# Patient Record
Sex: FEMALE | Race: WHITE | NOT HISPANIC OR LATINO | Employment: OTHER | URBAN - METROPOLITAN AREA
[De-identification: names, ages, dates, MRNs, and addresses within clinical notes are randomized per-mention and may not be internally consistent; named-entity substitution may affect disease eponyms.]

---

## 2017-01-30 ENCOUNTER — COMMUNICATION - HEALTHEAST (OUTPATIENT)
Dept: ADMINISTRATIVE | Facility: CLINIC | Age: 52
End: 2017-01-30

## 2017-01-30 ENCOUNTER — COMMUNICATION - HEALTHEAST (OUTPATIENT)
Dept: RHEUMATOLOGY | Facility: CLINIC | Age: 52
End: 2017-01-30

## 2017-01-30 DIAGNOSIS — M05.79 SEROPOSITIVE RHEUMATOID ARTHRITIS OF MULTIPLE SITES (H): ICD-10-CM

## 2017-02-06 ENCOUNTER — COMMUNICATION - HEALTHEAST (OUTPATIENT)
Dept: LAB | Facility: CLINIC | Age: 52
End: 2017-02-06

## 2017-02-06 DIAGNOSIS — M05.79 SEROPOSITIVE RHEUMATOID ARTHRITIS OF MULTIPLE SITES (H): ICD-10-CM

## 2017-02-07 ENCOUNTER — COMMUNICATION - HEALTHEAST (OUTPATIENT)
Dept: INTERNAL MEDICINE | Facility: CLINIC | Age: 52
End: 2017-02-07

## 2017-02-07 DIAGNOSIS — E03.9 HYPOTHYROIDISM: ICD-10-CM

## 2017-02-21 ENCOUNTER — AMBULATORY - HEALTHEAST (OUTPATIENT)
Dept: LAB | Facility: CLINIC | Age: 52
End: 2017-02-21

## 2017-02-21 DIAGNOSIS — M05.79 SEROPOSITIVE RHEUMATOID ARTHRITIS OF MULTIPLE SITES (H): ICD-10-CM

## 2017-02-21 LAB
ALT SERPL W P-5'-P-CCNC: 16 U/L (ref 0–45)
CREAT SERPL-MCNC: 0.92 MG/DL (ref 0.6–1.1)
GFR SERPL CREATININE-BSD FRML MDRD: >60 ML/MIN/1.73M2

## 2017-03-01 ENCOUNTER — COMMUNICATION - HEALTHEAST (OUTPATIENT)
Dept: INTERNAL MEDICINE | Facility: CLINIC | Age: 52
End: 2017-03-01

## 2017-03-01 DIAGNOSIS — I10 UNSPECIFIED ESSENTIAL HYPERTENSION: ICD-10-CM

## 2017-03-11 ENCOUNTER — COMMUNICATION - HEALTHEAST (OUTPATIENT)
Dept: INTERNAL MEDICINE | Facility: CLINIC | Age: 52
End: 2017-03-11

## 2017-03-11 DIAGNOSIS — I10 UNSPECIFIED ESSENTIAL HYPERTENSION: ICD-10-CM

## 2017-03-21 ENCOUNTER — OFFICE VISIT - HEALTHEAST (OUTPATIENT)
Dept: INTERNAL MEDICINE | Facility: CLINIC | Age: 52
End: 2017-03-21

## 2017-03-21 DIAGNOSIS — E55.9 VITAMIN D DEFICIENCY: ICD-10-CM

## 2017-03-21 DIAGNOSIS — I10 ESSENTIAL HYPERTENSION: ICD-10-CM

## 2017-03-21 DIAGNOSIS — M05.79 SEROPOSITIVE RHEUMATOID ARTHRITIS OF MULTIPLE SITES (H): ICD-10-CM

## 2017-03-21 DIAGNOSIS — Z00.00 HEALTH CARE MAINTENANCE: ICD-10-CM

## 2017-03-21 DIAGNOSIS — E78.5 DYSLIPIDEMIA: ICD-10-CM

## 2017-03-21 DIAGNOSIS — E03.9 HYPOTHYROIDISM: ICD-10-CM

## 2017-03-21 DIAGNOSIS — Z82.62 FAMILY HISTORY OF OSTEOPOROSIS: ICD-10-CM

## 2017-03-21 LAB — LDLC SERPL CALC-MCNC: 161 MG/DL

## 2017-03-21 ASSESSMENT — MIFFLIN-ST. JEOR: SCORE: 1615.09

## 2017-03-22 ENCOUNTER — COMMUNICATION - HEALTHEAST (OUTPATIENT)
Dept: INTERNAL MEDICINE | Facility: CLINIC | Age: 52
End: 2017-03-22

## 2017-03-22 DIAGNOSIS — E78.5 DYSLIPIDEMIA: ICD-10-CM

## 2017-03-22 DIAGNOSIS — E03.9 HYPOTHYROID: ICD-10-CM

## 2017-03-28 ENCOUNTER — RECORDS - HEALTHEAST (OUTPATIENT)
Dept: ADMINISTRATIVE | Facility: OTHER | Age: 52
End: 2017-03-28

## 2017-03-28 ENCOUNTER — RECORDS - HEALTHEAST (OUTPATIENT)
Dept: BONE DENSITY | Facility: CLINIC | Age: 52
End: 2017-03-28

## 2017-03-28 DIAGNOSIS — Z82.62 FAMILY HISTORY OF OSTEOPOROSIS: ICD-10-CM

## 2017-03-28 DIAGNOSIS — M05.79 RHEUMATOID ARTHRITIS WITH RHEUMATOID FACTOR OF MULTIPLE SITES WITHOUT ORGAN OR SYSTEMS INVOLVEMENT (H): ICD-10-CM

## 2017-04-03 ENCOUNTER — COMMUNICATION - HEALTHEAST (OUTPATIENT)
Dept: RHEUMATOLOGY | Facility: CLINIC | Age: 52
End: 2017-04-03

## 2017-04-03 DIAGNOSIS — M05.79 SEROPOSITIVE RHEUMATOID ARTHRITIS OF MULTIPLE SITES (H): ICD-10-CM

## 2017-04-07 ENCOUNTER — RECORDS - HEALTHEAST (OUTPATIENT)
Dept: ADMINISTRATIVE | Facility: OTHER | Age: 52
End: 2017-04-07

## 2017-04-07 ENCOUNTER — COMMUNICATION - HEALTHEAST (OUTPATIENT)
Dept: RHEUMATOLOGY | Facility: CLINIC | Age: 52
End: 2017-04-07

## 2017-04-07 DIAGNOSIS — M05.79 SEROPOSITIVE RHEUMATOID ARTHRITIS OF MULTIPLE SITES (H): ICD-10-CM

## 2017-04-11 ENCOUNTER — COMMUNICATION - HEALTHEAST (OUTPATIENT)
Dept: RHEUMATOLOGY | Facility: CLINIC | Age: 52
End: 2017-04-11

## 2017-04-11 DIAGNOSIS — M05.79 SEROPOSITIVE RHEUMATOID ARTHRITIS OF MULTIPLE SITES (H): ICD-10-CM

## 2017-04-18 ENCOUNTER — AMBULATORY - HEALTHEAST (OUTPATIENT)
Dept: LAB | Facility: CLINIC | Age: 52
End: 2017-04-18

## 2017-04-18 DIAGNOSIS — E78.5 DYSLIPIDEMIA: ICD-10-CM

## 2017-04-18 DIAGNOSIS — M05.79 SEROPOSITIVE RHEUMATOID ARTHRITIS OF MULTIPLE SITES (H): ICD-10-CM

## 2017-04-18 DIAGNOSIS — E03.9 HYPOTHYROID: ICD-10-CM

## 2017-04-18 LAB
ALT SERPL W P-5'-P-CCNC: 20 U/L (ref 0–45)
CHOLEST SERPL-MCNC: 232 MG/DL
CREAT SERPL-MCNC: 0.99 MG/DL (ref 0.6–1.1)
FASTING STATUS PATIENT QL REPORTED: YES
GFR SERPL CREATININE-BSD FRML MDRD: 59 ML/MIN/1.73M2
HDLC SERPL-MCNC: 58 MG/DL
LDLC SERPL CALC-MCNC: 153 MG/DL
TRIGL SERPL-MCNC: 104 MG/DL

## 2017-04-19 ENCOUNTER — COMMUNICATION - HEALTHEAST (OUTPATIENT)
Dept: INTERNAL MEDICINE | Facility: CLINIC | Age: 52
End: 2017-04-19

## 2017-04-20 ENCOUNTER — OFFICE VISIT - HEALTHEAST (OUTPATIENT)
Dept: RHEUMATOLOGY | Facility: CLINIC | Age: 52
End: 2017-04-20

## 2017-04-20 DIAGNOSIS — M05.79 SEROPOSITIVE RHEUMATOID ARTHRITIS OF MULTIPLE SITES (H): ICD-10-CM

## 2017-04-20 DIAGNOSIS — M17.0 PRIMARY OSTEOARTHRITIS OF BOTH KNEES: ICD-10-CM

## 2017-04-20 DIAGNOSIS — R76.8 POSITIVE ANTI-CCP TEST: ICD-10-CM

## 2017-04-20 DIAGNOSIS — Z79.899 HIGH RISK MEDICATION USE: ICD-10-CM

## 2017-04-20 ASSESSMENT — MIFFLIN-ST. JEOR: SCORE: 1613.27

## 2017-05-15 ENCOUNTER — COMMUNICATION - HEALTHEAST (OUTPATIENT)
Dept: INTERNAL MEDICINE | Facility: CLINIC | Age: 52
End: 2017-05-15

## 2017-05-15 DIAGNOSIS — E03.9 HYPOTHYROIDISM: ICD-10-CM

## 2017-05-16 ENCOUNTER — RECORDS - HEALTHEAST (OUTPATIENT)
Dept: ADMINISTRATIVE | Facility: OTHER | Age: 52
End: 2017-05-16

## 2017-05-30 ENCOUNTER — COMMUNICATION - HEALTHEAST (OUTPATIENT)
Dept: RHEUMATOLOGY | Facility: CLINIC | Age: 52
End: 2017-05-30

## 2017-05-30 DIAGNOSIS — Z79.899 HIGH RISK MEDICATION USE: ICD-10-CM

## 2017-05-30 DIAGNOSIS — M05.79 SEROPOSITIVE RHEUMATOID ARTHRITIS OF MULTIPLE SITES (H): ICD-10-CM

## 2017-06-18 ENCOUNTER — COMMUNICATION - HEALTHEAST (OUTPATIENT)
Dept: INTERNAL MEDICINE | Facility: CLINIC | Age: 52
End: 2017-06-18

## 2017-06-18 DIAGNOSIS — I10 UNSPECIFIED ESSENTIAL HYPERTENSION: ICD-10-CM

## 2017-07-17 ENCOUNTER — COMMUNICATION - HEALTHEAST (OUTPATIENT)
Dept: RHEUMATOLOGY | Facility: CLINIC | Age: 52
End: 2017-07-17

## 2017-07-17 DIAGNOSIS — M05.79 SEROPOSITIVE RHEUMATOID ARTHRITIS OF MULTIPLE SITES (H): ICD-10-CM

## 2017-07-21 ENCOUNTER — AMBULATORY - HEALTHEAST (OUTPATIENT)
Dept: LAB | Facility: CLINIC | Age: 52
End: 2017-07-21

## 2017-07-21 DIAGNOSIS — M05.79 SEROPOSITIVE RHEUMATOID ARTHRITIS OF MULTIPLE SITES (H): ICD-10-CM

## 2017-07-21 LAB
ALT SERPL W P-5'-P-CCNC: 20 U/L (ref 0–45)
CREAT SERPL-MCNC: 1.04 MG/DL (ref 0.6–1.1)
GFR SERPL CREATININE-BSD FRML MDRD: 55 ML/MIN/1.73M2

## 2017-09-05 ENCOUNTER — COMMUNICATION - HEALTHEAST (OUTPATIENT)
Dept: RHEUMATOLOGY | Facility: CLINIC | Age: 52
End: 2017-09-05

## 2017-09-05 DIAGNOSIS — M05.79 SEROPOSITIVE RHEUMATOID ARTHRITIS OF MULTIPLE SITES (H): ICD-10-CM

## 2017-09-06 ENCOUNTER — COMMUNICATION - HEALTHEAST (OUTPATIENT)
Dept: RHEUMATOLOGY | Facility: CLINIC | Age: 52
End: 2017-09-06

## 2017-09-06 DIAGNOSIS — M05.79 SEROPOSITIVE RHEUMATOID ARTHRITIS OF MULTIPLE SITES (H): ICD-10-CM

## 2017-09-06 DIAGNOSIS — Z79.899 HIGH RISK MEDICATION USE: ICD-10-CM

## 2017-09-10 ENCOUNTER — COMMUNICATION - HEALTHEAST (OUTPATIENT)
Dept: INTERNAL MEDICINE | Facility: CLINIC | Age: 52
End: 2017-09-10

## 2017-09-10 DIAGNOSIS — I10 UNSPECIFIED ESSENTIAL HYPERTENSION: ICD-10-CM

## 2017-09-10 DIAGNOSIS — E03.9 HYPOTHYROIDISM: ICD-10-CM

## 2017-09-12 ENCOUNTER — OFFICE VISIT - HEALTHEAST (OUTPATIENT)
Dept: INTERNAL MEDICINE | Facility: CLINIC | Age: 52
End: 2017-09-12

## 2017-09-12 DIAGNOSIS — I95.1 ORTHOSTATIC HYPOTENSION: ICD-10-CM

## 2017-09-24 ENCOUNTER — COMMUNICATION - HEALTHEAST (OUTPATIENT)
Dept: RHEUMATOLOGY | Facility: CLINIC | Age: 52
End: 2017-09-24

## 2017-09-26 ENCOUNTER — OFFICE VISIT - HEALTHEAST (OUTPATIENT)
Dept: INTERNAL MEDICINE | Facility: CLINIC | Age: 52
End: 2017-09-26

## 2017-09-26 DIAGNOSIS — R42 DIZZINESS: ICD-10-CM

## 2017-09-26 DIAGNOSIS — E03.9 HYPOTHYROIDISM: ICD-10-CM

## 2017-10-17 ENCOUNTER — AMBULATORY - HEALTHEAST (OUTPATIENT)
Dept: LAB | Facility: CLINIC | Age: 52
End: 2017-10-17

## 2017-10-17 DIAGNOSIS — M05.79 SEROPOSITIVE RHEUMATOID ARTHRITIS OF MULTIPLE SITES (H): ICD-10-CM

## 2017-10-17 LAB
ALT SERPL W P-5'-P-CCNC: 19 U/L (ref 0–45)
CREAT SERPL-MCNC: 1.09 MG/DL (ref 0.6–1.1)
GFR SERPL CREATININE-BSD FRML MDRD: 52 ML/MIN/1.73M2

## 2017-10-19 ENCOUNTER — OFFICE VISIT - HEALTHEAST (OUTPATIENT)
Dept: RHEUMATOLOGY | Facility: CLINIC | Age: 52
End: 2017-10-19

## 2017-10-19 DIAGNOSIS — M17.0 PRIMARY OSTEOARTHRITIS OF BOTH KNEES: ICD-10-CM

## 2017-10-19 DIAGNOSIS — R76.8 POSITIVE ANTI-CCP TEST: ICD-10-CM

## 2017-10-19 DIAGNOSIS — M05.79 SEROPOSITIVE RHEUMATOID ARTHRITIS OF MULTIPLE SITES (H): ICD-10-CM

## 2017-10-19 DIAGNOSIS — Z79.899 HIGH RISK MEDICATION USE: ICD-10-CM

## 2017-10-19 ASSESSMENT — MIFFLIN-ST. JEOR: SCORE: 1640.49

## 2018-01-02 ENCOUNTER — COMMUNICATION - HEALTHEAST (OUTPATIENT)
Dept: RHEUMATOLOGY | Facility: CLINIC | Age: 53
End: 2018-01-02

## 2018-01-02 DIAGNOSIS — M05.79 SEROPOSITIVE RHEUMATOID ARTHRITIS OF MULTIPLE SITES (H): ICD-10-CM

## 2018-01-09 ENCOUNTER — COMMUNICATION - HEALTHEAST (OUTPATIENT)
Dept: ADMINISTRATIVE | Facility: CLINIC | Age: 53
End: 2018-01-09

## 2018-01-09 DIAGNOSIS — M05.79 SEROPOSITIVE RHEUMATOID ARTHRITIS OF MULTIPLE SITES (H): ICD-10-CM

## 2018-01-25 ENCOUNTER — AMBULATORY - HEALTHEAST (OUTPATIENT)
Dept: RHEUMATOLOGY | Facility: CLINIC | Age: 53
End: 2018-01-25

## 2018-01-25 DIAGNOSIS — M05.79 SEROPOSITIVE RHEUMATOID ARTHRITIS OF MULTIPLE SITES (H): ICD-10-CM

## 2018-01-30 ENCOUNTER — AMBULATORY - HEALTHEAST (OUTPATIENT)
Dept: LAB | Facility: CLINIC | Age: 53
End: 2018-01-30

## 2018-01-30 DIAGNOSIS — M05.79 SEROPOSITIVE RHEUMATOID ARTHRITIS OF MULTIPLE SITES (H): ICD-10-CM

## 2018-01-30 LAB
ALBUMIN SERPL-MCNC: 3.8 G/DL (ref 3.5–5)
ALT SERPL W P-5'-P-CCNC: 29 U/L (ref 0–45)
CREAT SERPL-MCNC: 1 MG/DL (ref 0.6–1.1)
ERYTHROCYTE [DISTWIDTH] IN BLOOD BY AUTOMATED COUNT: 12.3 % (ref 11–14.5)
GFR SERPL CREATININE-BSD FRML MDRD: 58 ML/MIN/1.73M2
HCT VFR BLD AUTO: 38.1 % (ref 35–47)
HGB BLD-MCNC: 12.5 G/DL (ref 12–16)
MCH RBC QN AUTO: 30.4 PG (ref 27–34)
MCHC RBC AUTO-ENTMCNC: 32.9 G/DL (ref 32–36)
MCV RBC AUTO: 93 FL (ref 80–100)
PLATELET # BLD AUTO: 290 THOU/UL (ref 140–440)
PMV BLD AUTO: 6.8 FL (ref 7–10)
RBC # BLD AUTO: 4.12 MILL/UL (ref 3.8–5.4)
WBC: 6.3 THOU/UL (ref 4–11)

## 2018-02-05 ENCOUNTER — HOSPITAL ENCOUNTER (OUTPATIENT)
Dept: MAMMOGRAPHY | Facility: CLINIC | Age: 53
Discharge: HOME | End: 2018-02-05
Attending: INTERNAL MEDICINE

## 2018-02-05 DIAGNOSIS — Z12.31 VISIT FOR SCREENING MAMMOGRAM: ICD-10-CM

## 2018-02-06 ENCOUNTER — COMMUNICATION - HEALTHEAST (OUTPATIENT)
Dept: MAMMOGRAPHY | Facility: CLINIC | Age: 53
End: 2018-02-06

## 2018-02-09 ENCOUNTER — HOSPITAL ENCOUNTER (OUTPATIENT)
Dept: ULTRASOUND IMAGING | Facility: CLINIC | Age: 53
Discharge: HOME | End: 2018-02-09
Attending: INTERNAL MEDICINE

## 2018-02-09 ENCOUNTER — HOSPITAL ENCOUNTER (OUTPATIENT)
Dept: MAMMOGRAPHY | Facility: CLINIC | Age: 53
Discharge: HOME | End: 2018-02-09
Attending: INTERNAL MEDICINE

## 2018-02-09 DIAGNOSIS — N64.89 BREAST ASYMMETRY: ICD-10-CM

## 2018-02-10 ENCOUNTER — COMMUNICATION - HEALTHEAST (OUTPATIENT)
Dept: RHEUMATOLOGY | Facility: CLINIC | Age: 53
End: 2018-02-10

## 2018-02-10 DIAGNOSIS — M05.79 SEROPOSITIVE RHEUMATOID ARTHRITIS OF MULTIPLE SITES (H): ICD-10-CM

## 2018-03-26 ENCOUNTER — COMMUNICATION - HEALTHEAST (OUTPATIENT)
Dept: RHEUMATOLOGY | Facility: CLINIC | Age: 53
End: 2018-03-26

## 2018-03-26 DIAGNOSIS — M05.79 SEROPOSITIVE RHEUMATOID ARTHRITIS OF MULTIPLE SITES (H): ICD-10-CM

## 2018-04-12 ENCOUNTER — RECORDS - HEALTHEAST (OUTPATIENT)
Dept: ADMINISTRATIVE | Facility: OTHER | Age: 53
End: 2018-04-12

## 2018-04-17 ENCOUNTER — AMBULATORY - HEALTHEAST (OUTPATIENT)
Dept: LAB | Facility: CLINIC | Age: 53
End: 2018-04-17

## 2018-04-17 DIAGNOSIS — M05.79 SEROPOSITIVE RHEUMATOID ARTHRITIS OF MULTIPLE SITES (H): ICD-10-CM

## 2018-04-17 LAB
ALBUMIN SERPL-MCNC: 4 G/DL (ref 3.5–5)
ALT SERPL W P-5'-P-CCNC: 18 U/L (ref 0–45)
CREAT SERPL-MCNC: 0.99 MG/DL (ref 0.6–1.1)
ERYTHROCYTE [DISTWIDTH] IN BLOOD BY AUTOMATED COUNT: 12.4 % (ref 11–14.5)
GFR SERPL CREATININE-BSD FRML MDRD: 58 ML/MIN/1.73M2
HCT VFR BLD AUTO: 39.9 % (ref 35–47)
HGB BLD-MCNC: 13.4 G/DL (ref 12–16)
MCH RBC QN AUTO: 31.3 PG (ref 27–34)
MCHC RBC AUTO-ENTMCNC: 33.6 G/DL (ref 32–36)
MCV RBC AUTO: 93 FL (ref 80–100)
PLATELET # BLD AUTO: 316 THOU/UL (ref 140–440)
PMV BLD AUTO: 7 FL (ref 7–10)
RBC # BLD AUTO: 4.28 MILL/UL (ref 3.8–5.4)
WBC: 5.6 THOU/UL (ref 4–11)

## 2018-04-19 ENCOUNTER — COMMUNICATION - HEALTHEAST (OUTPATIENT)
Dept: INTERNAL MEDICINE | Facility: CLINIC | Age: 53
End: 2018-04-19

## 2018-04-19 DIAGNOSIS — I10 ESSENTIAL HYPERTENSION: ICD-10-CM

## 2018-05-01 ENCOUNTER — OFFICE VISIT - HEALTHEAST (OUTPATIENT)
Dept: RHEUMATOLOGY | Facility: CLINIC | Age: 53
End: 2018-05-01

## 2018-05-01 DIAGNOSIS — Z79.899 HIGH RISK MEDICATION USE: ICD-10-CM

## 2018-05-01 DIAGNOSIS — M17.0 PRIMARY OSTEOARTHRITIS OF BOTH KNEES: ICD-10-CM

## 2018-05-01 DIAGNOSIS — M05.79 SEROPOSITIVE RHEUMATOID ARTHRITIS OF MULTIPLE SITES (H): ICD-10-CM

## 2018-05-01 DIAGNOSIS — R76.8 POSITIVE ANTI-CCP TEST: ICD-10-CM

## 2018-05-07 ENCOUNTER — COMMUNICATION - HEALTHEAST (OUTPATIENT)
Dept: INTERNAL MEDICINE | Facility: CLINIC | Age: 53
End: 2018-05-07

## 2018-05-07 ENCOUNTER — COMMUNICATION - HEALTHEAST (OUTPATIENT)
Dept: RHEUMATOLOGY | Facility: CLINIC | Age: 53
End: 2018-05-07

## 2018-05-07 DIAGNOSIS — E03.9 HYPOTHYROIDISM: ICD-10-CM

## 2018-05-07 DIAGNOSIS — M05.79 SEROPOSITIVE RHEUMATOID ARTHRITIS OF MULTIPLE SITES (H): ICD-10-CM

## 2018-05-09 ENCOUNTER — OFFICE VISIT - HEALTHEAST (OUTPATIENT)
Dept: INTERNAL MEDICINE | Facility: CLINIC | Age: 53
End: 2018-05-09

## 2018-05-09 DIAGNOSIS — I10 ESSENTIAL HYPERTENSION: ICD-10-CM

## 2018-05-09 DIAGNOSIS — M05.79 SEROPOSITIVE RHEUMATOID ARTHRITIS OF MULTIPLE SITES (H): ICD-10-CM

## 2018-05-09 DIAGNOSIS — E78.5 DYSLIPIDEMIA: ICD-10-CM

## 2018-05-09 DIAGNOSIS — E55.9 VITAMIN D DEFICIENCY: ICD-10-CM

## 2018-05-09 DIAGNOSIS — Z00.00 HEALTH CARE MAINTENANCE: ICD-10-CM

## 2018-05-09 DIAGNOSIS — G47.00 INSOMNIA: ICD-10-CM

## 2018-05-09 DIAGNOSIS — E03.9 HYPOTHYROIDISM: ICD-10-CM

## 2018-05-14 ENCOUNTER — AMBULATORY - HEALTHEAST (OUTPATIENT)
Dept: LAB | Facility: CLINIC | Age: 53
End: 2018-05-14

## 2018-05-14 DIAGNOSIS — Z00.00 HEALTH CARE MAINTENANCE: ICD-10-CM

## 2018-05-14 DIAGNOSIS — E78.5 DYSLIPIDEMIA: ICD-10-CM

## 2018-05-14 DIAGNOSIS — E55.9 VITAMIN D DEFICIENCY: ICD-10-CM

## 2018-05-14 DIAGNOSIS — E03.9 HYPOTHYROIDISM: ICD-10-CM

## 2018-05-14 DIAGNOSIS — I10 ESSENTIAL HYPERTENSION: ICD-10-CM

## 2018-05-14 LAB
ALBUMIN SERPL-MCNC: 3.8 G/DL (ref 3.5–5)
ALBUMIN UR-MCNC: NEGATIVE MG/DL
ALP SERPL-CCNC: 66 U/L (ref 45–120)
ALT SERPL W P-5'-P-CCNC: 20 U/L (ref 0–45)
ANION GAP SERPL CALCULATED.3IONS-SCNC: 11 MMOL/L (ref 5–18)
APPEARANCE UR: CLEAR
AST SERPL W P-5'-P-CCNC: 26 U/L (ref 0–40)
BACTERIA #/AREA URNS HPF: ABNORMAL HPF
BILIRUB SERPL-MCNC: 0.4 MG/DL (ref 0–1)
BILIRUB UR QL STRIP: NEGATIVE
BUN SERPL-MCNC: 13 MG/DL (ref 8–22)
CALCIUM SERPL-MCNC: 9.4 MG/DL (ref 8.5–10.5)
CHLORIDE BLD-SCNC: 106 MMOL/L (ref 98–107)
CHOLEST SERPL-MCNC: 219 MG/DL
CO2 SERPL-SCNC: 25 MMOL/L (ref 22–31)
COLOR UR AUTO: YELLOW
CREAT SERPL-MCNC: 0.96 MG/DL (ref 0.6–1.1)
ERYTHROCYTE [DISTWIDTH] IN BLOOD BY AUTOMATED COUNT: 11.9 % (ref 11–14.5)
FASTING STATUS PATIENT QL REPORTED: YES
GFR SERPL CREATININE-BSD FRML MDRD: >60 ML/MIN/1.73M2
GLUCOSE BLD-MCNC: 94 MG/DL (ref 70–125)
GLUCOSE UR STRIP-MCNC: NEGATIVE MG/DL
HCT VFR BLD AUTO: 41.6 % (ref 35–47)
HDLC SERPL-MCNC: 56 MG/DL
HGB BLD-MCNC: 13.4 G/DL (ref 12–16)
HGB UR QL STRIP: NEGATIVE
KETONES UR STRIP-MCNC: NEGATIVE MG/DL
LDLC SERPL CALC-MCNC: 141 MG/DL
LEUKOCYTE ESTERASE UR QL STRIP: ABNORMAL
MCH RBC QN AUTO: 30.1 PG (ref 27–34)
MCHC RBC AUTO-ENTMCNC: 32.2 G/DL (ref 32–36)
MCV RBC AUTO: 93 FL (ref 80–100)
NITRATE UR QL: NEGATIVE
PH UR STRIP: 5 [PH] (ref 5–8)
PLATELET # BLD AUTO: 307 THOU/UL (ref 140–440)
PMV BLD AUTO: 6.8 FL (ref 7–10)
POTASSIUM BLD-SCNC: 4 MMOL/L (ref 3.5–5)
PROT SERPL-MCNC: 7 G/DL (ref 6–8)
RBC # BLD AUTO: 4.45 MILL/UL (ref 3.8–5.4)
RBC #/AREA URNS AUTO: ABNORMAL HPF
SODIUM SERPL-SCNC: 142 MMOL/L (ref 136–145)
SP GR UR STRIP: 1.02 (ref 1–1.03)
SQUAMOUS #/AREA URNS AUTO: ABNORMAL LPF
TRIGL SERPL-MCNC: 112 MG/DL
TSH SERPL DL<=0.005 MIU/L-ACNC: 3.61 UIU/ML (ref 0.3–5)
UROBILINOGEN UR STRIP-ACNC: ABNORMAL
WBC #/AREA URNS AUTO: ABNORMAL HPF
WBC: 4.3 THOU/UL (ref 4–11)

## 2018-05-15 LAB
25(OH)D3 SERPL-MCNC: 31.7 NG/ML (ref 30–80)
25(OH)D3 SERPL-MCNC: 31.7 NG/ML (ref 30–80)

## 2018-05-22 ENCOUNTER — RECORDS - HEALTHEAST (OUTPATIENT)
Dept: ADMINISTRATIVE | Facility: OTHER | Age: 53
End: 2018-05-22

## 2018-06-18 ENCOUNTER — COMMUNICATION - HEALTHEAST (OUTPATIENT)
Dept: RHEUMATOLOGY | Facility: CLINIC | Age: 53
End: 2018-06-18

## 2018-06-18 DIAGNOSIS — M05.79 SEROPOSITIVE RHEUMATOID ARTHRITIS OF MULTIPLE SITES (H): ICD-10-CM

## 2018-07-02 ENCOUNTER — COMMUNICATION - HEALTHEAST (OUTPATIENT)
Dept: RHEUMATOLOGY | Facility: CLINIC | Age: 53
End: 2018-07-02

## 2018-07-02 DIAGNOSIS — M05.79 SEROPOSITIVE RHEUMATOID ARTHRITIS OF MULTIPLE SITES (H): ICD-10-CM

## 2018-07-20 ENCOUNTER — COMMUNICATION - HEALTHEAST (OUTPATIENT)
Dept: INTERNAL MEDICINE | Facility: CLINIC | Age: 53
End: 2018-07-20

## 2018-07-20 DIAGNOSIS — I10 ESSENTIAL HYPERTENSION: ICD-10-CM

## 2018-08-07 ENCOUNTER — AMBULATORY - HEALTHEAST (OUTPATIENT)
Dept: LAB | Facility: CLINIC | Age: 53
End: 2018-08-07

## 2018-08-07 DIAGNOSIS — M05.79 SEROPOSITIVE RHEUMATOID ARTHRITIS OF MULTIPLE SITES (H): ICD-10-CM

## 2018-08-07 LAB
ALBUMIN SERPL-MCNC: 4 G/DL (ref 3.5–5)
ALT SERPL W P-5'-P-CCNC: 17 U/L (ref 0–45)
CREAT SERPL-MCNC: 0.96 MG/DL (ref 0.6–1.1)
ERYTHROCYTE [DISTWIDTH] IN BLOOD BY AUTOMATED COUNT: 12.6 % (ref 11–14.5)
GFR SERPL CREATININE-BSD FRML MDRD: 60 ML/MIN/1.73M2
HCT VFR BLD AUTO: 42 % (ref 35–47)
HGB BLD-MCNC: 13.7 G/DL (ref 12–16)
MCH RBC QN AUTO: 30.4 PG (ref 27–34)
MCHC RBC AUTO-ENTMCNC: 32.6 G/DL (ref 32–36)
MCV RBC AUTO: 93 FL (ref 80–100)
PLATELET # BLD AUTO: 314 THOU/UL (ref 140–440)
PMV BLD AUTO: 6.9 FL (ref 7–10)
RBC # BLD AUTO: 4.51 MILL/UL (ref 3.8–5.4)
WBC: 4.9 THOU/UL (ref 4–11)

## 2018-08-26 ENCOUNTER — COMMUNICATION - HEALTHEAST (OUTPATIENT)
Dept: RHEUMATOLOGY | Facility: CLINIC | Age: 53
End: 2018-08-26

## 2018-08-26 DIAGNOSIS — M05.79 SEROPOSITIVE RHEUMATOID ARTHRITIS OF MULTIPLE SITES (H): ICD-10-CM

## 2018-09-03 ENCOUNTER — COMMUNICATION - HEALTHEAST (OUTPATIENT)
Dept: RHEUMATOLOGY | Facility: CLINIC | Age: 53
End: 2018-09-03

## 2018-09-03 ENCOUNTER — COMMUNICATION - HEALTHEAST (OUTPATIENT)
Dept: INTERNAL MEDICINE | Facility: CLINIC | Age: 53
End: 2018-09-03

## 2018-09-03 DIAGNOSIS — M05.79 SEROPOSITIVE RHEUMATOID ARTHRITIS OF MULTIPLE SITES (H): ICD-10-CM

## 2018-09-03 DIAGNOSIS — E03.9 HYPOTHYROIDISM: ICD-10-CM

## 2018-09-19 ENCOUNTER — COMMUNICATION - HEALTHEAST (OUTPATIENT)
Dept: RHEUMATOLOGY | Facility: CLINIC | Age: 53
End: 2018-09-19

## 2018-09-19 DIAGNOSIS — Z79.899 HIGH RISK MEDICATION USE: ICD-10-CM

## 2018-09-19 DIAGNOSIS — M05.79 SEROPOSITIVE RHEUMATOID ARTHRITIS OF MULTIPLE SITES (H): ICD-10-CM

## 2018-10-07 ENCOUNTER — COMMUNICATION - HEALTHEAST (OUTPATIENT)
Dept: RHEUMATOLOGY | Facility: CLINIC | Age: 53
End: 2018-10-07

## 2018-10-07 DIAGNOSIS — M05.79 SEROPOSITIVE RHEUMATOID ARTHRITIS OF MULTIPLE SITES (H): ICD-10-CM

## 2018-10-30 ENCOUNTER — AMBULATORY - HEALTHEAST (OUTPATIENT)
Dept: LAB | Facility: CLINIC | Age: 53
End: 2018-10-30

## 2018-10-30 DIAGNOSIS — M05.79 SEROPOSITIVE RHEUMATOID ARTHRITIS OF MULTIPLE SITES (H): ICD-10-CM

## 2018-10-30 LAB
ALBUMIN SERPL-MCNC: 4.2 G/DL (ref 3.5–5)
ALT SERPL W P-5'-P-CCNC: 17 U/L (ref 0–45)
CREAT SERPL-MCNC: 1 MG/DL (ref 0.6–1.1)
ERYTHROCYTE [DISTWIDTH] IN BLOOD BY AUTOMATED COUNT: 12.1 % (ref 11–14.5)
GFR SERPL CREATININE-BSD FRML MDRD: 58 ML/MIN/1.73M2
HCT VFR BLD AUTO: 40.3 % (ref 35–47)
HGB BLD-MCNC: 13.3 G/DL (ref 12–16)
MCH RBC QN AUTO: 30.9 PG (ref 27–34)
MCHC RBC AUTO-ENTMCNC: 33 G/DL (ref 32–36)
MCV RBC AUTO: 94 FL (ref 80–100)
PLATELET # BLD AUTO: 311 THOU/UL (ref 140–440)
PMV BLD AUTO: 6.7 FL (ref 7–10)
RBC # BLD AUTO: 4.31 MILL/UL (ref 3.8–5.4)
WBC: 5 THOU/UL (ref 4–11)

## 2018-11-01 ENCOUNTER — OFFICE VISIT - HEALTHEAST (OUTPATIENT)
Dept: RHEUMATOLOGY | Facility: CLINIC | Age: 53
End: 2018-11-01

## 2018-11-01 DIAGNOSIS — M15.0 PRIMARY OSTEOARTHRITIS INVOLVING MULTIPLE JOINTS: ICD-10-CM

## 2018-11-01 DIAGNOSIS — M05.79 SEROPOSITIVE RHEUMATOID ARTHRITIS OF MULTIPLE SITES (H): ICD-10-CM

## 2018-11-01 DIAGNOSIS — Z79.899 HIGH RISK MEDICATION USE: ICD-10-CM

## 2018-11-01 DIAGNOSIS — R76.8 POSITIVE ANTI-CCP TEST: ICD-10-CM

## 2018-11-13 ENCOUNTER — COMMUNICATION - HEALTHEAST (OUTPATIENT)
Dept: RHEUMATOLOGY | Facility: CLINIC | Age: 53
End: 2018-11-13

## 2018-11-13 DIAGNOSIS — M05.79 SEROPOSITIVE RHEUMATOID ARTHRITIS OF MULTIPLE SITES (H): ICD-10-CM

## 2018-11-25 ENCOUNTER — COMMUNICATION - HEALTHEAST (OUTPATIENT)
Dept: RHEUMATOLOGY | Facility: CLINIC | Age: 53
End: 2018-11-25

## 2018-11-25 DIAGNOSIS — M05.79 SEROPOSITIVE RHEUMATOID ARTHRITIS OF MULTIPLE SITES (H): ICD-10-CM

## 2018-12-03 ENCOUNTER — OFFICE VISIT - HEALTHEAST (OUTPATIENT)
Dept: PODIATRY | Facility: CLINIC | Age: 53
End: 2018-12-03

## 2018-12-03 ENCOUNTER — COMMUNICATION - HEALTHEAST (OUTPATIENT)
Dept: RHEUMATOLOGY | Facility: CLINIC | Age: 53
End: 2018-12-03

## 2018-12-03 DIAGNOSIS — M77.8 CAPSULITIS OF FOOT, UNSPECIFIED LATERALITY: ICD-10-CM

## 2018-12-03 DIAGNOSIS — M79.672 LEFT FOOT PAIN: ICD-10-CM

## 2018-12-03 DIAGNOSIS — M21.612 BUNION, LEFT: ICD-10-CM

## 2018-12-03 DIAGNOSIS — M79.671 RIGHT FOOT PAIN: ICD-10-CM

## 2019-01-07 ENCOUNTER — COMMUNICATION - HEALTHEAST (OUTPATIENT)
Dept: RHEUMATOLOGY | Facility: CLINIC | Age: 54
End: 2019-01-07

## 2019-01-11 ENCOUNTER — COMMUNICATION - HEALTHEAST (OUTPATIENT)
Dept: RHEUMATOLOGY | Facility: CLINIC | Age: 54
End: 2019-01-11

## 2019-01-11 DIAGNOSIS — M05.79 SEROPOSITIVE RHEUMATOID ARTHRITIS OF MULTIPLE SITES (H): ICD-10-CM

## 2019-01-18 ENCOUNTER — OFFICE VISIT - HEALTHEAST (OUTPATIENT)
Dept: INTERNAL MEDICINE | Facility: CLINIC | Age: 54
End: 2019-01-18

## 2019-01-18 DIAGNOSIS — R05.9 COUGH: ICD-10-CM

## 2019-01-25 ENCOUNTER — COMMUNICATION - HEALTHEAST (OUTPATIENT)
Dept: INTERNAL MEDICINE | Facility: CLINIC | Age: 54
End: 2019-01-25

## 2019-01-29 ENCOUNTER — AMBULATORY - HEALTHEAST (OUTPATIENT)
Dept: LAB | Facility: CLINIC | Age: 54
End: 2019-01-29

## 2019-01-29 ENCOUNTER — COMMUNICATION - HEALTHEAST (OUTPATIENT)
Dept: LAB | Facility: CLINIC | Age: 54
End: 2019-01-29

## 2019-01-29 DIAGNOSIS — M05.79 SEROPOSITIVE RHEUMATOID ARTHRITIS OF MULTIPLE SITES (H): ICD-10-CM

## 2019-01-29 LAB
ALBUMIN SERPL-MCNC: 3.8 G/DL (ref 3.5–5)
ALT SERPL W P-5'-P-CCNC: 21 U/L (ref 0–45)
CREAT SERPL-MCNC: 0.94 MG/DL (ref 0.6–1.1)
ERYTHROCYTE [DISTWIDTH] IN BLOOD BY AUTOMATED COUNT: 11.9 % (ref 11–14.5)
GFR SERPL CREATININE-BSD FRML MDRD: >60 ML/MIN/1.73M2
HCT VFR BLD AUTO: 38.6 % (ref 35–47)
HGB BLD-MCNC: 12.5 G/DL (ref 12–16)
MCH RBC QN AUTO: 30.4 PG (ref 27–34)
MCHC RBC AUTO-ENTMCNC: 32.3 G/DL (ref 32–36)
MCV RBC AUTO: 94 FL (ref 80–100)
PLATELET # BLD AUTO: 374 THOU/UL (ref 140–440)
PMV BLD AUTO: 6.8 FL (ref 7–10)
RBC # BLD AUTO: 4.1 MILL/UL (ref 3.8–5.4)
WBC: 5.9 THOU/UL (ref 4–11)

## 2019-02-11 ENCOUNTER — HOSPITAL ENCOUNTER (OUTPATIENT)
Dept: MAMMOGRAPHY | Facility: CLINIC | Age: 54
Discharge: HOME | End: 2019-02-11
Attending: INTERNAL MEDICINE

## 2019-02-11 DIAGNOSIS — Z12.31 VISIT FOR SCREENING MAMMOGRAM: ICD-10-CM

## 2019-02-28 ENCOUNTER — COMMUNICATION - HEALTHEAST (OUTPATIENT)
Dept: RHEUMATOLOGY | Facility: CLINIC | Age: 54
End: 2019-02-28

## 2019-02-28 DIAGNOSIS — M05.79 SEROPOSITIVE RHEUMATOID ARTHRITIS OF MULTIPLE SITES (H): ICD-10-CM

## 2019-04-08 ENCOUNTER — COMMUNICATION - HEALTHEAST (OUTPATIENT)
Dept: INTERNAL MEDICINE | Facility: CLINIC | Age: 54
End: 2019-04-08

## 2019-04-09 ENCOUNTER — COMMUNICATION - HEALTHEAST (OUTPATIENT)
Dept: INTERNAL MEDICINE | Facility: CLINIC | Age: 54
End: 2019-04-09

## 2019-04-09 DIAGNOSIS — E03.9 HYPOTHYROIDISM: ICD-10-CM

## 2019-04-10 ENCOUNTER — RECORDS - HEALTHEAST (OUTPATIENT)
Dept: ADMINISTRATIVE | Facility: OTHER | Age: 54
End: 2019-04-10

## 2019-04-22 ENCOUNTER — COMMUNICATION - HEALTHEAST (OUTPATIENT)
Dept: RHEUMATOLOGY | Facility: CLINIC | Age: 54
End: 2019-04-22

## 2019-04-22 DIAGNOSIS — M05.79 SEROPOSITIVE RHEUMATOID ARTHRITIS OF MULTIPLE SITES (H): ICD-10-CM

## 2019-04-30 ENCOUNTER — AMBULATORY - HEALTHEAST (OUTPATIENT)
Dept: LAB | Facility: CLINIC | Age: 54
End: 2019-04-30

## 2019-04-30 DIAGNOSIS — M05.79 SEROPOSITIVE RHEUMATOID ARTHRITIS OF MULTIPLE SITES (H): ICD-10-CM

## 2019-04-30 LAB
ALBUMIN SERPL-MCNC: 4.1 G/DL (ref 3.5–5)
ALT SERPL W P-5'-P-CCNC: 19 U/L (ref 0–45)
CREAT SERPL-MCNC: 0.93 MG/DL (ref 0.6–1.1)
ERYTHROCYTE [DISTWIDTH] IN BLOOD BY AUTOMATED COUNT: 12.7 % (ref 11–14.5)
GFR SERPL CREATININE-BSD FRML MDRD: >60 ML/MIN/1.73M2
HCT VFR BLD AUTO: 40.7 % (ref 35–47)
HGB BLD-MCNC: 13.2 G/DL (ref 12–16)
MCH RBC QN AUTO: 29.8 PG (ref 27–34)
MCHC RBC AUTO-ENTMCNC: 32.4 G/DL (ref 32–36)
MCV RBC AUTO: 92 FL (ref 80–100)
PLATELET # BLD AUTO: 305 THOU/UL (ref 140–440)
PMV BLD AUTO: 6.9 FL (ref 7–10)
RBC # BLD AUTO: 4.42 MILL/UL (ref 3.8–5.4)
WBC: 5.3 THOU/UL (ref 4–11)

## 2019-05-02 ENCOUNTER — COMMUNICATION - HEALTHEAST (OUTPATIENT)
Dept: ADMINISTRATIVE | Facility: CLINIC | Age: 54
End: 2019-05-02

## 2019-05-02 ENCOUNTER — OFFICE VISIT - HEALTHEAST (OUTPATIENT)
Dept: RHEUMATOLOGY | Facility: CLINIC | Age: 54
End: 2019-05-02

## 2019-05-02 DIAGNOSIS — M05.79 SEROPOSITIVE RHEUMATOID ARTHRITIS OF MULTIPLE SITES (H): ICD-10-CM

## 2019-05-02 DIAGNOSIS — R76.8 POSITIVE ANTI-CCP TEST: ICD-10-CM

## 2019-05-02 DIAGNOSIS — Z79.899 HIGH RISK MEDICATION USE: ICD-10-CM

## 2019-05-02 DIAGNOSIS — M15.0 PRIMARY OSTEOARTHRITIS INVOLVING MULTIPLE JOINTS: ICD-10-CM

## 2019-05-23 ENCOUNTER — RECORDS - HEALTHEAST (OUTPATIENT)
Dept: ADMINISTRATIVE | Facility: OTHER | Age: 54
End: 2019-05-23

## 2019-07-28 ENCOUNTER — COMMUNICATION - HEALTHEAST (OUTPATIENT)
Dept: INTERNAL MEDICINE | Facility: CLINIC | Age: 54
End: 2019-07-28

## 2019-07-28 DIAGNOSIS — I10 ESSENTIAL HYPERTENSION: ICD-10-CM

## 2019-08-02 ENCOUNTER — OFFICE VISIT - HEALTHEAST (OUTPATIENT)
Dept: INTERNAL MEDICINE | Facility: CLINIC | Age: 54
End: 2019-08-02

## 2019-08-02 ENCOUNTER — AMBULATORY - HEALTHEAST (OUTPATIENT)
Dept: LAB | Facility: CLINIC | Age: 54
End: 2019-08-02

## 2019-08-02 DIAGNOSIS — E55.9 VITAMIN D DEFICIENCY: ICD-10-CM

## 2019-08-02 DIAGNOSIS — M05.79 SEROPOSITIVE RHEUMATOID ARTHRITIS OF MULTIPLE SITES (H): ICD-10-CM

## 2019-08-02 DIAGNOSIS — Z00.00 HEALTHCARE MAINTENANCE: ICD-10-CM

## 2019-08-02 DIAGNOSIS — E03.9 HYPOTHYROIDISM, UNSPECIFIED TYPE: ICD-10-CM

## 2019-08-02 DIAGNOSIS — I10 ESSENTIAL HYPERTENSION: ICD-10-CM

## 2019-08-02 DIAGNOSIS — E78.5 DYSLIPIDEMIA: ICD-10-CM

## 2019-08-02 DIAGNOSIS — G47.00 INSOMNIA, UNSPECIFIED TYPE: ICD-10-CM

## 2019-08-02 LAB
ALBUMIN SERPL-MCNC: 4.1 G/DL (ref 3.5–5)
ALBUMIN UR-MCNC: NEGATIVE MG/DL
ALT SERPL W P-5'-P-CCNC: 27 U/L (ref 0–45)
APPEARANCE UR: CLEAR
BACTERIA #/AREA URNS HPF: NORMAL HPF
BILIRUB UR QL STRIP: NEGATIVE
CHOLEST SERPL-MCNC: 213 MG/DL
COLOR UR AUTO: YELLOW
CREAT SERPL-MCNC: 0.99 MG/DL (ref 0.6–1.1)
ERYTHROCYTE [DISTWIDTH] IN BLOOD BY AUTOMATED COUNT: 12.5 % (ref 11–14.5)
FASTING STATUS PATIENT QL REPORTED: YES
GFR SERPL CREATININE-BSD FRML MDRD: 58 ML/MIN/1.73M2
GLUCOSE UR STRIP-MCNC: NEGATIVE MG/DL
HCT VFR BLD AUTO: 39.1 % (ref 35–47)
HDLC SERPL-MCNC: 63 MG/DL
HGB BLD-MCNC: 12.9 G/DL (ref 12–16)
HGB UR QL STRIP: NEGATIVE
KETONES UR STRIP-MCNC: NEGATIVE MG/DL
LDLC SERPL CALC-MCNC: 133 MG/DL
LEUKOCYTE ESTERASE UR QL STRIP: NEGATIVE
MCH RBC QN AUTO: 30.6 PG (ref 27–34)
MCHC RBC AUTO-ENTMCNC: 33 G/DL (ref 32–36)
MCV RBC AUTO: 93 FL (ref 80–100)
NITRATE UR QL: NEGATIVE
PH UR STRIP: 5 [PH] (ref 5–8)
PLATELET # BLD AUTO: 288 THOU/UL (ref 140–440)
PMV BLD AUTO: 6.7 FL (ref 7–10)
RBC # BLD AUTO: 4.22 MILL/UL (ref 3.8–5.4)
RBC #/AREA URNS AUTO: NORMAL HPF
SP GR UR STRIP: >=1.03 (ref 1–1.03)
SQUAMOUS #/AREA URNS AUTO: NORMAL LPF
TRIGL SERPL-MCNC: 86 MG/DL
TSH SERPL DL<=0.005 MIU/L-ACNC: 3.49 UIU/ML (ref 0.3–5)
UROBILINOGEN UR STRIP-ACNC: NORMAL
WBC #/AREA URNS AUTO: NORMAL HPF
WBC: 5.4 THOU/UL (ref 4–11)

## 2019-08-02 ASSESSMENT — MIFFLIN-ST. JEOR: SCORE: 1633.23

## 2019-08-05 LAB
25(OH)D3 SERPL-MCNC: 34.4 NG/ML (ref 30–80)
25(OH)D3 SERPL-MCNC: 34.4 NG/ML (ref 30–80)

## 2019-08-23 ENCOUNTER — COMMUNICATION - HEALTHEAST (OUTPATIENT)
Dept: RHEUMATOLOGY | Facility: CLINIC | Age: 54
End: 2019-08-23

## 2019-08-23 DIAGNOSIS — M05.79 SEROPOSITIVE RHEUMATOID ARTHRITIS OF MULTIPLE SITES (H): ICD-10-CM

## 2019-09-18 ENCOUNTER — COMMUNICATION - HEALTHEAST (OUTPATIENT)
Dept: INTERNAL MEDICINE | Facility: CLINIC | Age: 54
End: 2019-09-18

## 2019-09-22 ENCOUNTER — COMMUNICATION - HEALTHEAST (OUTPATIENT)
Dept: RHEUMATOLOGY | Facility: CLINIC | Age: 54
End: 2019-09-22

## 2019-09-22 DIAGNOSIS — M05.79 SEROPOSITIVE RHEUMATOID ARTHRITIS OF MULTIPLE SITES (H): ICD-10-CM

## 2019-11-01 ENCOUNTER — COMMUNICATION - HEALTHEAST (OUTPATIENT)
Dept: INTERNAL MEDICINE | Facility: CLINIC | Age: 54
End: 2019-11-01

## 2019-11-01 DIAGNOSIS — E03.9 HYPOTHYROIDISM: ICD-10-CM

## 2019-11-05 ENCOUNTER — AMBULATORY - HEALTHEAST (OUTPATIENT)
Dept: LAB | Facility: CLINIC | Age: 54
End: 2019-11-05

## 2019-11-05 DIAGNOSIS — M05.79 SEROPOSITIVE RHEUMATOID ARTHRITIS OF MULTIPLE SITES (H): ICD-10-CM

## 2019-11-05 LAB
ALBUMIN SERPL-MCNC: 4.4 G/DL (ref 3.5–5)
ALT SERPL W P-5'-P-CCNC: 17 U/L (ref 0–45)
CREAT SERPL-MCNC: 1.01 MG/DL (ref 0.6–1.1)
ERYTHROCYTE [DISTWIDTH] IN BLOOD BY AUTOMATED COUNT: 12.7 % (ref 11–14.5)
GFR SERPL CREATININE-BSD FRML MDRD: 57 ML/MIN/1.73M2
HCT VFR BLD AUTO: 40.5 % (ref 35–47)
HGB BLD-MCNC: 13.6 G/DL (ref 12–16)
MCH RBC QN AUTO: 31.9 PG (ref 27–34)
MCHC RBC AUTO-ENTMCNC: 33.6 G/DL (ref 32–36)
MCV RBC AUTO: 95 FL (ref 80–100)
PLATELET # BLD AUTO: 313 THOU/UL (ref 140–440)
PMV BLD AUTO: 6.9 FL (ref 7–10)
RBC # BLD AUTO: 4.26 MILL/UL (ref 3.8–5.4)
WBC: 5.1 THOU/UL (ref 4–11)

## 2019-11-07 ENCOUNTER — OFFICE VISIT - HEALTHEAST (OUTPATIENT)
Dept: RHEUMATOLOGY | Facility: CLINIC | Age: 54
End: 2019-11-07

## 2019-11-07 DIAGNOSIS — R76.8 POSITIVE ANTI-CCP TEST: ICD-10-CM

## 2019-11-07 DIAGNOSIS — M05.79 SEROPOSITIVE RHEUMATOID ARTHRITIS OF MULTIPLE SITES (H): ICD-10-CM

## 2019-11-07 DIAGNOSIS — M15.0 PRIMARY OSTEOARTHRITIS INVOLVING MULTIPLE JOINTS: ICD-10-CM

## 2019-11-07 DIAGNOSIS — Z79.899 HIGH RISK MEDICATION USE: ICD-10-CM

## 2019-11-07 ASSESSMENT — MIFFLIN-ST. JEOR: SCORE: 1622.34

## 2019-11-13 ENCOUNTER — COMMUNICATION - HEALTHEAST (OUTPATIENT)
Dept: FAMILY MEDICINE | Facility: CLINIC | Age: 54
End: 2019-11-13

## 2019-11-13 DIAGNOSIS — Z23 NEED FOR INFLUENZA VACCINATION: ICD-10-CM

## 2019-11-13 DIAGNOSIS — Z23 NEED FOR SHINGLES VACCINE: ICD-10-CM

## 2019-11-14 ENCOUNTER — COMMUNICATION - HEALTHEAST (OUTPATIENT)
Dept: INTERNAL MEDICINE | Facility: CLINIC | Age: 54
End: 2019-11-14

## 2019-11-15 ENCOUNTER — AMBULATORY - HEALTHEAST (OUTPATIENT)
Dept: NURSING | Facility: CLINIC | Age: 54
End: 2019-11-15

## 2019-11-15 DIAGNOSIS — Z23 NEED FOR INFLUENZA VACCINATION: ICD-10-CM

## 2019-11-17 ENCOUNTER — COMMUNICATION - HEALTHEAST (OUTPATIENT)
Dept: RHEUMATOLOGY | Facility: CLINIC | Age: 54
End: 2019-11-17

## 2019-11-22 ENCOUNTER — RECORDS - HEALTHEAST (OUTPATIENT)
Dept: ADMINISTRATIVE | Facility: OTHER | Age: 54
End: 2019-11-22

## 2019-12-09 ENCOUNTER — AMBULATORY - HEALTHEAST (OUTPATIENT)
Dept: NURSING | Facility: CLINIC | Age: 54
End: 2019-12-09

## 2019-12-09 DIAGNOSIS — Z23 NEED FOR SHINGLES VACCINE: ICD-10-CM

## 2020-01-31 ENCOUNTER — COMMUNICATION - HEALTHEAST (OUTPATIENT)
Dept: LAB | Facility: CLINIC | Age: 55
End: 2020-01-31

## 2020-01-31 DIAGNOSIS — M05.79 SEROPOSITIVE RHEUMATOID ARTHRITIS OF MULTIPLE SITES (H): ICD-10-CM

## 2020-02-05 ENCOUNTER — AMBULATORY - HEALTHEAST (OUTPATIENT)
Dept: LAB | Facility: CLINIC | Age: 55
End: 2020-02-05

## 2020-02-05 DIAGNOSIS — M05.79 SEROPOSITIVE RHEUMATOID ARTHRITIS OF MULTIPLE SITES (H): ICD-10-CM

## 2020-02-05 LAB
ALBUMIN SERPL-MCNC: 4.2 G/DL (ref 3.5–5)
ALT SERPL W P-5'-P-CCNC: 27 U/L (ref 0–45)
CREAT SERPL-MCNC: 1.02 MG/DL (ref 0.6–1.1)
ERYTHROCYTE [DISTWIDTH] IN BLOOD BY AUTOMATED COUNT: 12.4 % (ref 11–14.5)
GFR SERPL CREATININE-BSD FRML MDRD: 56 ML/MIN/1.73M2
HCT VFR BLD AUTO: 41.9 % (ref 35–47)
HGB BLD-MCNC: 13.4 G/DL (ref 12–16)
MCH RBC QN AUTO: 30.5 PG (ref 27–34)
MCHC RBC AUTO-ENTMCNC: 31.9 G/DL (ref 32–36)
MCV RBC AUTO: 96 FL (ref 80–100)
PLATELET # BLD AUTO: 319 THOU/UL (ref 140–440)
PMV BLD AUTO: 6.9 FL (ref 7–10)
RBC # BLD AUTO: 4.38 MILL/UL (ref 3.8–5.4)
WBC: 4.9 THOU/UL (ref 4–11)

## 2020-02-17 ENCOUNTER — HOSPITAL ENCOUNTER (OUTPATIENT)
Dept: MAMMOGRAPHY | Facility: CLINIC | Age: 55
Discharge: HOME | End: 2020-02-17
Attending: INTERNAL MEDICINE

## 2020-02-17 DIAGNOSIS — Z12.31 VISIT FOR SCREENING MAMMOGRAM: ICD-10-CM

## 2020-02-20 ENCOUNTER — COMMUNICATION - HEALTHEAST (OUTPATIENT)
Dept: ADMINISTRATIVE | Facility: CLINIC | Age: 55
End: 2020-02-20

## 2020-02-20 DIAGNOSIS — M05.79 SEROPOSITIVE RHEUMATOID ARTHRITIS OF MULTIPLE SITES (H): ICD-10-CM

## 2020-03-13 ENCOUNTER — COMMUNICATION - HEALTHEAST (OUTPATIENT)
Dept: RHEUMATOLOGY | Facility: CLINIC | Age: 55
End: 2020-03-13

## 2020-03-13 ENCOUNTER — COMMUNICATION - HEALTHEAST (OUTPATIENT)
Dept: INTERNAL MEDICINE | Facility: CLINIC | Age: 55
End: 2020-03-13

## 2020-03-16 ENCOUNTER — COMMUNICATION - HEALTHEAST (OUTPATIENT)
Dept: RHEUMATOLOGY | Facility: CLINIC | Age: 55
End: 2020-03-16

## 2020-03-16 DIAGNOSIS — M05.79 SEROPOSITIVE RHEUMATOID ARTHRITIS OF MULTIPLE SITES (H): ICD-10-CM

## 2020-03-18 ENCOUNTER — COMMUNICATION - HEALTHEAST (OUTPATIENT)
Dept: RHEUMATOLOGY | Facility: CLINIC | Age: 55
End: 2020-03-18

## 2020-03-20 ENCOUNTER — AMBULATORY - HEALTHEAST (OUTPATIENT)
Dept: NURSING | Facility: CLINIC | Age: 55
End: 2020-03-20

## 2020-03-20 DIAGNOSIS — Z23 NEED FOR SHINGLES VACCINE: ICD-10-CM

## 2020-03-24 ENCOUNTER — COMMUNICATION - HEALTHEAST (OUTPATIENT)
Dept: INTERNAL MEDICINE | Facility: CLINIC | Age: 55
End: 2020-03-24

## 2020-03-24 DIAGNOSIS — E03.9 HYPOTHYROIDISM: ICD-10-CM

## 2020-03-25 ENCOUNTER — COMMUNICATION - HEALTHEAST (OUTPATIENT)
Dept: INTERNAL MEDICINE | Facility: CLINIC | Age: 55
End: 2020-03-25

## 2020-03-25 DIAGNOSIS — E03.9 HYPOTHYROIDISM: ICD-10-CM

## 2020-04-08 ENCOUNTER — COMMUNICATION - HEALTHEAST (OUTPATIENT)
Dept: INTERNAL MEDICINE | Facility: CLINIC | Age: 55
End: 2020-04-08

## 2020-04-17 ENCOUNTER — RECORDS - HEALTHEAST (OUTPATIENT)
Dept: ADMINISTRATIVE | Facility: OTHER | Age: 55
End: 2020-04-17

## 2020-04-27 ENCOUNTER — COMMUNICATION - HEALTHEAST (OUTPATIENT)
Dept: RHEUMATOLOGY | Facility: CLINIC | Age: 55
End: 2020-04-27

## 2020-04-27 DIAGNOSIS — M05.79 SEROPOSITIVE RHEUMATOID ARTHRITIS OF MULTIPLE SITES (H): ICD-10-CM

## 2020-05-05 ENCOUNTER — AMBULATORY - HEALTHEAST (OUTPATIENT)
Dept: LAB | Facility: CLINIC | Age: 55
End: 2020-05-05

## 2020-05-05 DIAGNOSIS — M05.79 SEROPOSITIVE RHEUMATOID ARTHRITIS OF MULTIPLE SITES (H): ICD-10-CM

## 2020-05-05 LAB
ALBUMIN SERPL-MCNC: 4.2 G/DL (ref 3.5–5)
ALT SERPL W P-5'-P-CCNC: 21 U/L (ref 0–45)
CREAT SERPL-MCNC: 1.02 MG/DL (ref 0.6–1.1)
ERYTHROCYTE [DISTWIDTH] IN BLOOD BY AUTOMATED COUNT: 12 % (ref 11–14.5)
GFR SERPL CREATININE-BSD FRML MDRD: 56 ML/MIN/1.73M2
HCT VFR BLD AUTO: 41.2 % (ref 35–47)
HGB BLD-MCNC: 13.4 G/DL (ref 12–16)
MCH RBC QN AUTO: 30.6 PG (ref 27–34)
MCHC RBC AUTO-ENTMCNC: 32.5 G/DL (ref 32–36)
MCV RBC AUTO: 94 FL (ref 80–100)
PLATELET # BLD AUTO: 291 THOU/UL (ref 140–440)
PMV BLD AUTO: 7 FL (ref 7–10)
RBC # BLD AUTO: 4.39 MILL/UL (ref 3.8–5.4)
WBC: 4.8 THOU/UL (ref 4–11)

## 2020-05-07 ENCOUNTER — OFFICE VISIT - HEALTHEAST (OUTPATIENT)
Dept: RHEUMATOLOGY | Facility: CLINIC | Age: 55
End: 2020-05-07

## 2020-05-07 DIAGNOSIS — M05.79 SEROPOSITIVE RHEUMATOID ARTHRITIS OF MULTIPLE SITES (H): ICD-10-CM

## 2020-05-07 DIAGNOSIS — R76.8 POSITIVE ANTI-CCP TEST: ICD-10-CM

## 2020-05-07 DIAGNOSIS — M15.0 PRIMARY OSTEOARTHRITIS INVOLVING MULTIPLE JOINTS: ICD-10-CM

## 2020-05-07 DIAGNOSIS — Z79.899 HIGH RISK MEDICATION USE: ICD-10-CM

## 2020-05-08 ENCOUNTER — COMMUNICATION - HEALTHEAST (OUTPATIENT)
Dept: RHEUMATOLOGY | Facility: CLINIC | Age: 55
End: 2020-05-08

## 2020-05-28 ENCOUNTER — RECORDS - HEALTHEAST (OUTPATIENT)
Dept: ADMINISTRATIVE | Facility: OTHER | Age: 55
End: 2020-05-28

## 2020-06-14 ENCOUNTER — COMMUNICATION - HEALTHEAST (OUTPATIENT)
Dept: RHEUMATOLOGY | Facility: CLINIC | Age: 55
End: 2020-06-14

## 2020-06-14 DIAGNOSIS — M05.79 SEROPOSITIVE RHEUMATOID ARTHRITIS OF MULTIPLE SITES (H): ICD-10-CM

## 2020-06-18 ENCOUNTER — COMMUNICATION - HEALTHEAST (OUTPATIENT)
Dept: INTERNAL MEDICINE | Facility: CLINIC | Age: 55
End: 2020-06-18

## 2020-06-18 DIAGNOSIS — I10 ESSENTIAL HYPERTENSION: ICD-10-CM

## 2020-06-19 ENCOUNTER — OFFICE VISIT - HEALTHEAST (OUTPATIENT)
Dept: INTERNAL MEDICINE | Facility: CLINIC | Age: 55
End: 2020-06-19

## 2020-06-19 DIAGNOSIS — S39.012A STRAIN OF LUMBAR PARASPINAL MUSCLE, INITIAL ENCOUNTER: ICD-10-CM

## 2020-06-19 DIAGNOSIS — E78.5 DYSLIPIDEMIA: ICD-10-CM

## 2020-06-19 DIAGNOSIS — M05.79 SEROPOSITIVE RHEUMATOID ARTHRITIS OF MULTIPLE SITES (H): ICD-10-CM

## 2020-06-19 DIAGNOSIS — I10 ESSENTIAL HYPERTENSION: ICD-10-CM

## 2020-06-19 DIAGNOSIS — E03.9 HYPOTHYROIDISM, UNSPECIFIED TYPE: ICD-10-CM

## 2020-06-19 DIAGNOSIS — M15.0 PRIMARY OSTEOARTHRITIS INVOLVING MULTIPLE JOINTS: ICD-10-CM

## 2020-06-25 ENCOUNTER — RECORDS - HEALTHEAST (OUTPATIENT)
Dept: ADMINISTRATIVE | Facility: OTHER | Age: 55
End: 2020-06-25

## 2020-06-28 ENCOUNTER — COMMUNICATION - HEALTHEAST (OUTPATIENT)
Dept: INTERNAL MEDICINE | Facility: CLINIC | Age: 55
End: 2020-06-28

## 2020-06-28 ENCOUNTER — COMMUNICATION - HEALTHEAST (OUTPATIENT)
Dept: RHEUMATOLOGY | Facility: CLINIC | Age: 55
End: 2020-06-28

## 2020-07-24 ENCOUNTER — COMMUNICATION - HEALTHEAST (OUTPATIENT)
Dept: RHEUMATOLOGY | Facility: CLINIC | Age: 55
End: 2020-07-24

## 2020-07-24 DIAGNOSIS — M05.79 SEROPOSITIVE RHEUMATOID ARTHRITIS OF MULTIPLE SITES (H): ICD-10-CM

## 2020-08-03 ENCOUNTER — COMMUNICATION - HEALTHEAST (OUTPATIENT)
Dept: INTERNAL MEDICINE | Facility: CLINIC | Age: 55
End: 2020-08-03

## 2020-08-04 ENCOUNTER — AMBULATORY - HEALTHEAST (OUTPATIENT)
Dept: LAB | Facility: CLINIC | Age: 55
End: 2020-08-04

## 2020-08-04 DIAGNOSIS — M05.79 SEROPOSITIVE RHEUMATOID ARTHRITIS OF MULTIPLE SITES (H): ICD-10-CM

## 2020-08-04 LAB
ALBUMIN SERPL-MCNC: 4.4 G/DL (ref 3.5–5)
ALT SERPL W P-5'-P-CCNC: 25 U/L (ref 0–45)
CREAT SERPL-MCNC: 1.11 MG/DL (ref 0.6–1.1)
ERYTHROCYTE [DISTWIDTH] IN BLOOD BY AUTOMATED COUNT: 12.2 % (ref 11–14.5)
GFR SERPL CREATININE-BSD FRML MDRD: 51 ML/MIN/1.73M2
HCT VFR BLD AUTO: 41 % (ref 35–47)
HGB BLD-MCNC: 13.6 G/DL (ref 12–16)
MCH RBC QN AUTO: 31 PG (ref 27–34)
MCHC RBC AUTO-ENTMCNC: 33.1 G/DL (ref 32–36)
MCV RBC AUTO: 94 FL (ref 80–100)
PLATELET # BLD AUTO: 343 THOU/UL (ref 140–440)
PMV BLD AUTO: 7.4 FL (ref 7–10)
RBC # BLD AUTO: 4.38 MILL/UL (ref 3.8–5.4)
WBC: 4.3 THOU/UL (ref 4–11)

## 2020-08-06 ENCOUNTER — OFFICE VISIT - HEALTHEAST (OUTPATIENT)
Dept: RHEUMATOLOGY | Facility: CLINIC | Age: 55
End: 2020-08-06

## 2020-08-06 DIAGNOSIS — Z79.899 HIGH RISK MEDICATION USE: ICD-10-CM

## 2020-08-06 DIAGNOSIS — M05.79 SEROPOSITIVE RHEUMATOID ARTHRITIS OF MULTIPLE SITES (H): ICD-10-CM

## 2020-08-06 DIAGNOSIS — M15.0 PRIMARY OSTEOARTHRITIS INVOLVING MULTIPLE JOINTS: ICD-10-CM

## 2020-08-06 DIAGNOSIS — R76.8 POSITIVE ANTI-CCP TEST: ICD-10-CM

## 2020-08-11 ENCOUNTER — COMMUNICATION - HEALTHEAST (OUTPATIENT)
Dept: ADMINISTRATIVE | Facility: CLINIC | Age: 55
End: 2020-08-11

## 2020-08-11 DIAGNOSIS — M05.79 SEROPOSITIVE RHEUMATOID ARTHRITIS OF MULTIPLE SITES (H): ICD-10-CM

## 2020-08-24 ENCOUNTER — COMMUNICATION - HEALTHEAST (OUTPATIENT)
Dept: INTERNAL MEDICINE | Facility: CLINIC | Age: 55
End: 2020-08-24

## 2020-08-24 DIAGNOSIS — I10 ESSENTIAL HYPERTENSION: ICD-10-CM

## 2020-09-09 ENCOUNTER — OFFICE VISIT - HEALTHEAST (OUTPATIENT)
Dept: INTERNAL MEDICINE | Facility: CLINIC | Age: 55
End: 2020-09-09

## 2020-09-09 DIAGNOSIS — H53.9 VISUAL DISTURBANCE: ICD-10-CM

## 2020-09-09 DIAGNOSIS — Z01.818 PREOPERATIVE EXAMINATION: ICD-10-CM

## 2020-09-09 DIAGNOSIS — M05.79 SEROPOSITIVE RHEUMATOID ARTHRITIS OF MULTIPLE SITES (H): ICD-10-CM

## 2020-09-09 ASSESSMENT — MIFFLIN-ST. JEOR: SCORE: 1557.03

## 2020-09-20 ENCOUNTER — COMMUNICATION - HEALTHEAST (OUTPATIENT)
Dept: RHEUMATOLOGY | Facility: CLINIC | Age: 55
End: 2020-09-20

## 2020-09-20 DIAGNOSIS — M05.79 SEROPOSITIVE RHEUMATOID ARTHRITIS OF MULTIPLE SITES (H): ICD-10-CM

## 2020-10-01 ENCOUNTER — RECORDS - HEALTHEAST (OUTPATIENT)
Dept: ADMINISTRATIVE | Facility: OTHER | Age: 55
End: 2020-10-01

## 2020-10-01 ENCOUNTER — OFFICE VISIT - HEALTHEAST (OUTPATIENT)
Dept: INTERNAL MEDICINE | Facility: CLINIC | Age: 55
End: 2020-10-01

## 2020-10-01 DIAGNOSIS — E03.9 HYPOTHYROIDISM, UNSPECIFIED TYPE: ICD-10-CM

## 2020-10-01 DIAGNOSIS — E78.5 DYSLIPIDEMIA: ICD-10-CM

## 2020-10-01 DIAGNOSIS — M05.79 SEROPOSITIVE RHEUMATOID ARTHRITIS OF MULTIPLE SITES (H): ICD-10-CM

## 2020-10-01 DIAGNOSIS — I10 ESSENTIAL HYPERTENSION: ICD-10-CM

## 2020-10-01 DIAGNOSIS — Z00.00 HEALTHCARE MAINTENANCE: ICD-10-CM

## 2020-10-01 DIAGNOSIS — Z78.0 MENOPAUSE: ICD-10-CM

## 2020-10-01 DIAGNOSIS — E55.9 VITAMIN D DEFICIENCY: ICD-10-CM

## 2020-10-01 ASSESSMENT — MIFFLIN-ST. JEOR: SCORE: 1534.46

## 2020-10-05 LAB
BKR LAB AP ABNORMAL BLEEDING: NO
BKR LAB AP BIRTH CONTROL/HORMONES: NORMAL
BKR LAB AP CERVICAL APPEARANCE: NORMAL
BKR LAB AP GYN ADEQUACY: NORMAL
BKR LAB AP GYN INTERPRETATION: NORMAL
BKR LAB AP HPV REFLEX: NORMAL
BKR LAB AP LMP: NORMAL
BKR LAB AP PATIENT STATUS: NORMAL
BKR LAB AP PREVIOUS ABNORMAL: NO
BKR LAB AP PREVIOUS NORMAL: NORMAL
HIGH RISK?: NO
PATH REPORT.COMMENTS IMP SPEC: NORMAL
RESULT FLAG (HE HISTORICAL CONVERSION): NORMAL

## 2020-10-14 ENCOUNTER — COMMUNICATION - HEALTHEAST (OUTPATIENT)
Dept: INTERNAL MEDICINE | Facility: CLINIC | Age: 55
End: 2020-10-14

## 2020-10-14 ENCOUNTER — COMMUNICATION - HEALTHEAST (OUTPATIENT)
Dept: RHEUMATOLOGY | Facility: CLINIC | Age: 55
End: 2020-10-14

## 2020-10-14 DIAGNOSIS — I10 ESSENTIAL HYPERTENSION: ICD-10-CM

## 2020-10-14 DIAGNOSIS — Z79.899 HIGH RISK MEDICATION USE: ICD-10-CM

## 2020-10-14 DIAGNOSIS — M05.79 SEROPOSITIVE RHEUMATOID ARTHRITIS OF MULTIPLE SITES (H): ICD-10-CM

## 2020-10-14 RX ORDER — FOLIC ACID 1 MG/1
TABLET ORAL
Qty: 90 TABLET | Refills: 3 | Status: SHIPPED | OUTPATIENT
Start: 2020-10-14 | End: 2021-10-25

## 2020-10-18 RX ORDER — LISINOPRIL 20 MG/1
TABLET ORAL
Qty: 90 TABLET | Refills: 3 | Status: SHIPPED | OUTPATIENT
Start: 2020-10-18 | End: 2021-10-26

## 2020-10-18 RX ORDER — HYDROCHLOROTHIAZIDE 25 MG/1
TABLET ORAL
Qty: 90 TABLET | Refills: 3 | Status: SHIPPED | OUTPATIENT
Start: 2020-10-18 | End: 2021-10-26

## 2020-10-20 ENCOUNTER — COMMUNICATION - HEALTHEAST (OUTPATIENT)
Dept: RHEUMATOLOGY | Facility: CLINIC | Age: 55
End: 2020-10-20

## 2020-10-20 DIAGNOSIS — M05.79 SEROPOSITIVE RHEUMATOID ARTHRITIS OF MULTIPLE SITES (H): ICD-10-CM

## 2020-10-29 ENCOUNTER — COMMUNICATION - HEALTHEAST (OUTPATIENT)
Dept: RHEUMATOLOGY | Facility: CLINIC | Age: 55
End: 2020-10-29

## 2020-10-29 DIAGNOSIS — M05.79 SEROPOSITIVE RHEUMATOID ARTHRITIS OF MULTIPLE SITES (H): ICD-10-CM

## 2020-11-03 ENCOUNTER — AMBULATORY - HEALTHEAST (OUTPATIENT)
Dept: LAB | Facility: CLINIC | Age: 55
End: 2020-11-03

## 2020-11-03 DIAGNOSIS — E55.9 VITAMIN D DEFICIENCY: ICD-10-CM

## 2020-11-03 DIAGNOSIS — E03.9 HYPOTHYROIDISM, UNSPECIFIED TYPE: ICD-10-CM

## 2020-11-03 DIAGNOSIS — I10 ESSENTIAL HYPERTENSION: ICD-10-CM

## 2020-11-03 DIAGNOSIS — E78.5 DYSLIPIDEMIA: ICD-10-CM

## 2020-11-03 DIAGNOSIS — M05.79 SEROPOSITIVE RHEUMATOID ARTHRITIS OF MULTIPLE SITES (H): ICD-10-CM

## 2020-11-03 LAB
ALBUMIN SERPL-MCNC: 4.6 G/DL (ref 3.5–5)
ALBUMIN UR-MCNC: NEGATIVE MG/DL
ALP SERPL-CCNC: 61 U/L (ref 45–120)
ALT SERPL W P-5'-P-CCNC: 25 U/L (ref 0–45)
ANION GAP SERPL CALCULATED.3IONS-SCNC: 12 MMOL/L (ref 5–18)
APPEARANCE UR: CLEAR
AST SERPL W P-5'-P-CCNC: 34 U/L (ref 0–40)
BILIRUB SERPL-MCNC: 0.7 MG/DL (ref 0–1)
BILIRUB UR QL STRIP: NEGATIVE
BUN SERPL-MCNC: 16 MG/DL (ref 8–22)
CALCIUM SERPL-MCNC: 9.6 MG/DL (ref 8.5–10.5)
CHLORIDE BLD-SCNC: 102 MMOL/L (ref 98–107)
CHOLEST SERPL-MCNC: 219 MG/DL
CO2 SERPL-SCNC: 25 MMOL/L (ref 22–31)
COLOR UR AUTO: YELLOW
CREAT SERPL-MCNC: 0.96 MG/DL (ref 0.6–1.1)
ERYTHROCYTE [DISTWIDTH] IN BLOOD BY AUTOMATED COUNT: 11.9 % (ref 11–14.5)
FASTING STATUS PATIENT QL REPORTED: YES
GFR SERPL CREATININE-BSD FRML MDRD: 60 ML/MIN/1.73M2
GLUCOSE BLD-MCNC: 100 MG/DL (ref 70–125)
GLUCOSE UR STRIP-MCNC: NEGATIVE MG/DL
HCT VFR BLD AUTO: 42.9 % (ref 35–47)
HDLC SERPL-MCNC: 63 MG/DL
HGB BLD-MCNC: 13.9 G/DL (ref 12–16)
HGB UR QL STRIP: NEGATIVE
KETONES UR STRIP-MCNC: NEGATIVE MG/DL
LDLC SERPL CALC-MCNC: 143 MG/DL
LEUKOCYTE ESTERASE UR QL STRIP: ABNORMAL
MCH RBC QN AUTO: 31.3 PG (ref 27–34)
MCHC RBC AUTO-ENTMCNC: 32.4 G/DL (ref 32–36)
MCV RBC AUTO: 96 FL (ref 80–100)
NITRATE UR QL: NEGATIVE
PH UR STRIP: 6 [PH] (ref 5–8)
PLATELET # BLD AUTO: 346 THOU/UL (ref 140–440)
PMV BLD AUTO: 7.4 FL (ref 7–10)
POTASSIUM BLD-SCNC: 4.2 MMOL/L (ref 3.5–5)
PROT SERPL-MCNC: 7.3 G/DL (ref 6–8)
RBC # BLD AUTO: 4.45 MILL/UL (ref 3.8–5.4)
SODIUM SERPL-SCNC: 139 MMOL/L (ref 136–145)
SP GR UR STRIP: 1.02 (ref 1–1.03)
TRIGL SERPL-MCNC: 63 MG/DL
TSH SERPL DL<=0.005 MIU/L-ACNC: 1.73 UIU/ML (ref 0.3–5)
UROBILINOGEN UR STRIP-ACNC: ABNORMAL
WBC: 5.4 THOU/UL (ref 4–11)

## 2020-11-04 ENCOUNTER — COMMUNICATION - HEALTHEAST (OUTPATIENT)
Dept: INTERNAL MEDICINE | Facility: CLINIC | Age: 55
End: 2020-11-04

## 2020-11-04 LAB
25(OH)D3 SERPL-MCNC: 51.8 NG/ML (ref 30–80)
25(OH)D3 SERPL-MCNC: 51.8 NG/ML (ref 30–80)
BACTERIA SPEC CULT: NO GROWTH

## 2020-11-05 ENCOUNTER — OFFICE VISIT - HEALTHEAST (OUTPATIENT)
Dept: RHEUMATOLOGY | Facility: CLINIC | Age: 55
End: 2020-11-05

## 2020-11-05 DIAGNOSIS — R76.8 POSITIVE ANTI-CCP TEST: ICD-10-CM

## 2020-11-05 DIAGNOSIS — M15.0 PRIMARY OSTEOARTHRITIS INVOLVING MULTIPLE JOINTS: ICD-10-CM

## 2020-11-05 DIAGNOSIS — M05.79 SEROPOSITIVE RHEUMATOID ARTHRITIS OF MULTIPLE SITES (H): ICD-10-CM

## 2020-11-05 DIAGNOSIS — Z79.899 HIGH RISK MEDICATION USE: ICD-10-CM

## 2020-11-18 ENCOUNTER — COMMUNICATION - HEALTHEAST (OUTPATIENT)
Dept: RHEUMATOLOGY | Facility: CLINIC | Age: 55
End: 2020-11-18

## 2020-11-18 DIAGNOSIS — M05.79 SEROPOSITIVE RHEUMATOID ARTHRITIS OF MULTIPLE SITES (H): ICD-10-CM

## 2020-11-26 ENCOUNTER — COMMUNICATION - HEALTHEAST (OUTPATIENT)
Dept: RHEUMATOLOGY | Facility: CLINIC | Age: 55
End: 2020-11-26

## 2020-11-26 DIAGNOSIS — M05.79 SEROPOSITIVE RHEUMATOID ARTHRITIS OF MULTIPLE SITES (H): ICD-10-CM

## 2020-12-03 ENCOUNTER — RECORDS - HEALTHEAST (OUTPATIENT)
Dept: BONE DENSITY | Facility: CLINIC | Age: 55
End: 2020-12-03

## 2020-12-03 ENCOUNTER — RECORDS - HEALTHEAST (OUTPATIENT)
Dept: ADMINISTRATIVE | Facility: OTHER | Age: 55
End: 2020-12-03

## 2020-12-03 DIAGNOSIS — M05.79 RHEUMATOID ARTHRITIS WITH RHEUMATOID FACTOR OF MULTIPLE SITES WITHOUT ORGAN OR SYSTEMS INVOLVEMENT (H): ICD-10-CM

## 2020-12-03 DIAGNOSIS — Z78.0 ASYMPTOMATIC MENOPAUSAL STATE: ICD-10-CM

## 2020-12-30 ENCOUNTER — COMMUNICATION - HEALTHEAST (OUTPATIENT)
Dept: INTERNAL MEDICINE | Facility: CLINIC | Age: 55
End: 2020-12-30

## 2020-12-30 DIAGNOSIS — Z80.3 FAMILY HISTORY OF MALIGNANT NEOPLASM OF BREAST: ICD-10-CM

## 2021-01-01 ENCOUNTER — COMMUNICATION - HEALTHEAST (OUTPATIENT)
Dept: INTERNAL MEDICINE | Facility: CLINIC | Age: 56
End: 2021-01-01

## 2021-01-26 ENCOUNTER — COMMUNICATION - HEALTHEAST (OUTPATIENT)
Dept: RHEUMATOLOGY | Facility: CLINIC | Age: 56
End: 2021-01-26

## 2021-01-26 DIAGNOSIS — M05.79 SEROPOSITIVE RHEUMATOID ARTHRITIS OF MULTIPLE SITES (H): ICD-10-CM

## 2021-02-02 ENCOUNTER — AMBULATORY - HEALTHEAST (OUTPATIENT)
Dept: RHEUMATOLOGY | Facility: CLINIC | Age: 56
End: 2021-02-02

## 2021-02-02 ENCOUNTER — AMBULATORY - HEALTHEAST (OUTPATIENT)
Dept: LAB | Facility: CLINIC | Age: 56
End: 2021-02-02

## 2021-02-02 DIAGNOSIS — M05.79 SEROPOSITIVE RHEUMATOID ARTHRITIS OF MULTIPLE SITES (H): ICD-10-CM

## 2021-02-02 LAB
ALBUMIN SERPL-MCNC: 4.2 G/DL (ref 3.5–5)
ALT SERPL W P-5'-P-CCNC: 23 U/L (ref 0–45)
CREAT SERPL-MCNC: 0.99 MG/DL (ref 0.6–1.1)
ERYTHROCYTE [DISTWIDTH] IN BLOOD BY AUTOMATED COUNT: 13.4 % (ref 11–14.5)
GFR SERPL CREATININE-BSD FRML MDRD: 58 ML/MIN/1.73M2
HCT VFR BLD AUTO: 39.2 % (ref 35–47)
HGB BLD-MCNC: 12.7 G/DL (ref 12–16)
MCH RBC QN AUTO: 30.8 PG (ref 27–34)
MCHC RBC AUTO-ENTMCNC: 32.4 G/DL (ref 32–36)
MCV RBC AUTO: 95 FL (ref 80–100)
PLATELET # BLD AUTO: 274 THOU/UL (ref 140–440)
PMV BLD AUTO: 8.6 FL (ref 7–10)
RBC # BLD AUTO: 4.13 MILL/UL (ref 3.8–5.4)
WBC: 5.2 THOU/UL (ref 4–11)

## 2021-02-03 ENCOUNTER — COMMUNICATION - HEALTHEAST (OUTPATIENT)
Dept: RHEUMATOLOGY | Facility: CLINIC | Age: 56
End: 2021-02-03

## 2021-02-03 DIAGNOSIS — M05.79 SEROPOSITIVE RHEUMATOID ARTHRITIS OF MULTIPLE SITES (H): ICD-10-CM

## 2021-02-08 ENCOUNTER — OFFICE VISIT - HEALTHEAST (OUTPATIENT)
Dept: RHEUMATOLOGY | Facility: CLINIC | Age: 56
End: 2021-02-08

## 2021-02-08 DIAGNOSIS — N18.31 STAGE 3A CHRONIC KIDNEY DISEASE (H): ICD-10-CM

## 2021-02-08 DIAGNOSIS — M15.0 PRIMARY OSTEOARTHRITIS INVOLVING MULTIPLE JOINTS: ICD-10-CM

## 2021-02-08 DIAGNOSIS — Z79.899 HIGH RISK MEDICATION USE: ICD-10-CM

## 2021-02-08 DIAGNOSIS — R76.8 POSITIVE ANTI-CCP TEST: ICD-10-CM

## 2021-02-08 DIAGNOSIS — M05.79 SEROPOSITIVE RHEUMATOID ARTHRITIS OF MULTIPLE SITES (H): ICD-10-CM

## 2021-02-18 ENCOUNTER — HOSPITAL ENCOUNTER (OUTPATIENT)
Dept: MAMMOGRAPHY | Facility: CLINIC | Age: 56
Discharge: HOME | End: 2021-02-18
Attending: INTERNAL MEDICINE

## 2021-02-18 DIAGNOSIS — Z80.3 FAMILY HISTORY OF MALIGNANT NEOPLASM OF BREAST: ICD-10-CM

## 2021-03-24 ENCOUNTER — AMBULATORY - HEALTHEAST (OUTPATIENT)
Dept: NURSING | Facility: CLINIC | Age: 56
End: 2021-03-24

## 2021-04-01 ENCOUNTER — RECORDS - HEALTHEAST (OUTPATIENT)
Dept: ADMINISTRATIVE | Facility: OTHER | Age: 56
End: 2021-04-01

## 2021-04-14 ENCOUNTER — AMBULATORY - HEALTHEAST (OUTPATIENT)
Dept: NURSING | Facility: CLINIC | Age: 56
End: 2021-04-14

## 2021-04-19 ENCOUNTER — RECORDS - HEALTHEAST (OUTPATIENT)
Dept: ADMINISTRATIVE | Facility: OTHER | Age: 56
End: 2021-04-19

## 2021-05-04 ENCOUNTER — AMBULATORY - HEALTHEAST (OUTPATIENT)
Dept: LAB | Facility: CLINIC | Age: 56
End: 2021-05-04

## 2021-05-04 DIAGNOSIS — M05.79 SEROPOSITIVE RHEUMATOID ARTHRITIS OF MULTIPLE SITES (H): ICD-10-CM

## 2021-05-04 LAB
ALBUMIN SERPL-MCNC: 4 G/DL (ref 3.5–5)
ALT SERPL W P-5'-P-CCNC: 28 U/L (ref 0–45)
CREAT SERPL-MCNC: 0.98 MG/DL (ref 0.6–1.1)
ERYTHROCYTE [DISTWIDTH] IN BLOOD BY AUTOMATED COUNT: 13.4 % (ref 11–14.5)
GFR SERPL CREATININE-BSD FRML MDRD: 58 ML/MIN/1.73M2
HCT VFR BLD AUTO: 40.2 % (ref 35–47)
HGB BLD-MCNC: 12.9 G/DL (ref 12–16)
MCH RBC QN AUTO: 30.5 PG (ref 27–34)
MCHC RBC AUTO-ENTMCNC: 32.1 G/DL (ref 32–36)
MCV RBC AUTO: 95 FL (ref 80–100)
PLATELET # BLD AUTO: 292 THOU/UL (ref 140–440)
PMV BLD AUTO: 9 FL (ref 7–10)
RBC # BLD AUTO: 4.23 MILL/UL (ref 3.8–5.4)
WBC: 5.6 THOU/UL (ref 4–11)

## 2021-05-13 ENCOUNTER — RECORDS - HEALTHEAST (OUTPATIENT)
Dept: RHEUMATOLOGY | Facility: CLINIC | Age: 56
End: 2021-05-13

## 2021-05-16 ENCOUNTER — COMMUNICATION - HEALTHEAST (OUTPATIENT)
Dept: INTERNAL MEDICINE | Facility: CLINIC | Age: 56
End: 2021-05-16

## 2021-05-16 DIAGNOSIS — E03.9 HYPOTHYROIDISM: ICD-10-CM

## 2021-05-17 ENCOUNTER — RECORDS - HEALTHEAST (OUTPATIENT)
Dept: RHEUMATOLOGY | Facility: CLINIC | Age: 56
End: 2021-05-17

## 2021-05-18 RX ORDER — LEVOTHYROXINE SODIUM 125 UG/1
TABLET ORAL
Qty: 90 TABLET | Refills: 1 | Status: SHIPPED | OUTPATIENT
Start: 2021-05-18 | End: 2022-01-26

## 2021-05-19 ENCOUNTER — RECORDS - HEALTHEAST (OUTPATIENT)
Dept: RHEUMATOLOGY | Facility: CLINIC | Age: 56
End: 2021-05-19

## 2021-05-25 ENCOUNTER — RECORDS - HEALTHEAST (OUTPATIENT)
Dept: ADMINISTRATIVE | Facility: CLINIC | Age: 56
End: 2021-05-25

## 2021-05-27 ENCOUNTER — RECORDS - HEALTHEAST (OUTPATIENT)
Dept: ADMINISTRATIVE | Facility: CLINIC | Age: 56
End: 2021-05-27

## 2021-05-27 NOTE — TELEPHONE ENCOUNTER
Due for labs    Rx renewed per Medication Renewal Policy. Medication was last renewed on 9/4/18.    Vane Garcia, [facility] Triage/Med Refill 4/10/2019     Requested Prescriptions   Pending Prescriptions Disp Refills     levothyroxine (SYNTHROID, LEVOTHROID) 125 MCG tablet [Pharmacy Med Name: L-THYROXINE (SYNTHROID) TABS 125MCG] 90 tablet 1     Sig: TAKE 1 TABLET DAILY       Thyroid Hormones Protocol Passed - 4/9/2019  8:23 PM        Passed - Provider visit in past 12 months or next 3 months     Last office visit with prescriber/PCP: 3/20/2015 Emily Bustillo MD OR same dept: 1/18/2019 Jenaro Rich CNP OR same specialty: 1/18/2019 Jenaro Rich CNP  Last physical: 5/9/2018 Last MTM visit: Visit date not found   Next visit within 3 mo: Visit date not found  Next physical within 3 mo: Visit date not found  Prescriber OR PCP: Emily Bustillo MD  Last diagnosis associated with med order: 1. Hypothyroidism  - levothyroxine (SYNTHROID, LEVOTHROID) 125 MCG tablet [Pharmacy Med Name: L-THYROXINE (SYNTHROID) TABS 125MCG]; TAKE 1 TABLET DAILY  Dispense: 90 tablet; Refill: 1    If protocol passes may refill for 12 months if within 3 months of last provider visit (or a total of 15 months).             Passed - TSH on file in past 12 months for patient age 12 & older     TSH   Date Value Ref Range Status   05/14/2018 3.61 0.30 - 5.00 uIU/mL Final

## 2021-05-28 ENCOUNTER — RECORDS - HEALTHEAST (OUTPATIENT)
Dept: ADMINISTRATIVE | Facility: CLINIC | Age: 56
End: 2021-05-28

## 2021-05-28 ASSESSMENT — ASTHMA QUESTIONNAIRES: ACT_TOTALSCORE: 25

## 2021-05-28 NOTE — PROGRESS NOTES
"ASSESSMENT AND PLAN:  Isabela Olivo 53 y.o. female is here for follow-up.  She has seropositive anti-CCP, rheumatoid arthritis, osteoarthritis, she is doing great with the combination of methotrexate, sulfasalazine, hydroxychloroquine.  She takes folic acid.  Her recent labs are within normal range.  She had is going to have her eyes examined 2 weeks.  There is been no flareup during the past 6 months.  Management of OA reviewed.  Follow-up in 6 months.  Labs every 3.        Diagnoses and all orders for this visit:    Seropositive rheumatoid arthritis of multiple sites (H)  -     hydroxychloroquine (PLAQUENIL) 200 mg tablet; Take 1 tablet (200 mg total) by mouth 2 (two) times a day with meals.  Dispense: 180 tablet; Refill: 0  -     methotrexate 2.5 MG tablet; Take 8 tablets (20 mg total) by mouth once a week.  Dispense: 96 tablet; Refill: 0    Primary osteoarthritis involving multiple joints    High risk medication use    Positive anti-CCP test        HISTORY OF PRESENTING ILLNESS:  Isabela Olivo 53 y.o.  is here for followup of Rheumatoid Arthritis.  This is seropositive, anti-CCP antibody positive.  She has done very well with the current regimen as far as her RA is concerned.  Apart from mild discomfort in her right hand which is epicentered at the fifth digit PIP she noted 0 pain and other joint areas, 2.0/10 there, with activity, without swelling, no history of trauma, hardly takes acetaminophen.  There is no sustained stiffness in the morning.  Recent labs are within normal range.  She no longer has the discomfort in her feet thought to be secondary to \"capsulitis\" per podiatry.  Change in shoes has helped.   She has mild discomfort in her left knee where she had corticosteroid injection in the past and continues to do well.  There is no sustained stiffness in the morning there is no fever weight loss blurry vision eye redness nausea cough or rash.  She notified today how she is managing her multiple medications " across the day and is doing well there.    Her arthropathy presented several years ago. Onset was gradual. Her rheumatoid arthritis  symptoms are stable.Symptoms included joint pain, joint swelling and morning stiffness and were of moderate severity.Symptoms are made worse by: nothing. Symptoms are helped by current regimen.  Patient denies associated alopecia, fatigue, nausea, new headache, nodules, oral ulcers, rashes/photosensitive, Raynaud's and seizures.  Overall disease activity:  improved. Limitation on activities as noted in the MDHAQ scanned in the EMR.  Further historical information, including ROS as noted in the multidimensional health assessment questionnaire scanned in the EMR and in the assessment and plan section.  She had knee injected here last visit 10  months ago and continues to derive great benefit from that.  Rheumatoid Arthritis Disease Activity Measure used: RAPID3, reviewed  today and scanned in the chart.    ALLERGIES:Dilantin infatabs [phenytoin] and Phenytoin sodium extended    PAST MEDICAL/ACTIVE PROBLEMS/MEDICATION/SOCIAL DATA  No past medical history on file.  Social History     Tobacco Use   Smoking Status Never Smoker   Smokeless Tobacco Never Used     Patient Active Problem List   Diagnosis     Mild Persistent Asthma     Hypothyroidism     Hypertension     Vitamin D Deficiency     Dyslipidemia     Seropositive rheumatoid arthritis of multiple sites (H)     High risk medication use     Positive anti-CCP test     Primary osteoarthritis of both knees     Primary osteoarthritis involving multiple joints     Current Outpatient Medications   Medication Sig Dispense Refill     albuterol (PROAIR HFA;PROVENTIL HFA;VENTOLIN HFA) 90 mcg/actuation inhaler Inhale 2 puffs every 6 (six) hours as needed for wheezing. 1 each 0     cholecalciferol, vitamin D3, (CHOLECALCIFEROL) 1,000 unit tablet Take 1,000 Units by mouth daily.       folic acid (FOLVITE) 1 MG tablet TAKE 1 TABLET DAILY 90 tablet 3      hydroCHLOROthiazide (HYDRODIURIL) 25 MG tablet TAKE 1 TABLET DAILY 90 tablet 3     hydroxychloroquine (PLAQUENIL) 200 mg tablet TAKE 1 TABLET TWICE A DAY WITH MEALS 180 tablet 0     levothyroxine (SYNTHROID, LEVOTHROID) 125 MCG tablet TAKE 1 TABLET DAILY 90 tablet 1     lisinopril (PRINIVIL,ZESTRIL) 20 MG tablet TAKE 1 TABLET DAILY 90 tablet 3     methotrexate 2.5 MG tablet TAKE 8 TABLETS ONCE A WEEK 96 tablet 0     sulfaSALAzine (AZULFIDINE) 500 mg tablet TAKE 2 TABLETS TWICE A  tablet 0     No current facility-administered medications for this visit.        DETAILED EXAMINATION  05/02/19  :  There were no vitals filed for this visit.  Alert oriented. Head including the face is examined for malar rash, heliotropes, scarring, lupus pernio. Eyes examined for redness such as in episcleritis/scleritis, periorbital lesions.   Neck/ Face examined for parotid gland swelling, range of motion of neck.  Left upper and lower and right upper and lower extremities examined for tenderness, swelling, warmth of the appendicular joints, range of motion, edema, rash.  Some of the important findings included:She does not have evidence of synovitis in any of the palpable joints of the upper extremities.  No significant deformities of the digits.  No JLT effusion or warmth of the knees.  Mild tenderness of the right fifth digit PIP.    LAB / IMAGING DATA:  ALT   Date Value Ref Range Status   04/30/2019 19 0 - 45 U/L Final   01/29/2019 21 0 - 45 U/L Final   10/30/2018 17 0 - 45 U/L Final     Albumin   Date Value Ref Range Status   04/30/2019 4.1 3.5 - 5.0 g/dL Final   01/29/2019 3.8 3.5 - 5.0 g/dL Final   10/30/2018 4.2 3.5 - 5.0 g/dL Final     Creatinine   Date Value Ref Range Status   04/30/2019 0.93 0.60 - 1.10 mg/dL Final   01/29/2019 0.94 0.60 - 1.10 mg/dL Final   10/30/2018 1.00 0.60 - 1.10 mg/dL Final       WBC   Date Value Ref Range Status   04/30/2019 5.3 4.0 - 11.0 thou/uL Final   01/29/2019 5.9 4.0 - 11.0 thou/uL  Final   08/04/2015 6.1 4.0 - 11.0 thou/uL Final   06/02/2015 6.4 4.0 - 11.0 thou/uL Final     Hemoglobin   Date Value Ref Range Status   04/30/2019 13.2 12.0 - 16.0 g/dL Final   01/29/2019 12.5 12.0 - 16.0 g/dL Final   10/30/2018 13.3 12.0 - 16.0 g/dL Final     Platelets   Date Value Ref Range Status   04/30/2019 305 140 - 440 thou/uL Final   01/29/2019 374 140 - 440 thou/uL Final   10/30/2018 311 140 - 440 thou/uL Final       Lab Results   Component Value Date    RF 24 02/20/2013    SEDRATE 10 02/20/2013

## 2021-05-28 NOTE — TELEPHONE ENCOUNTER
Pt states she cannot see Sulfasalazine online on [facility] website and she is almost out. Pt requested MTX and HCQ be sent [facility] as well. I called [facility] pharmacy they got the sulfasalazine rx but it was voided out for some reason and they could not tell why. I gave [facility] a verbal rx for the sulfasalazine to make sure they get rx and can process.

## 2021-05-29 ENCOUNTER — RECORDS - HEALTHEAST (OUTPATIENT)
Dept: ADMINISTRATIVE | Facility: CLINIC | Age: 56
End: 2021-05-29

## 2021-05-30 ENCOUNTER — RECORDS - HEALTHEAST (OUTPATIENT)
Dept: ADMINISTRATIVE | Facility: CLINIC | Age: 56
End: 2021-05-30

## 2021-05-30 VITALS — HEIGHT: 66 IN | WEIGHT: 223.4 LBS | BODY MASS INDEX: 35.9 KG/M2

## 2021-05-30 VITALS — BODY MASS INDEX: 35.84 KG/M2 | HEIGHT: 66 IN | WEIGHT: 223 LBS

## 2021-05-30 NOTE — TELEPHONE ENCOUNTER
RN cannot approve Refill Request    RN can NOT refill this medication PCP messaged that patient is overdue for Labs. Last office visit: Visit date not found Last Physical: 5/9/2018 Last MTM visit: Visit date not found Last visit same specialty: 1/18/2019 Jenaro Rich CNP.  Next visit within 3 mo: Visit date not found  Next physical within 3 mo: Visit date not found      Edelmira Gooden, [facility] Triage/Med Refill 7/28/2019    Requested Prescriptions   Pending Prescriptions Disp Refills     lisinopril (PRINIVIL,ZESTRIL) 20 MG tablet [Pharmacy Med Name: LISINOPRIL TABS 20MG] 90 tablet 3     Sig: TAKE 1 TABLET DAILY       Ace Inhibitors Refill Protocol Failed - 7/28/2019 10:36 AM        Failed - Serum Potassium in past 12 months     No results found for: LN-POTASSIUM          Passed - PCP or prescribing provider visit in past 12 months       Last office visit with prescriber/PCP: Visit date not found OR same dept: 1/18/2019 Jenaro Rich CNP OR same specialty: 1/18/2019 Jenaro Rich CNP  Last physical: 5/9/2018 Last MTM visit: Visit date not found   Next visit within 3 mo: Visit date not found  Next physical within 3 mo: Visit date not found  Prescriber OR PCP: Emily Bustillo MD  Last diagnosis associated with med order: 1. Essential hypertension  - lisinopril (PRINIVIL,ZESTRIL) 20 MG tablet [Pharmacy Med Name: LISINOPRIL TABS 20MG]; TAKE 1 TABLET DAILY  Dispense: 90 tablet; Refill: 3  - hydroCHLOROthiazide (HYDRODIURIL) 25 MG tablet [Pharmacy Med Name: HYDROCHLOROTHIAZIDE TAB 25MG]; TAKE 1 TABLET DAILY  Dispense: 90 tablet; Refill: 3    If protocol passes may refill for 12 months if within 3 months of last provider visit (or a total of 15 months).             Passed - Blood pressure filed in past 12 months     BP Readings from Last 1 Encounters:   05/02/19 122/72             Passed - Serum Creatinine in past 12 months     Creatinine   Date Value Ref Range Status   04/30/2019 0.93 0.60 - 1.10 mg/dL  Final             hydroCHLOROthiazide (HYDRODIURIL) 25 MG tablet [Pharmacy Med Name: HYDROCHLOROTHIAZIDE TAB 25MG] 90 tablet 3     Sig: TAKE 1 TABLET DAILY       Diuretics/Combination Diuretics Refill Protocol  Failed - 7/28/2019 10:36 AM        Failed - Serum Potassium in past 12 months      No results found for: LN-POTASSIUM          Failed - Serum Sodium in past 12 months      No results found for: LN-SODIUM          Passed - Visit with PCP or prescribing provider visit in past 12 months     Last office visit with prescriber/PCP: Visit date not found OR same dept: 1/18/2019 Jenaro Rich CNP OR same specialty: 1/18/2019 Jenaro Rich CNP  Last physical: 5/9/2018 Last MTM visit: Visit date not found   Next visit within 3 mo: Visit date not found  Next physical within 3 mo: Visit date not found  Prescriber OR PCP: Emily Bustillo MD  Last diagnosis associated with med order: 1. Essential hypertension  - lisinopril (PRINIVIL,ZESTRIL) 20 MG tablet [Pharmacy Med Name: LISINOPRIL TABS 20MG]; TAKE 1 TABLET DAILY  Dispense: 90 tablet; Refill: 3  - hydroCHLOROthiazide (HYDRODIURIL) 25 MG tablet [Pharmacy Med Name: HYDROCHLOROTHIAZIDE TAB 25MG]; TAKE 1 TABLET DAILY  Dispense: 90 tablet; Refill: 3    If protocol passes may refill for 12 months if within 3 months of last provider visit (or a total of 15 months).             Passed - Blood pressure on file in past 12 months     BP Readings from Last 1 Encounters:   05/02/19 122/72             Passed - Serum Creatinine in past 12 months      Creatinine   Date Value Ref Range Status   04/30/2019 0.93 0.60 - 1.10 mg/dL Final

## 2021-05-31 ENCOUNTER — RECORDS - HEALTHEAST (OUTPATIENT)
Dept: ADMINISTRATIVE | Facility: CLINIC | Age: 56
End: 2021-05-31

## 2021-05-31 VITALS — WEIGHT: 229.4 LBS | BODY MASS INDEX: 37.03 KG/M2

## 2021-05-31 VITALS — WEIGHT: 229 LBS | BODY MASS INDEX: 36.8 KG/M2 | HEIGHT: 66 IN

## 2021-05-31 VITALS — BODY MASS INDEX: 36.9 KG/M2 | WEIGHT: 228.6 LBS

## 2021-05-31 NOTE — PROGRESS NOTES
Assessment:     Healthy female exam.   All preventive exams are up-to-date.  Fasting labs - she had blood draw this morning and I added FLP and TSH.  She will try trazodone for insomnia.    The following high BMI interventions were performed this visit: encouragement to exercise and weight monitoring  This note has been dictated using voice recognition software. Any grammatical or context distortions are unintentional and inherent to the software    1. Healthcare maintenance  Vitamin D, Total (25-Hydroxy)    Vitamin D, Total (25-Hydroxy)   2. Seropositive rheumatoid arthritis of multiple sites (H)     3. Hypothyroidism, unspecified type  Thyroid Stimulating Hormone (TSH)   4. Dyslipidemia  Lipid Profile   5. Hypertension  Urinalysis-UC if Indicated   6. Vitamin D deficiency  Vitamin D, Total (25-Hydroxy)    Vitamin D, Total (25-Hydroxy)   7. Insomnia, unspecified type  traZODone (DESYREL) 50 MG tablet         Patient Instructions   You can take 1-3 trazodone pill at bedtime.      Plan:          No follow-ups on file.    Subjective:       Chief Complaint   Patient presents with     Annual Exam     Mammo 2-11-19, DXA 3-28-17, Colon 1-10-14 q10, Pap 3-21-17       Isabela Olivo is a 54 y.o. female who presents for an annual exam.   No acute issues except trouble staying a sleep. Chronic medical problems reviewed.    Healthy Habits:   Regular Exercise: Yes  Sunscreen Use: Yes  Healthy Diet: Yes  Dental Visits Regularly: Yes  Seat Belt: Yes  Immunization status: up to date and documented.    Health Maintenance   Topic Date Due     HIV SCREENING  07/22/1980     PREVENTIVE CARE VISIT  05/09/2019     INFLUENZA VACCINE RULE BASED (1) 08/01/2019     PAP SMEAR  03/21/2020     MAMMOGRAM  02/11/2021     ADVANCE CARE PLANNING  02/26/2021     TD 18+ HE  06/06/2021     COLONOSCOPY  01/10/2024     HEPATITIS C SCREENING  Completed     TDAP ADULT ONE TIME DOSE  Completed     ASTHMA CONTROL TEST  Discontinued       Social History      Socioeconomic History     Marital status:      Spouse name: Not on file     Number of children: Not on file     Years of education: Not on file     Highest education level: Not on file   Occupational History     Not on file   Social Needs     Financial resource strain: Not on file     Food insecurity:     Worry: Not on file     Inability: Not on file     Transportation needs:     Medical: Not on file     Non-medical: Not on file   Tobacco Use     Smoking status: Never Smoker     Smokeless tobacco: Never Used   Substance and Sexual Activity     Alcohol use: No     Drug use: Not on file     Sexual activity: Yes   Lifestyle     Physical activity:     Days per week: Not on file     Minutes per session: Not on file     Stress: Not on file   Relationships     Social connections:     Talks on phone: Not on file     Gets together: Not on file     Attends Yazidism service: Not on file     Active member of club or organization: Not on file     Attends meetings of clubs or organizations: Not on file     Relationship status: Not on file     Intimate partner violence:     Fear of current or ex partner: Not on file     Emotionally abused: Not on file     Physically abused: Not on file     Forced sexual activity: Not on file   Other Topics Concern     Not on file   Social History Narrative     Not on file       Family History   Problem Relation Age of Onset     Breast cancer Mother 62     Hyperlipidemia Mother      Hypertension Mother      Diabetes Father      Hyperlipidemia Father      Hypertension Father      Heart disease Maternal Grandfather        Patient Active Problem List   Diagnosis     Asthma     Hypothyroidism     Hypertension     Vitamin D Deficiency     Dyslipidemia     Seropositive rheumatoid arthritis of multiple sites (H)     High risk medication use     Positive anti-CCP test     Primary osteoarthritis involving multiple joints       Current Outpatient Medications on File Prior to Visit   Medication  "Sig Dispense Refill     cholecalciferol, vitamin D3, (CHOLECALCIFEROL) 1,000 unit tablet Take 1,000 Units by mouth daily.       folic acid (FOLVITE) 1 MG tablet TAKE 1 TABLET DAILY 90 tablet 3     hydroCHLOROthiazide (HYDRODIURIL) 25 MG tablet TAKE 1 TABLET DAILY 90 tablet 3     hydroxychloroquine (PLAQUENIL) 200 mg tablet Take 1 tablet (200 mg total) by mouth 2 (two) times a day with meals. 180 tablet 0     levothyroxine (SYNTHROID, LEVOTHROID) 125 MCG tablet TAKE 1 TABLET DAILY 90 tablet 1     lisinopril (PRINIVIL,ZESTRIL) 20 MG tablet TAKE 1 TABLET DAILY 90 tablet 3     sulfaSALAzine (AZULFIDINE) 500 mg tablet TAKE 2 TABLETS TWICE A  tablet 0     albuterol (PROAIR HFA;PROVENTIL HFA;VENTOLIN HFA) 90 mcg/actuation inhaler Inhale 2 puffs every 6 (six) hours as needed for wheezing. 1 each 0     methotrexate 2.5 MG tablet Take 8 tablets (20 mg total) by mouth once a week. 96 tablet 0     No current facility-administered medications on file prior to visit.        Review of Systems  A 12 point comprehensive review of systems was negative except as noted in HPI.         Objective:      /84   Pulse 82   Ht 5' 6\" (1.676 m)   Wt (!) 227 lb 6.4 oz (103.1 kg)   SpO2 97%   BMI 36.70 kg/m      Body mass index is 36.7 kg/m .        Physical Exam:  General Appearance: Alert, cooperative, no distress, appears stated age.  Head: Normocephalic, without obvious abnormality, atraumatic  Eyes: PERRL, conjunctiva/corneas clear, EOM's intact  Ears: Normal TM's and external ear canals, both ears  Nose: Nares normal, septum midline,mucosa normal, no drainage  Throat: Lips, mucosa, and tongue normal; teeth and gums normal  Neck: Supple, symmetrical, trachea midline, no adenopathy;  thyroid: not enlarged, symmetric, no tenderness/mass/nodules; no carotid bruit or JVD  Back: Symmetric, no curvature, ROM normal, no CVA tenderness.  Lungs: Clear to auscultation bilaterally, respirations unlabored.  Breasts: No breast masses, " tenderness, asymmetry, or nipple discharge.  Heart: Regular rate and rhythm, S1 and S2 normal, no murmur, rub, or gallop.  Abdomen: Soft, non-tender, bowel sounds active all four quadrants,  no masses, no organomegaly.  Pelvic:declined  Extremities: Extremities normal, atraumatic, no cyanosis or edema.  Skin: Skin color, texture, turgor normal, no rashes or lesions.  Lymph nodes: Cervical, supraclavicular, and axillary nodes normal.  Neurologic: No focal neurological findings.

## 2021-06-01 VITALS — WEIGHT: 234.9 LBS | BODY MASS INDEX: 37.91 KG/M2

## 2021-06-01 VITALS — BODY MASS INDEX: 37.45 KG/M2 | WEIGHT: 232 LBS

## 2021-06-02 VITALS — BODY MASS INDEX: 36.48 KG/M2 | WEIGHT: 226 LBS

## 2021-06-02 VITALS — BODY MASS INDEX: 36.69 KG/M2 | WEIGHT: 227.3 LBS

## 2021-06-03 ENCOUNTER — OFFICE VISIT - HEALTHEAST (OUTPATIENT)
Dept: FAMILY MEDICINE | Facility: CLINIC | Age: 56
End: 2021-06-03

## 2021-06-03 VITALS
WEIGHT: 225 LBS | BODY MASS INDEX: 36.16 KG/M2 | HEIGHT: 66 IN | DIASTOLIC BLOOD PRESSURE: 64 MMHG | SYSTOLIC BLOOD PRESSURE: 126 MMHG

## 2021-06-03 VITALS — BODY MASS INDEX: 36.32 KG/M2 | WEIGHT: 225 LBS

## 2021-06-03 VITALS — HEIGHT: 66 IN | BODY MASS INDEX: 36.55 KG/M2 | WEIGHT: 227.4 LBS

## 2021-06-03 DIAGNOSIS — S76.111A STRAIN OF RIGHT QUADRICEPS, INITIAL ENCOUNTER: ICD-10-CM

## 2021-06-03 NOTE — PROGRESS NOTES
ASSESSMENT AND PLAN:  Isabela Olivo 54 y.o. female is here for follow-up of seropositive rheumatoid arthritis, osteoarthritis on triple regimen of methotrexate sulfasalazine hydroxychloroquine eyes examined in spring 2019, recent labs within acceptable range.  He is on folic acid.  She has noted being virtually pain-free with the exception of right fifth digit PIP and discomfort.  This is noted in the past.  OA management reviewed.  Continue the current regimen.  Follow-up in 6 months labs every 3 months.         Diagnoses and all orders for this visit:    Seropositive rheumatoid arthritis of multiple sites (H)  -     hydroxychloroquine (PLAQUENIL) 200 mg tablet; Take 1 tablet (200 mg total) by mouth 2 (two) times a day with meals.  Dispense: 180 tablet; Refill: 0  -     methotrexate 2.5 MG tablet; Take 8 tablets (20 mg total) by mouth once a week.  Dispense: 96 tablet; Refill: 0  -     sulfaSALAzine (AZULFIDINE) 500 mg tablet; Take 2 tablets (1,000 mg total) by mouth 2 (two) times a day.  Dispense: 360 tablet; Refill: 0  -     folic acid (FOLVITE) 1 MG tablet; TAKE 1 TABLET DAILY  Dispense: 90 tablet; Refill: 3    High risk medication use  -     folic acid (FOLVITE) 1 MG tablet; TAKE 1 TABLET DAILY  Dispense: 90 tablet; Refill: 3    Primary osteoarthritis involving multiple joints    Positive anti-CCP test        HISTORY OF PRESENTING ILLNESS:  Isabela Olivo 54 y.o.  is here for followup of Rheumatoid Arthritis.  This is seropositive, anti-CCP antibody positive.  She has done very well with the current regimen as far as her RA is concerned.  Apart from mild discomfort in her right hand which is epicentered at the fifth digit PIP she noted 0 pain and other joint areas, 2.0/10 there, with activity, without swelling, no history of trauma, hardly takes acetaminophen.  Despite the discomfort in her right fifth digit PIP show no noted that she is able to do all her day-to-day is going to be served by different health  "carrier.there is no sustained stiffness in the morning.  Recent labs are within normal range.  She no longer has the discomfort in her feet thought to be secondary to \"capsulitis\" per podiatry.  Change in shoes has helped.   She has mild discomfort in her left knee where she had corticosteroid injection in the past and continues to do well.  There is no sustained stiffness in the morning there is no fever weight loss blurry vision eye redness nausea cough or rash.  She notified today how she is managing her multiple medications across the day and is doing well there.    Her arthropathy presented several years ago. Onset was gradual. Her rheumatoid arthritis  symptoms are stable.Symptoms included joint pain, joint swelling and morning stiffness and were of moderate severity.Symptoms are made worse by: nothing. Symptoms are helped by current regimen.  Patient denies associated alopecia, fatigue, nausea, new headache, nodules, oral ulcers, rashes/photosensitive, Raynaud's and seizures.  Overall disease activity:  improved. Limitation on activities as noted in the MDHAQ scanned in the EMR.  Further historical information, including ROS as noted in the multidimensional health assessment questionnaire scanned in the EMR and in the assessment and plan section.  She had knee injected here last visit 10  months ago and continues to derive great benefit from that.  Rheumatoid Arthritis Disease Activity Measure used: RAPID3, reviewed  today and scanned in the chart.    ALLERGIES:Dilantin infatabs [phenytoin] and Phenytoin sodium extended    PAST MEDICAL/ACTIVE PROBLEMS/MEDICATION/SOCIAL DATA  No past medical history on file.  Social History     Tobacco Use   Smoking Status Never Smoker   Smokeless Tobacco Never Used     Patient Active Problem List   Diagnosis     Asthma     Hypothyroidism     Hypertension     Vitamin D Deficiency     Dyslipidemia     Seropositive rheumatoid arthritis of multiple sites (H)     High risk " "medication use     Positive anti-CCP test     Primary osteoarthritis involving multiple joints     Current Outpatient Medications   Medication Sig Dispense Refill     albuterol (PROAIR HFA;PROVENTIL HFA;VENTOLIN HFA) 90 mcg/actuation inhaler Inhale 2 puffs every 6 (six) hours as needed for wheezing. 1 each 0     cholecalciferol, vitamin D3, (CHOLECALCIFEROL) 1,000 unit tablet Take 1,000 Units by mouth daily.       folic acid (FOLVITE) 1 MG tablet TAKE 1 TABLET DAILY 90 tablet 3     hydroCHLOROthiazide (HYDRODIURIL) 25 MG tablet TAKE 1 TABLET DAILY 90 tablet 3     hydroxychloroquine (PLAQUENIL) 200 mg tablet TAKE 1 TABLET TWICE A DAY WITH MEALS 180 tablet 0     levothyroxine (SYNTHROID, LEVOTHROID) 125 MCG tablet Take 1 tablet (125 mcg total) by mouth daily. 90 tablet 3     lisinopril (PRINIVIL,ZESTRIL) 20 MG tablet TAKE 1 TABLET DAILY 90 tablet 3     sulfaSALAzine (AZULFIDINE) 500 mg tablet TAKE 2 TABLETS TWICE A  tablet 0     traZODone (DESYREL) 50 MG tablet Take 1 tablet (50 mg total) by mouth at bedtime. 90 tablet 3     methotrexate 2.5 MG tablet Take 8 tablets (20 mg total) by mouth once a week. 96 tablet 0     No current facility-administered medications for this visit.        DETAILED EXAMINATION  11/07/19  :  Vitals:    11/07/19 0721   BP: 126/64   Patient Site: Right Arm   Patient Position: Sitting   Cuff Size: Adult Large   Weight: (!) 225 lb (102.1 kg)   Height: 5' 6\" (1.676 m)     Alert oriented. Head including the face is examined for malar rash, heliotropes, scarring, lupus pernio. Eyes examined for redness such as in episcleritis/scleritis, periorbital lesions.   Neck/ Face examined for parotid gland swelling, range of motion of neck.  Left upper and lower and right upper and lower extremities examined for tenderness, swelling, warmth of the appendicular joints, range of motion, edema, rash.  Some of the important findings included: She does not have synovitis in any of the palpable joints of " upper extremities no JLT effusion warmth fifth digit PIP where she noted pain as noted earlier.  There is no triggering.  There is no tenderness along the flexor tendon.  There is no dactylitis.  LAB / IMAGING DATA:  ALT   Date Value Ref Range Status   11/05/2019 17 0 - 45 U/L Final   08/02/2019 27 0 - 45 U/L Final   04/30/2019 19 0 - 45 U/L Final     Albumin   Date Value Ref Range Status   11/05/2019 4.4 3.5 - 5.0 g/dL Final   08/02/2019 4.1 3.5 - 5.0 g/dL Final   04/30/2019 4.1 3.5 - 5.0 g/dL Final     Creatinine   Date Value Ref Range Status   11/05/2019 1.01 0.60 - 1.10 mg/dL Final   08/02/2019 0.99 0.60 - 1.10 mg/dL Final   04/30/2019 0.93 0.60 - 1.10 mg/dL Final       WBC   Date Value Ref Range Status   11/05/2019 5.1 4.0 - 11.0 thou/uL Final   08/02/2019 5.4 4.0 - 11.0 thou/uL Final   08/04/2015 6.1 4.0 - 11.0 thou/uL Final   06/02/2015 6.4 4.0 - 11.0 thou/uL Final     Hemoglobin   Date Value Ref Range Status   11/05/2019 13.6 12.0 - 16.0 g/dL Final   08/02/2019 12.9 12.0 - 16.0 g/dL Final   04/30/2019 13.2 12.0 - 16.0 g/dL Final     Platelets   Date Value Ref Range Status   11/05/2019 313 140 - 440 thou/uL Final   08/02/2019 288 140 - 440 thou/uL Final   04/30/2019 305 140 - 440 thou/uL Final       Lab Results   Component Value Date    RF 24 02/20/2013    SEDRATE 10 02/20/2013

## 2021-06-03 NOTE — TELEPHONE ENCOUNTER
Can the patient get influenza shot and Shingrix tomorrow during CSS visit? Please advise whether to have pt schedule these shots on separate encounters.       Ese Hyman, [facility]  2:47 PM  11/14/2019

## 2021-06-03 NOTE — TELEPHONE ENCOUNTER
If her Rheumatologist approved these vaccines, she can have it done with CSS. Ask if she discussed with Rheumatologist?

## 2021-06-04 VITALS
BODY MASS INDEX: 36.7 KG/M2 | DIASTOLIC BLOOD PRESSURE: 72 MMHG | WEIGHT: 227.4 LBS | HEART RATE: 84 BPM | TEMPERATURE: 98.6 F | OXYGEN SATURATION: 96 % | SYSTOLIC BLOOD PRESSURE: 130 MMHG

## 2021-06-04 VITALS
BODY MASS INDEX: 33.85 KG/M2 | HEART RATE: 76 BPM | WEIGHT: 210.6 LBS | SYSTOLIC BLOOD PRESSURE: 135 MMHG | DIASTOLIC BLOOD PRESSURE: 84 MMHG | HEIGHT: 66 IN | OXYGEN SATURATION: 98 %

## 2021-06-05 VITALS
WEIGHT: 206.5 LBS | HEIGHT: 66 IN | HEART RATE: 87 BPM | SYSTOLIC BLOOD PRESSURE: 128 MMHG | DIASTOLIC BLOOD PRESSURE: 76 MMHG | BODY MASS INDEX: 33.19 KG/M2

## 2021-06-05 NOTE — TELEPHONE ENCOUNTER
Patient has a future lab appointment on 02/05/20 . There are no lab orders could you review chart and place orders? Thank you.

## 2021-06-06 NOTE — TELEPHONE ENCOUNTER
Riki    Refill:  - methotrexate 2.5 MG tablet   - sulfaSALAzine (AZULFIDINE) 500 mg tablet    Send as: 90 days    Send to: [facility] - [place], [place] - [address]

## 2021-06-07 NOTE — TELEPHONE ENCOUNTER
Refill Request  Did you contact pharmacy: No  Medication name:   Requested Prescriptions     Pending Prescriptions Disp Refills     levothyroxine (SYNTHROID, LEVOTHROID) 125 MCG tablet 90 tablet 3     Sig: Take 1 tablet (125 mcg total) by mouth daily.     Who prescribed the medication: Emily Bustillo MD  Requested Pharmacy: [facility]  Is patient out of medication: No  Patient notified refills processed in 3 business days:  yes  Okay to leave a detailed message: no

## 2021-06-08 ENCOUNTER — RECORDS - HEALTHEAST (OUTPATIENT)
Dept: INTERNAL MEDICINE | Facility: CLINIC | Age: 56
End: 2021-06-08

## 2021-06-08 ENCOUNTER — RECORDS - HEALTHEAST (OUTPATIENT)
Dept: ADMINISTRATIVE | Facility: OTHER | Age: 56
End: 2021-06-08

## 2021-06-08 DIAGNOSIS — M05.79 SEROPOSITIVE RHEUMATOID ARTHRITIS OF MULTIPLE SITES (H): ICD-10-CM

## 2021-06-08 RX ORDER — HYDROXYCHLOROQUINE SULFATE 200 MG/1
200 TABLET, FILM COATED ORAL 2 TIMES DAILY WITH MEALS
Qty: 180 TABLET | Refills: 0 | Status: SHIPPED | OUTPATIENT
Start: 2021-06-08 | End: 2021-07-18

## 2021-06-08 NOTE — TELEPHONE ENCOUNTER
Central PA team  [phone]  Pool: [facility] ([id number])          PA has been initiated.       PA form completed and faxed insurance via Cover My Meds     Key:  [id number] - PA Case ID: [id number]     Medication:  Hydroxychloroquine Sulfate 200MG tablets    Insurance:  [employer]        Response will be received via fax and may take up to 5-10 business days depending on plan

## 2021-06-08 NOTE — PROGRESS NOTES
"Isabela Olivo is a 54 y.o. female who is being evaluated via a billable video visit.      The patient has been notified of following:     \"This video visit will be conducted via a call between you and your physician/provider. We have found that certain health care needs can be provided without the need for an in-person physical exam.  This service lets us provide the care you need with a video conversation.  If a prescription is necessary we can send it directly to your pharmacy.  If lab work is needed we can place an order for that and you can then stop by our lab to have the test done at a later time.    Video visits are billed at different rates depending on your insurance coverage. Please reach out to your insurance provider with any questions.    If during the course of the call the physician/provider feels a video visit is not appropriate, you will not be charged for this service.\"    Patient has given verbal consent to a Video visit? Yes    Patient would like to receive their AVS by AVS Preference: Stacy.    Patient would like the video invitation sent by: Text to cell phone: [phone]    Will anyone else be joining your video visit? No          Video-Visit Details    Type of service:  Video Visit    Originating Location (pt. Location): Home    Distant Location (provider location):  [facility]     Platform [facility] for Video Visit[facility]      ASSESSMENT AND PLAN:    Diagnoses and all orders for this visit:    Seropositive rheumatoid arthritis of multiple sites (H)  -     hydroxychloroquine (PLAQUENIL) 200 mg tablet; Take 1 tablet (200 mg total) by mouth 2 (two) times a day with meals.  Dispense: 180 tablet; Refill: 0  -     methotrexate 2.5 MG tablet; Take 8 tablets (20 mg total) by mouth once a week.  Dispense: 96 tablet; Refill: 0    Positive anti-CCP test    High risk medication use    Primary osteoarthritis involving multiple joints          HISTORY OF PRESENTING ILLNESS:  Isabela Olivo 54 " y.o. is evaluated here via video link.  Follow-up of seropositive rheumatoid arthritis, osteoarthritis, on triple regimen sulfasalazine hydroxychloroquine methotrexate, doing great.  She has not had a flareup.  She works from home.  Obviously she has concerns around immunosuppression, and the viral pandemic.  We reviewed the data so far that theoretical worse is observations were reviewed.  Recent labs are reviewed minimal drop in GFR.  She does not take ibuprofen or Aleve regularly.  She noted no swelling in her joints she is able to do her day-to-day activities without difficulty.  She is going to stay the course will meet here in 3 months with labs prior.. ROS enquiry held for fever, ocular symptoms, rash, headache,  GI issues.  Today we also discussed the issues related to the current pandemic, the pros and cons of the current treatment plan, the CDC guidelines such as social distancing washing the hands covering the cough.  ALLERGIES:Dilantin infatabs [phenytoin] and Phenytoin sodium extended    PAST MEDICAL/ACTIVE PROBLEMS/MEDICATION/SOCIAL DATA  No past medical history on file.  Social History     Tobacco Use   Smoking Status Never Smoker   Smokeless Tobacco Never Used     Patient Active Problem List   Diagnosis     Asthma     Hypothyroidism     Hypertension     Vitamin D Deficiency     Dyslipidemia     Seropositive rheumatoid arthritis of multiple sites (H)     High risk medication use     Positive anti-CCP test     Primary osteoarthritis involving multiple joints     Current Outpatient Medications   Medication Sig Dispense Refill     albuterol (PROAIR HFA;PROVENTIL HFA;VENTOLIN HFA) 90 mcg/actuation inhaler Inhale 2 puffs every 6 (six) hours as needed for wheezing. 1 each 0     cholecalciferol, vitamin D3, (CHOLECALCIFEROL) 1,000 unit tablet Take 1,000 Units by mouth daily.       folic acid (FOLVITE) 1 MG tablet TAKE 1 TABLET DAILY 90 tablet 3     hydroCHLOROthiazide (HYDRODIURIL) 25 MG tablet TAKE 1 TABLET  DAILY 90 tablet 3     hydroxychloroquine (PLAQUENIL) 200 mg tablet Take 1 tablet (200 mg total) by mouth 2 (two) times a day with meals. 180 tablet 0     levothyroxine (SYNTHROID, LEVOTHROID) 125 MCG tablet Take 1 tablet (125 mcg total) by mouth daily. 90 tablet 3     lisinopril (PRINIVIL,ZESTRIL) 20 MG tablet TAKE 1 TABLET DAILY 90 tablet 3     methotrexate 2.5 MG tablet Take 8 tablets (20 mg total) by mouth once a week. 96 tablet 0     sulfaSALAzine (AZULFIDINE) 500 mg tablet TAKE 2 TABLETS BY MOUTH TWO TIMES DAILY 360 tablet 0     traZODone (DESYREL) 50 MG tablet Take 1 tablet (50 mg total) by mouth at bedtime. 90 tablet 3     No current facility-administered medications for this visit.          EXAMINATION:    Using the audio and video link as best as possible the constitutional, neck, neurologic, psych, skin, both upper extremities areas/organ system were evaluated during this assessment.  Some of the important findings: She noted ability to make a fist both sides and abduct her arms fully at the shoulders.      LAB / IMAGING DATA:  ALT   Date Value Ref Range Status   05/05/2020 21 0 - 45 U/L Final   02/05/2020 27 0 - 45 U/L Final   11/05/2019 17 0 - 45 U/L Final     Albumin   Date Value Ref Range Status   05/05/2020 4.2 3.5 - 5.0 g/dL Final   02/05/2020 4.2 3.5 - 5.0 g/dL Final   11/05/2019 4.4 3.5 - 5.0 g/dL Final     Creatinine   Date Value Ref Range Status   05/05/2020 1.02 0.60 - 1.10 mg/dL Final   02/05/2020 1.02 0.60 - 1.10 mg/dL Final   11/05/2019 1.01 0.60 - 1.10 mg/dL Final       WBC   Date Value Ref Range Status   05/05/2020 4.8 4.0 - 11.0 thou/uL Final   02/05/2020 4.9 4.0 - 11.0 thou/uL Final   08/04/2015 6.1 4.0 - 11.0 thou/uL Final   06/02/2015 6.4 4.0 - 11.0 thou/uL Final     Hemoglobin   Date Value Ref Range Status   05/05/2020 13.4 12.0 - 16.0 g/dL Final   02/05/2020 13.4 12.0 - 16.0 g/dL Final   11/05/2019 13.6 12.0 - 16.0 g/dL Final     Platelets   Date Value Ref Range Status   05/05/2020  291 140 - 440 thou/uL Final   02/05/2020 319 140 - 440 thou/uL Final   11/05/2019 313 140 - 440 thou/uL Final       Lab Results   Component Value Date    RF 24 02/20/2013    SEDRATE 10 02/20/2013     Duration of the call:8  Minutes  Call start: 400  pm  Call end:   408pm

## 2021-06-09 NOTE — TELEPHONE ENCOUNTER
Refill request received from patient's new mail order pharmacy via fax. Med orders pended as requested.

## 2021-06-09 NOTE — PROGRESS NOTES
Assessment:     Healthy female exam.   All preventive exams are up-to-date.  Non fasting labs will be done today.    The following high BMI interventions were performed this visit: encouragement to exercise and weight monitoring  This note has been dictated using voice recognition software. Any grammatical or context distortions are unintentional and inherent to the software    1. Health care maintenance  Gynecologic Cytology (PAP Smear)    Urinalysis-UC if Indicated    LDL Cholesterol, Direct    CANCELED: HM2(CBC w/o Differential)   2. Dyslipidemia     3. Hypothyroidism  Thyroid Stimulating Hormone (TSH)   4. Essential hypertension  Comprehensive Metabolic Panel   5. Vitamin D deficiency  Vitamin D, Total (25-Hydroxy)   6. Seropositive rheumatoid arthritis of multiple sites  DXA Bone Density Scan   7. Family history of osteoporosis  DXA Bone Density Scan         There are no Patient Instructions on file for this visit.    Plan:          Return in about 1 year (around 3/21/2018) for Annual physical.    Subjective:       Chief Complaint   Patient presents with     Annual Exam     [id number], colon-1- q10, pap-7-        Isabela Olivo is a 51 y.o. female who presents for an annual exam. Chronic medical problems reviewed. No acute issues today.    Healthy Habits:   Regular Exercise: Yes  Sunscreen Use: Yes  Healthy Diet: Yes  Dental Visits Regularly: Yes  Seat Belt: Yes  Immunization status: up to date and documented.    Health Maintenance   Topic Date Due     ASTHMA CONTROL TEST  1965     INFLUENZA VACCINE RULE BASED (1) 08/01/2016     MAMMOGRAM  12/14/2017     PAP SMEAR  07/31/2019     ADVANCE DIRECTIVES DISCUSSED WITH PATIENT  02/26/2021     TD 18+ HE  06/06/2021     COLONOSCOPY  01/10/2024     TDAP ADULT ONE TIME DOSE  Completed       Social History     Social History     Marital status:      Spouse name: N/A     Number of children: N/A     Years of education: N/A     Occupational  History     Not on file.     Social History Main Topics     Smoking status: Never Smoker     Smokeless tobacco: Not on file     Alcohol use No     Drug use: Not on file     Sexual activity: Yes     Other Topics Concern     Not on file     Social History Narrative       Family History   Problem Relation Age of Onset     Breast cancer Mother 62     Hyperlipidemia Mother      Hypertension Mother      Diabetes Father      Hyperlipidemia Father      Hypertension Father      Heart disease Maternal Grandfather        Patient Active Problem List   Diagnosis     Mild Persistent Asthma     Hypothyroidism     Hypertension     Vitamin D Deficiency     Dyslipidemia     Seropositive rheumatoid arthritis of multiple sites     Knee pain, bilateral     High risk medication use     Positive anti-CCP test     Primary osteoarthritis of both knees       Current Outpatient Prescriptions on File Prior to Visit   Medication Sig Dispense Refill     cholecalciferol, vitamin D3, (CHOLECALCIFEROL) 1,000 unit tablet Take 1,000 Units by mouth daily.       folic acid (FOLVITE) 1 MG tablet Take 1 tablet (1 mg total) by mouth daily. 30 tablet 11     hydroCHLOROthiazide (HYDRODIURIL) 25 MG tablet TAKE 1 TABLET BY MOUTH ONCE DAILY 90 tablet 0     hydroxychloroquine (PLAQUENIL) 200 mg tablet TAKE 1 TABLET TWICE A DAY WITH MEALS 180 tablet 0     levothyroxine (SYNTHROID, LEVOTHROID) 125 MCG tablet TAKE 1 TABLET BY MOUTH DAILY 90 tablet 0     lisinopril (PRINIVIL,ZESTRIL) 20 MG tablet TAKE 1 TABLET BY MOUTH ONCE DAILY 90 tablet 0     methotrexate 2.5 MG tablet Take 8 tablets (20 mg total) by mouth once a week. 32 tablet 1     sulfaSALAzine (AZULFIDINE) 500 mg tablet Take 2 tablets (1,000 mg total) by mouth 2 (two) times a day. 120 tablet 1     [DISCONTINUED] MULTIVITAMIN ORAL Take by mouth.       No current facility-administered medications on file prior to visit.        Review of Systems  A 12 point comprehensive review of systems was negative except  "as noted in HPI.         Objective:      Visit Vitals     /80     Pulse 74     Ht 5' 6\" (1.676 m)     Wt (!) 223 lb 6.4 oz (101.3 kg)     BMI 36.06 kg/m2       Body mass index is 36.06 kg/(m^2).        Physical Exam:  General Appearance: Alert, cooperative, no distress, appears stated age.  Head: Normocephalic, without obvious abnormality, atraumatic  Eyes: PERRL, conjunctiva/corneas clear, EOM's intact  Ears: Normal TM's and external ear canals, both ears  Nose: Nares normal, septum midline,mucosa normal, no drainage  Throat: Lips, mucosa, and tongue normal; teeth and gums normal  Neck: Supple, symmetrical, trachea midline, no adenopathy;  thyroid: not enlarged, symmetric, no tenderness/mass/nodules; no carotid bruit or JVD  Back: Symmetric, no curvature, ROM normal, no CVA tenderness.  Lungs: Clear to auscultation bilaterally, respirations unlabored.  Breasts: No breast masses, tenderness, asymmetry, or nipple discharge.  Heart: Regular rate and rhythm, S1 and S2 normal, no murmur, rub, or gallop.  Abdomen: Soft, non-tender, bowel sounds active all four quadrants,  no masses, no organomegaly.  Pelvic:Normal external genitalia, speculum exam done, PAP done, cervix normal, bimanual exam showed no adnexal tenderness.  Extremities: Extremities normal, atraumatic, no cyanosis or edema.  Skin: Skin color, texture, turgor normal, no rashes or lesions.  Lymph nodes: Cervical, supraclavicular, and axillary nodes normal.  Neurologic: No focal neurological findings.           "

## 2021-06-09 NOTE — PROGRESS NOTES
Office Visit - Follow Up   Isabela Olivo   54 y.o. female    Date of Visit: 6/19/2020    Chief Complaint   Patient presents with     Back Pain     Burning low back x 4 days - No injury        Assessment and Plan     Acute onset of lumbar axial low back pain 4 days so far, located in bilateral paraspinous muscles, nonradiating, nonradicular, started on Tuesday, June 16, after a vigorous back massage; physical exam normal except for being overweight, specifically no neurological findings; context of seropositive rheumatoid arthritis that mostly affects her hands    We will treat this as a lumbar muscle strain, and since she has been pretty miserable, will give prednisone 40 mg a day for between 5 to 7 days, no taper.  She has had satisfactory blood sugars, no diabetes.    She has had historical bouts of low back pain somewhat similar to this, but this one is worse than previous ones.  It started after the massage, which she recalls was a pretty vigorous.  The pain spread somewhat to her abdominal flanks bilaterally and somewhat around the front, yet I do not think this is radicular in nature because it is bilateral.  No red flag symptoms, specifically no disturbance of bowel or bladder function, fevers, weakness whatsoever in her lower extremities, or sensory loss.    She reports being more comfortable in standing position, pain tends to be worse sitting.  I told her to stay as mobile as possible, and continue to use her standing desk.  She needs to sleep on a supportive surface.  Her  is a snorer, and perhaps she might ask him to sleep in a different room for the short-term.    I told her she can also apply warm packs, or if she finds ice helps, okay to try ice.  Since not experiencing muscle cramps, I do not think we need to resort to muscle relaxants.    Seropositive rheumatoid arthritis, on a stable regimen of methotrexate, Plaquenil, and sulfasalazine which she reports works well for her.  The RA affects her  hands and wrists.  She was diagnosed  around age 48.  Because she is on sulfasalazine, I do not want to stack an NSAID on top of this.    Osteoarthritis involving multiple joints.  She recalls a cortisol shot into right knee in several years ago.    Overweight, with obese level BMI of 36.7; she knows that she needs to get her weight down which would be good for her back.  Admits to somewhat lax dietary discipline and not enough exercise.  She told me she knows what to do to fix this.    Essential hypertension, decent blood pressure today, on a stable regimen of lisinopril plus hydrochlorothiazide.    Seasonal allergic induced wheezing, for which he uses albuterol inhaler occasionally.         History of Present Illness       This 54 y.o. old woman comes in for evaluation of    Acute onset of lumbar axial low back pain, located in bilateral paraspinous muscles, nonradiating, nonradicular, started on Tuesday, June 16, after a vigorous back massage; physical exam normal except for being overweight, specifically no neurological findings; context of seropositive rheumatoid arthritis that mostly affects her hands    She has had historical bouts of low back pain somewhat similar to this, but this one is worse than previous ones.  It started after the massage, which she recalls was a pretty vigorous.  The pain spread somewhat to her abdominal flanks bilaterally and somewhat around the front, yet I do not think this is radicular in nature because it is bilateral.  No red flag symptoms, specifically no disturbance of bowel or bladder function, fevers, weakness whatsoever in her lower extremities, or sensory loss.    She reports being more comfortable in standing position, pain tends to be worse sitting.  I told her to stay as mobile as possible, and continue to use her standing desk.  She needs to sleep on a supportive surface.  Her  is a snorer, and perhaps she might ask him to sleep in a different room for the  short-term.      Seropositive rheumatoid arthritis of multiple sites (H)  -     hydroxychloroquine (PLAQUENIL) 200 mg tablet; Take 1 tablet (200 mg total) by mouth 2 (two) times a day with meals.  Dispense: 180 tablet; Refill: 0  -     methotrexate 2.5 MG tablet; Take 8 tablets (20 mg total) by mouth once a week.  Dispense: 96 tablet; Refill: 0     Positive anti-CCP test     High risk medication use     Primary osteoarthritis involving multiple joints     seropositive rheumatoid arthritis, osteoarthritis, on triple regimen sulfasalazine hydroxychloroquine methotrexate, doing great.  She has not had a flareup.  She works from home.     Wt Readings from Last 3 Encounters:   06/19/20 (!) 227 lb 6.4 oz (103.1 kg)   11/07/19 (!) 225 lb (102.1 kg)   08/02/19 (!) 227 lb 6.4 oz (103.1 kg)     BP Readings from Last 3 Encounters:   06/19/20 130/72   11/07/19 126/64   08/02/19 130/84       Lab Results   Component Value Date    WBC 4.8 05/05/2020    HGB 13.4 05/05/2020    HCT 41.2 05/05/2020     05/05/2020    CHOL 213 (H) 08/02/2019    TRIG 86 08/02/2019    HDL 63 08/02/2019    LDLDIRECT 161 (H) 03/21/2017    ALT 21 05/05/2020    AST 26 05/14/2018     05/14/2018    K 4.0 05/14/2018     05/14/2018    CREATININE 1.02 05/05/2020    BUN 13 05/14/2018    CO2 25 05/14/2018    TSH 3.49 08/02/2019         Medications, Allergies, Social, and Problem List   Current Outpatient Medications   Medication Sig Dispense Refill     cholecalciferol, vitamin D3, (CHOLECALCIFEROL) 1,000 unit tablet Take 1,000 Units by mouth daily.       folic acid (FOLVITE) 1 MG tablet TAKE 1 TABLET DAILY 90 tablet 3     hydroCHLOROthiazide (HYDRODIURIL) 25 MG tablet TAKE 1 TABLET DAILY 90 tablet 0     hydroxychloroquine (PLAQUENIL) 200 mg tablet Take 1 tablet (200 mg total) by mouth 2 (two) times a day with meals. 180 tablet 0     levothyroxine (SYNTHROID, LEVOTHROID) 125 MCG tablet Take 1 tablet (125 mcg total) by mouth daily. 90 tablet 3      lisinopriL (PRINIVIL,ZESTRIL) 20 MG tablet TAKE 1 TABLET DAILY 90 tablet 0     methotrexate 2.5 MG tablet Take 8 tablets (20 mg total) by mouth once a week. 96 tablet 0     sulfaSALAzine (AZULFIDINE) 500 mg tablet TAKE 2 TABLETS BY MOUTH TWO TIMES DAILY 360 tablet 0     traZODone (DESYREL) 50 MG tablet Take 1 tablet (50 mg total) by mouth at bedtime. 90 tablet 3     albuterol (PROAIR HFA;PROVENTIL HFA;VENTOLIN HFA) 90 mcg/actuation inhaler Inhale 2 puffs every 6 (six) hours as needed for wheezing. 1 each 0     No current facility-administered medications for this visit.      Allergies   Allergen Reactions     Dilantin Infatabs [Phenytoin]      Phenytoin Sodium Extended Hives     Social History     Tobacco Use     Smoking status: Never Smoker     Smokeless tobacco: Never Used   Substance Use Topics     Alcohol use: No     Drug use: Not on file     Patient Active Problem List   Diagnosis     Asthma     Hypothyroidism     Hypertension     Vitamin D Deficiency     Dyslipidemia     Seropositive rheumatoid arthritis of multiple sites (H)     High risk medication use     Positive anti-CCP test     Primary osteoarthritis involving multiple joints        Reviewed, reconciled and updated       Physical Exam   General Appearance:   Very pleasant, no distress, significantly overweight.    /72 (Patient Site: Right Arm, Patient Position: Sitting, Cuff Size: Adult Large)   Pulse 84   Temp 98.6  F (37  C) (Oral)   Wt (!) 227 lb 6.4 oz (103.1 kg)   SpO2 96%   BMI 36.70 kg/m      Able to rise easily from seated to standing position.  She localizes the painful areas to bilateral lumbar paraspinous muscles.  Normal motor strength lower extremities, able to stand on toes and on heels and squat  Normal sensation throughout lower extremities       Additional Information        David Mcgowan MD

## 2021-06-09 NOTE — PATIENT INSTRUCTIONS - HE
Acute onset of lumbar axial low back pain, located in bilateral paraspinous muscles, nonradiating, nonradicular, started on Tuesday, June 16, after a vigorous back massage; physical exam normal except for being overweight, specifically no neurological findings; context of seropositive rheumatoid arthritis that mostly affects her hands; we will treat this as a lumbar muscle strain, and since she has been pretty miserable, will give prednisone 40 mg a day for between 5 to 7 days, no taper.  She has had satisfactory blood sugars, no diabetes.    She has had historical bouts of low back pain somewhat similar to this, but this one is worse than previous ones.  It started after the massage, which she recalls was a pretty vigorous.  The pain spread somewhat to her abdominal flanks bilaterally and somewhat around the front, yet I do not think this is radicular in nature because it is bilateral.  No red flag symptoms, specifically no disturbance of bowel or bladder function, fevers, weakness whatsoever in her lower extremities, or sensory loss.    She reports being more comfortable in standing position, pain tends to be worse sitting.  I told her to stay as mobile as possible, and continue to use her standing desk.  She needs to sleep on a supportive surface.  Her  is a snorer, and perhaps she might ask him to sleep in a different room for the short-term.    I told her she can also apply warm packs, or if she finds ice helps, okay to try ice.  Since not experiencing muscle cramps, I do not think we need to resort to muscle relaxants.    Seropositive rheumatoid arthritis, on a stable regimen of methotrexate, Plaquenil, and sulfasalazine which she reports works well for her.  The RA affects her hands and wrists.  She was diagnosed  around age 48.  Because she is on sulfasalazine, I do not want to stack an NSAID on top of this.    Osteoarthritis involving multiple joints.  She recalls a cortisol shot into right knee in  several years ago.    Overweight, with obese level BMI of 36.7; she knows that she needs to get her weight down which would be good for her back.  Admits to somewhat lax dietary discipline and not enough exercise.  She told me she knows what to do to fix this.    Essential hypertension, decent blood pressure today, on a stable regimen of lisinopril plus hydrochlorothiazide.    Seasonal allergic induced wheezing, for which he uses albuterol inhaler occasionally.

## 2021-06-10 NOTE — TELEPHONE ENCOUNTER
Upcoming Appointment Question  When is the appointment: 08/19/20  What is your appointment for?: Patient could not recall scheduling this appointment, advised patient it is noted as medication check for blood pressure and thyroid  Who is your appointment scheduled with?: PCP only  What is your question/concern?: Patient asking PCP if she should keep office visit for 08/19 or if she can address medication check at her physical 10/01/20?   Okay to leave a detailed message?: Yes

## 2021-06-10 NOTE — TELEPHONE ENCOUNTER
Dr Merari Tolentino is out of inventory for medication.    Please send RX: hydrOXYchloroQUINE (PLAQUENIL) 200 mg tablet     To: [id number] [place], [place] - [address]  AT [facility]

## 2021-06-10 NOTE — PROGRESS NOTES
ASSESSMENT AND PLAN:  Isabela Olivo 51 y.o. female has  seropositive rheumatoid arthritis she is on triple regimen doing great.  Her recent labs are within normal range.  She noted pain in the knees bilaterally.  She's done a great job starting a workout recently, having lost 20 pounds since then.  The Knees have osteoarthritis.  Management principles were reviewed including use of over-the-counter measures, core strengthening and muscle strengthening around the knees, and nonsteroidals to be avoided that she is on methotrexate, local injection suggested corticosteroids in patients who have no relief with the measures noted above.  I would have asked her to return for follow-up in 6 months.      Diagnoses and all orders for this visit:    Seropositive rheumatoid arthritis of multiple sites  -     methotrexate 2.5 MG tablet; TAKE 8 TABLETS BY MOUTH ONCE A WEEK  Dispense: 96 tablet; Refill: 0  -     sulfaSALAzine (AZULFIDINE) 500 mg tablet; Take 2 tablets (1,000 mg total) by mouth 2 (two) times a day.  Dispense: 360 tablet; Refill: 0    Primary osteoarthritis of both knees    Positive anti-CCP test    High risk medication use        HISTORY OF PRESENTING ILLNESS:  Isabela Olivo 51 y.o.  is here for followup of Rheumatoid Arthritis. Joints affected include multiple joints. Her arthropathy presented more than 2 1/2 years ago. Onset was gradual. Her rheumatoid arthritis  symptoms are stable.Symptoms included joint pain, joint swelling and morning stiffness and were of moderate severity.Symptoms are made worse by: nothing. Symptoms are helped by current regimen.  Patient denies associated alopecia, fatigue, nausea, new headache, nodules, oral ulcers, rashes/photosensitive, Raynaud's and seizures.  Overall disease activity:  improved. Limitation on activities as noted in the MDHAQ scanned in the EMR.  Further historical information, including ROS as noted in the multidimensional health assessment questionnaire scanned in the  "EMR and in the assessment and plan section.  She had knee injected here last visit 4 months ago and continues to derive great benefit from that.  Rheumatoid Arthritis Disease Activity Measure used: RAPID3, reviewed  today and scanned in the chart.          ALLERGIES:Dilantin infatabs [phenytoin] and Phenytoin sodium extended    PAST MEDICAL/ACTIVE PROBLEMS/MEDICATION/SOCIAL DATA  No past medical history on file.  History   Smoking Status     Never Smoker   Smokeless Tobacco     Not on file     Patient Active Problem List   Diagnosis     Mild Persistent Asthma     Hypothyroidism     Hypertension     Vitamin D Deficiency     Dyslipidemia     Seropositive rheumatoid arthritis of multiple sites     Knee pain, bilateral     High risk medication use     Positive anti-CCP test     Primary osteoarthritis of both knees     Current Outpatient Prescriptions   Medication Sig Dispense Refill     cholecalciferol, vitamin D3, (CHOLECALCIFEROL) 1,000 unit tablet Take 1,000 Units by mouth daily.       hydroCHLOROthiazide (HYDRODIURIL) 25 MG tablet TAKE 1 TABLET BY MOUTH ONCE DAILY 90 tablet 0     hydroxychloroquine (PLAQUENIL) 200 mg tablet TAKE 1 TABLET TWICE A DAY WITH MEALS 180 tablet 0     levothyroxine (SYNTHROID, LEVOTHROID) 125 MCG tablet TAKE 1 TABLET BY MOUTH DAILY 90 tablet 0     lisinopril (PRINIVIL,ZESTRIL) 20 MG tablet TAKE 1 TABLET BY MOUTH ONCE DAILY 90 tablet 0     methotrexate 2.5 MG tablet TAKE 8 TABLETS BY MOUTH ONCE A WEEK 32 tablet 0     sulfaSALAzine (AZULFIDINE) 500 mg tablet Take 2 tablets (1,000 mg total) by mouth 2 (two) times a day. 120 tablet 0     folic acid (FOLVITE) 1 MG tablet Take 1 tablet (1 mg total) by mouth daily. 30 tablet 11     No current facility-administered medications for this visit.          DETAILED EXAMINATION  04/20/17  :  Vitals:    04/20/17 0915   BP: 120/76   Pulse: 81   SpO2: 98%   Weight: (!) 223 lb (101.2 kg)   Height: 5' 6\" (1.676 m)     Alert oriented. Head including the face " is examined for malar rash, heliotropes, scarring, lupus pernio. Eyes examined for redness such as in episcleritis/scleritis, periorbital lesions.   Neck examined  for lymph nodes, range of motion Both upper and lower extremities (all four) examined for swollen, warm &/or  tender joints, range of motion, rash, muscle weakness, edema. The salient normal / abnormal findings are appended. .  There is no synovitis of the palpable appendicular joints.  No JLT of the knees or warmth or effusion on either side.       LAB / IMAGING DATA:  ALT   Date Value Ref Range Status   04/18/2017 20 0 - 45 U/L Final   03/21/2017 20 0 - 45 U/L Final   02/21/2017 16 0 - 45 U/L Final     Albumin   Date Value Ref Range Status   04/18/2017 3.9 3.5 - 5.0 g/dL Final   03/21/2017 4.3 3.5 - 5.0 g/dL Final   02/21/2017 3.9 3.5 - 5.0 g/dL Final     Creatinine   Date Value Ref Range Status   04/18/2017 0.99 0.60 - 1.10 mg/dL Final   03/21/2017 1.09 0.60 - 1.10 mg/dL Final   02/21/2017 0.92 0.60 - 1.10 mg/dL Final       WBC   Date Value Ref Range Status   04/18/2017 5.9 4.0 - 11.0 thou/uL Final   02/21/2017 5.9 4.0 - 11.0 thou/uL Final   08/04/2015 6.1 4.0 - 11.0 thou/uL Final   06/02/2015 6.4 4.0 - 11.0 thou/uL Final     Hemoglobin   Date Value Ref Range Status   04/18/2017 13.6 12.0 - 16.0 g/dL Final   02/21/2017 13.3 12.0 - 16.0 g/dL Final   12/20/2016 13.0 12.0 - 16.0 g/dL Final     Platelets   Date Value Ref Range Status   04/18/2017 281 140 - 440 thou/uL Final   02/21/2017 291 140 - 440 thou/uL Final   12/20/2016 327 140 - 440 thou/uL Final       Lab Results   Component Value Date    RF 24 02/20/2013    SEDRATE 10 02/20/2013

## 2021-06-10 NOTE — TELEPHONE ENCOUNTER
Patient is waiting for prescription to be mailed from [facility] and is out. Please send a 30 day supply to local pharmacy pended.  Patient has one day supply left.

## 2021-06-10 NOTE — PROGRESS NOTES
"Isabela Olivo is a 55 y.o. female who is being evaluated via a billable video visit.      The patient has been notified of following:     \"This video visit will be conducted via a call between you and your physician/provider. We have found that certain health care needs can be provided without the need for an in-person physical exam.  This service lets us provide the care you need with a video conversation.  If a prescription is necessary we can send it directly to your pharmacy.  If lab work is needed we can place an order for that and you can then stop by our lab to have the test done at a later time.    Video visits are billed at different rates depending on your insurance coverage. Please reach out to your insurance provider with any questions.    If during the course of the call the physician/provider feels a video visit is not appropriate, you will not be charged for this service.\"    Patient has given verbal consent to a Video visit? Yes  How would you like to obtain your AVS? AVS Preference: [employer].  If dropped by the video visit, the video invitation should be sent to: Text to cell phone: [facility]   Will anyone else be joining your video visit? No          Video-Visit Details    Type of service:  Video Visit    Originating Location (pt. Location): Home    Distant Location (provider location):  [place] [facility]     Platform used for Video Visit: [employer]IC[employer]      ASSESSMENT AND PLAN:    Diagnoses and all orders for this visit:    Seropositive rheumatoid arthritis of multiple sites (H)  -     methotrexate 2.5 MG tablet; Take 8 tablets (20 mg total) by mouth once a week.  Dispense: 96 tablet; Refill: 0  -     hydrOXYchloroQUINE (PLAQUENIL) 200 mg tablet; Take 1 tablet (200 mg total) by mouth 2 (two) times a day with meals.  Dispense: 180 tablet; Refill: 0    Primary osteoarthritis involving multiple joints    Positive anti-CCP test    High risk medication use          HISTORY OF PRESENTING ILLNESS:  Isabela Olivo " "55 y.o. is evaluated here via video link.  This is for follow-up.  She has rheumatoid arthritis.  This is seropositive associated with anti-CCP autoantibodies.  She has done well with the current regimen of methotrexate hydroxychloroquine and sulfasalazine she also takes folic acid.  She reports that she has altered her diet.  She has less of \"inflammatory\" diets.  Overall she feels 1 of the best she has done for a long time.  Recent labs are reviewed within acceptable range.  She works from home.  She is exercising more.  She has been more careful with the type of intake and the amount of her diet.  She is very excited that she has dropped her weight by 13 pounds.  She is to stay the course refills provided.  We will meet here in 3 months with labs prior.       ROS enquiry held for fever, ocular symptoms, rash, headache,  GI issues.  Today we also discussed the issues related to the current pandemic, the pros and cons of the current treatment plan, the CDC guidelines such as social distancing washing the hands covering the cough.  ALLERGIES:Dilantin infatabs [phenytoin] and Phenytoin sodium extended    PAST MEDICAL/ACTIVE PROBLEMS/MEDICATION/SOCIAL DATA  No past medical history on file.  Social History     Tobacco Use   Smoking Status Never Smoker   Smokeless Tobacco Never Used     Patient Active Problem List   Diagnosis     Asthma     Hypothyroidism     Hypertension     Vitamin D Deficiency     Dyslipidemia     Seropositive rheumatoid arthritis of multiple sites (H)     High risk medication use     Positive anti-CCP test     Primary osteoarthritis involving multiple joints     Current Outpatient Medications   Medication Sig Dispense Refill     albuterol (PROAIR HFA;PROVENTIL HFA;VENTOLIN HFA) 90 mcg/actuation inhaler Inhale 2 puffs every 6 (six) hours as needed for wheezing. 1 each 0     cholecalciferol, vitamin D3, (CHOLECALCIFEROL) 1,000 unit tablet Take 1,000 Units by mouth daily.       folic acid (FOLVITE) 1 " MG tablet TAKE 1 TABLET DAILY 90 tablet 3     hydroCHLOROthiazide (HYDRODIURIL) 25 MG tablet TAKE 1 TABLET DAILY 90 tablet 0     hydroxychloroquine (PLAQUENIL) 200 mg tablet Take 1 tablet (200 mg total) by mouth 2 (two) times a day with meals. 180 tablet 0     levothyroxine (SYNTHROID, LEVOTHROID) 125 MCG tablet Take 1 tablet (125 mcg total) by mouth daily. 90 tablet 3     lisinopriL (PRINIVIL,ZESTRIL) 20 MG tablet TAKE 1 TABLET DAILY 90 tablet 0     sulfaSALAzine (AZULFIDINE) 500 mg tablet TAKE 2 TABLETS BY MOUTH  TWICE DAILY 360 tablet 0     traZODone (DESYREL) 50 MG tablet Take 1 tablet (50 mg total) by mouth at bedtime. 90 tablet 3     methotrexate 2.5 MG tablet Take 8 tablets (20 mg total) by mouth once a week. 96 tablet 0     No current facility-administered medications for this visit.          EXAMINATION:    Using the audio and video link as best as possible the constitutional, neck, neurologic, psych, skin, both upper extremities areas/organ system were evaluated during this assessment.  Some of the important findings: No swelling of the digits, able to flex the digit into a fist abduction normal at the shoulders.      LAB / IMAGING DATA:  ALT   Date Value Ref Range Status   08/04/2020 25 0 - 45 U/L Final   05/05/2020 21 0 - 45 U/L Final   02/05/2020 27 0 - 45 U/L Final     Albumin   Date Value Ref Range Status   08/04/2020 4.4 3.5 - 5.0 g/dL Final   05/05/2020 4.2 3.5 - 5.0 g/dL Final   02/05/2020 4.2 3.5 - 5.0 g/dL Final     Creatinine   Date Value Ref Range Status   08/04/2020 1.11 (H) 0.60 - 1.10 mg/dL Final   05/05/2020 1.02 0.60 - 1.10 mg/dL Final   02/05/2020 1.02 0.60 - 1.10 mg/dL Final       WBC   Date Value Ref Range Status   08/04/2020 4.3 4.0 - 11.0 thou/uL Final   05/05/2020 4.8 4.0 - 11.0 thou/uL Final   08/04/2015 6.1 4.0 - 11.0 thou/uL Final   06/02/2015 6.4 4.0 - 11.0 thou/uL Final     Hemoglobin   Date Value Ref Range Status   08/04/2020 13.6 12.0 - 16.0 g/dL Final   05/05/2020 13.4 12.0 -  16.0 g/dL Final   02/05/2020 13.4 12.0 - 16.0 g/dL Final     Platelets   Date Value Ref Range Status   08/04/2020 343 140 - 440 thou/uL Final   05/05/2020 291 140 - 440 thou/uL Final   02/05/2020 319 140 - 440 thou/uL Final       Lab Results   Component Value Date    RF 24 02/20/2013    SEDRATE 10 02/20/2013     Duration of the call:  Minutes  Start: 08/06/2020 03:03 pm   Stop: 08/06/2020 03:10 pm

## 2021-06-10 NOTE — TELEPHONE ENCOUNTER
RN cannot approve Refill Request    RN can NOT refill this medication Protocol failed and NO refill given. Last office visit: Visit date not found Last Physical: 8/2/2019 Last MTM visit: Visit date not found Last visit same specialty: 6/19/2020 David Mcgowan MD.  Next visit within 3 mo: Visit date not found  Next physical within 3 mo: Visit date not found      Vane Garcia, [facility] Triage/Med Refill 8/24/2020    Requested Prescriptions   Pending Prescriptions Disp Refills     hydroCHLOROthiazide (HYDRODIURIL) 25 MG tablet 90 tablet 0     Sig: TAKE 1 TABLET DAILY       Diuretics/Combination Diuretics Refill Protocol  Failed - 8/24/2020  2:17 PM        Failed - Serum Potassium in past 12 months      No results found for: LN-POTASSIUM          Failed - Serum Sodium in past 12 months      No results found for: LN-SODIUM          Passed - Visit with PCP or prescribing provider visit in past 12 months     Last office visit with prescriber/PCP: Visit date not found OR same dept: 6/19/2020 David Mcgowan MD OR same specialty: 6/19/2020 David Mcgowan MD  Last physical: 8/2/2019 Last MTM visit: Visit date not found   Next visit within 3 mo: Visit date not found  Next physical within 3 mo: Visit date not found  Prescriber OR PCP: Emily Bustillo MD  Last diagnosis associated with med order: 1. Essential hypertension  - hydroCHLOROthiazide (HYDRODIURIL) 25 MG tablet; TAKE 1 TABLET DAILY  Dispense: 90 tablet; Refill: 0    If protocol passes may refill for 12 months if within 3 months of last provider visit (or a total of 15 months).             Passed - Blood pressure on file in past 12 months     BP Readings from Last 1 Encounters:   06/19/20 130/72             Passed - Serum Creatinine in past 12 months      Creatinine   Date Value Ref Range Status   08/04/2020 1.11 (H) 0.60 - 1.10 mg/dL Final

## 2021-06-11 NOTE — PROGRESS NOTES
Assessment:     Healthy female exam.   All preventive exams are up-to-date.  Fasting labs will be done next week.      The following high BMI interventions were performed this visit: encouragement to exercise and weight monitoring  This note has been dictated using voice recognition software. Any grammatical or context distortions are unintentional and inherent to the software    1. Healthcare maintenance  Gynecologic Cytology (PAP Smear)   2. Menopause  DXA Bone Density Scan   3. Seropositive rheumatoid arthritis of multiple sites (H)  HM2(CBC w/o Differential)    Comprehensive Metabolic Panel    DXA Bone Density Scan   4. Hypothyroidism, unspecified type  Thyroid Stimulating Hormone (TSH)   5. Dyslipidemia  Lipid Profile   6. Hypertension  Urinalysis-UC if Indicated    Comprehensive Metabolic Panel   7. Vitamin D deficiency  Vitamin D, Total (25-Hydroxy)         Patient Instructions   Flu shot.  Come back for fasting labs.  Schedule DXA.        Plan:          Return in about 1 year (around 10/1/2021) for Annual physical.    Subjective:       Chief Complaint   Patient presents with     Annual Exam     yearly        Isabela Olivo is a 55 y.o. female who presents for an annual exam. Chronic medical problems discussed. No acute issues.    Healthy Habits:   Regular Exercise: Yes  Sunscreen Use: Yes  Healthy Diet: Yes  Dental Visits Regularly: Yes  Seat Belt: Yes  Immunization status: up to date and documented.    Health Maintenance   Topic Date Due     ASTHMA ACTION PLAN  1965     Pneumococcal Vaccine: Pediatrics (0 to 5 Years) and At-Risk Patients (6 to 64 Years) (1 of 1 - PPSV23) 07/22/1969     HIV SCREENING  07/22/1978     PAP SMEAR  03/21/2020     INFLUENZA VACCINE RULE BASED (1) 08/01/2020     PREVENTIVE CARE VISIT  08/02/2020     ADVANCE CARE PLANNING  02/26/2021     TD 18+ HE  06/06/2021     MAMMOGRAM  02/17/2022     COLORECTAL CANCER SCREENING  01/10/2024     LIPID  08/02/2024     Pneumococcal Vaccine: 65+  Years (1 of 1 - PPSV23) 07/22/2028     HEPATITIS C SCREENING  Completed     TDAP ADULT ONE TIME DOSE  Completed     ZOSTER VACCINES  Completed     HEPATITIS B VACCINES  Aged Out     Asthma Control Test  Discontinued       Social History     Socioeconomic History     Marital status:      Spouse name: Not on file     Number of children: Not on file     Years of education: Not on file     Highest education level: Not on file   Occupational History     Not on file   Social Needs     Financial resource strain: Not on file     Food insecurity     Worry: Not on file     Inability: Not on file     Transportation needs     Medical: Not on file     Non-medical: Not on file   Tobacco Use     Smoking status: Never Smoker     Smokeless tobacco: Never Used   Substance and Sexual Activity     Alcohol use: No     Drug use: Not on file     Sexual activity: Yes   Lifestyle     Physical activity     Days per week: Not on file     Minutes per session: Not on file     Stress: Not on file   Relationships     Social connections     Talks on phone: Not on file     Gets together: Not on file     Attends [employer] service: Not on file     Active member of club or organization: Not on file     Attends meetings of clubs or organizations: Not on file     Relationship status: Not on file     Intimate partner violence     Fear of current or ex partner: Not on file     Emotionally abused: Not on file     Physically abused: Not on file     Forced sexual activity: Not on file   Other Topics Concern     Not on file   Social History Narrative     Not on file       Family History   Problem Relation Age of Onset     Breast cancer Mother 62     Hyperlipidemia Mother      Hypertension Mother      Diabetes Father      Hyperlipidemia Father      Hypertension Father      Heart disease Maternal Grandfather        Patient Active Problem List   Diagnosis     Asthma     Hypothyroidism     Hypertension     Vitamin D Deficiency     Dyslipidemia      "Seropositive rheumatoid arthritis of multiple sites (H)     High risk medication use     Positive anti-CCP test     Primary osteoarthritis involving multiple joints       Current Outpatient Medications on File Prior to Visit   Medication Sig Dispense Refill     cholecalciferol, vitamin D3, (CHOLECALCIFEROL) 1,000 unit tablet Take 1,000 Units by mouth daily.       folic acid (FOLVITE) 1 MG tablet TAKE 1 TABLET DAILY 90 tablet 3     hydroCHLOROthiazide (HYDRODIURIL) 25 MG tablet TAKE 1 TABLET DAILY 90 tablet 0     hydrOXYchloroQUINE (PLAQUENIL) 200 mg tablet TAKE 1 TABLET BY MOUTH  TWICE DAILY WITH MEALS 180 tablet 0     levothyroxine (SYNTHROID, LEVOTHROID) 125 MCG tablet Take 1 tablet (125 mcg total) by mouth daily. 90 tablet 3     lisinopriL (PRINIVIL,ZESTRIL) 20 MG tablet TAKE 1 TABLET DAILY 90 tablet 0     methotrexate 2.5 MG tablet Take 8 tablets (20 mg total) by mouth once a week. 96 tablet 0     sulfaSALAzine (AZULFIDINE) 500 mg tablet TAKE 2 TABLETS BY MOUTH  TWICE DAILY 360 tablet 0     No current facility-administered medications on file prior to visit.        Review of Systems  A 12 point comprehensive review of systems was negative except as noted in HPI.         Objective:      /76   Pulse 87   Ht 5' 5.75\" (1.67 m)   Wt 206 lb 8 oz (93.7 kg)   BMI 33.58 kg/m      Body mass index is 33.58 kg/m .        Physical Exam:  General Appearance: Alert, cooperative, no distress, appears stated age.  Head: Normocephalic, without obvious abnormality, atraumatic  Eyes: PERRL, conjunctiva/corneas clear, EOM's intact  Ears: Normal TM's and external ear canals, both ears  Nose: Nares normal, septum midline,mucosa normal, no drainage  Throat: Lips, mucosa, and tongue normal; teeth and gums normal  Neck: Supple, symmetrical, trachea midline, no adenopathy;  thyroid: not enlarged, symmetric, no tenderness/mass/nodules; no carotid bruit or JVD  Back: Symmetric, no curvature, ROM normal, no CVA tenderness.  Lungs: " Clear to auscultation bilaterally, respirations unlabored.  Breasts: No breast masses, tenderness, asymmetry, or nipple discharge.  Heart: Regular rate and rhythm, S1 and S2 normal, no murmur, rub, or gallop.  Abdomen: Soft, non-tender, bowel sounds active all four quadrants,  no masses, no organomegaly.  Pelvic:Normal external genitalia, speculum exam done, PAP done, cervix normal, bimanual exam showed no adnexal tenderness.  Extremities: Extremities normal, atraumatic, no cyanosis or edema.  Skin: Skin color, texture, turgor normal, no rashes or lesions.  Lymph nodes: Cervical, supraclavicular, and axillary nodes normal.  Neurologic: No focal neurological findings.

## 2021-06-11 NOTE — PROGRESS NOTES
Preoperative Exam    Scheduled Procedure: Lid-Blepharoplasty  Surgery Date: 9-25-20  Surgery Location: [facility], [place], fax [phone]    Surgeon: Dr.Susan Soria    Assessment/Plan:     1. Preoperative examination  bilat blepharoplasty is planned.    2. Visual disturbance  Right more than left with eyelid drop    3. Seropositive rheumatoid arthritis of multiple sites (H)  Stable on  Methotrexate and sulfasalazine and Plaquenil.    Surgical Procedure Risk: Low (reported cardiac risk generally < 1%)  Have you had prior anesthesia?: No  Have you or any family members had a previous anesthesia reaction:  No  Do you or any family members have a history of a clotting or bleeding disorder?: No  Cardiac Risk Assessment: no increased risk for major cardiac complications    APPROVAL GIVEN to proceed with proposed procedure, without further diagnostic evaluation        Functional Status: Independent  Patient plans to recover at home with family.     Subjective:      Isabela Olivo is a 55 y.o. female who presents for a preoperative consultation.  See above    All other systems reviewed and are negative, other than those listed in the HPI.    Pertinent History  Do you have difficulty breathing or chest pain after walking up a flight of stairs: No  History of obstructive sleep apnea: No  Steroid use in the last 6 months: No  Frequent Aspirin/NSAID use: No  Prior Blood Transfusion: No  Prior Blood Transfusion Reaction: No  If for some reason prior to, during or after the procedure, if it is medically indicated, would you be willing to have a blood transfusion?:  There is no transfusion refusal.    Current Outpatient Medications   Medication Sig Dispense Refill     cholecalciferol, vitamin D3, (CHOLECALCIFEROL) 1,000 unit tablet Take 1,000 Units by mouth daily.       folic acid (FOLVITE) 1 MG tablet TAKE 1 TABLET DAILY 90 tablet 3     hydroCHLOROthiazide (HYDRODIURIL) 25 MG tablet TAKE 1 TABLET DAILY 90 tablet 0      hydrOXYchloroQUINE (PLAQUENIL) 200 mg tablet Take 1 tablet (200 mg total) by mouth 2 (two) times a day with meals. 180 tablet 0     levothyroxine (SYNTHROID, LEVOTHROID) 125 MCG tablet Take 1 tablet (125 mcg total) by mouth daily. 90 tablet 3     lisinopriL (PRINIVIL,ZESTRIL) 20 MG tablet TAKE 1 TABLET DAILY 90 tablet 0     methotrexate 2.5 MG tablet Take 8 tablets (20 mg total) by mouth once a week. 96 tablet 0     sulfaSALAzine (AZULFIDINE) 500 mg tablet TAKE 2 TABLETS BY MOUTH  TWICE DAILY 360 tablet 0     No current facility-administered medications for this visit.         Allergies   Allergen Reactions     Dilantin Infatabs [Phenytoin]      Phenytoin Sodium Extended Hives       Patient Active Problem List   Diagnosis     Asthma     Hypothyroidism     Hypertension     Vitamin D Deficiency     Dyslipidemia     Seropositive rheumatoid arthritis of multiple sites (H)     High risk medication use     Positive anti-CCP test     Primary osteoarthritis involving multiple joints       No past medical history on file.    Past Surgical History:   Procedure Laterality Date     FL THYROID LOBECTOMY,UNILAT      Description: Thyroid Surgery Thyroid Lobectomy Right Lobe;  Proc Date: 02/05/2007;       Social History     Socioeconomic History     Marital status:      Spouse name: Not on file     Number of children: Not on file     Years of education: Not on file     Highest education level: Not on file   Occupational History     Not on file   Social Needs     Financial resource strain: Not on file     Food insecurity     Worry: Not on file     Inability: Not on file     Transportation needs     Medical: Not on file     Non-medical: Not on file   Tobacco Use     Smoking status: Never Smoker     Smokeless tobacco: Never Used   Substance and Sexual Activity     Alcohol use: No     Drug use: Not on file     Sexual activity: Yes   Lifestyle     Physical activity     Days per week: Not on file     Minutes per session: Not  "on file     Stress: Not on file   Relationships     Social connections     Talks on phone: Not on file     Gets together: Not on file     Attends Caodaism service: Not on file     Active member of club or organization: Not on file     Attends meetings of clubs or organizations: Not on file     Relationship status: Not on file     Intimate partner violence     Fear of current or ex partner: Not on file     Emotionally abused: Not on file     Physically abused: Not on file     Forced sexual activity: Not on file   Other Topics Concern     Not on file   Social History Narrative     Not on file             Objective:     Vitals:    09/09/20 1634   BP: 135/84   Pulse: 76   SpO2: 98%   Weight: 210 lb 9.6 oz (95.5 kg)   Height: 5' 6\" (1.676 m)         Physical Exam:  Constitutional:  oriented to person, place, and time, appears well-nourished. No distress.   HENT:   Head: Normocephalic.   Mouth/Throat: Oropharynx is clear and moist.   Eyes: Conjunctivae are normal. Pupils are equal, round, and reactive to light.   Neck: Normal range of motion. Neck supple.   Cardiovascular: Normal rate, regular rhythm and normal heart sounds.    Pulmonary/Chest: Effort normal and breath sounds normal.   Abdominal: Soft. Bowel sounds are normal.   Musculoskeletal: Normal range of motion.   Neurological: alert and oriented to person, place, and time. Skin: Skin is warm.   Psychiatric: normal mood and affect.        Labs:  Component      Latest Ref Rng & Units 8/4/2020   WBC      4.0 - 11.0 thou/uL 4.3   RBC      3.80 - 5.40 mill/uL 4.38   Hemoglobin      12.0 - 16.0 g/dL 13.6   Hematocrit      35.0 - 47.0 % 41.0   MCV      80 - 100 fL 94   MCH      27.0 - 34.0 pg 31.0   MCHC      32.0 - 36.0 g/dL 33.1   RDW      11.0 - 14.5 % 12.2   Platelets      140 - 440 thou/uL 343   MPV      7.0 - 10.0 fL 7.4   Creatinine      0.60 - 1.10 mg/dL 1.11 (H)   GFR MDRD Af Amer      >60 mL/min/1.73m2 >60   GFR MDRD Non Af Amer      >60 mL/min/1.73m2 51 (L) "       Immunization History   Administered Date(s) Administered     DT (pediatric) 07/13/2005     Hep A, historic 11/01/2007, 05/02/2008     INFLUENZA,RECOMBINANT,INJ,PF QUADRIVALENT 18+YRS 11/15/2019     INFLUENZA,SEASONAL QUAD, PF, =/> 6months 10/08/2015     Influenza [id number], 01/25/2010     Influenza, Seasonal, Inj PF IIV3 10/23/2010     Influenza, inj, historic,unspecified 11/01/2007, 10/12/2014     Influenza, seasonal,quad inj 6-35 mos 01/25/2010, 10/28/2013     Influenza,seasonal,quad inj =/> 6months 09/26/2017     Td,adult,historic,unspecified 07/13/2005     Tdap 06/06/2011     ZOSTER, RECOMBINANT, IM 12/09/2019, 03/20/2020           Electronically signed by Emily Bustillo MD 09/09/20 4:28 PM

## 2021-06-12 NOTE — PROGRESS NOTES
Clinic Note    Assessment:     Assessment and Plan:    1. Orthostatic hypotension    -The patient's history makes this seem like classic orthostatic hypotension to me. I reviewed methods she should try to minimize her symptoms (see below), and told her that we would try these first before changing/decreasing her BP medications. Her neuro and cardiac exams was completely normal today.      Patient Instructions   -Flu shot this fall    -Change positions slowly, don't stand up too fast.     -Stay hydrated and drink plenty of water. Have a water bottle and keep it with you to drink throughout the day.     -If you are still having problems. Come back and we can try to change your blood pressure medications.              Subjective:      Isabela Olivo is a 52 y.o. female who is here with dizziness.     She says that on Sunday morning she was at yoga when she was laying down and stood up suddenly and had an acute episode of dizziness. She says that she did not lose consciousness, but became worried when this happened again that same evening. She says that this happened again when she got up from bed when she woke up early in the morning. She says that she has been having infrequent headaches since this episode initially happened.     No chest pain or heart palpitations. No shortness of breath. No swelling in lower extremities.     Patient says that she did not drink very much water over the past weekend and was eating a lot of junk food.     The following portions of the patient's history were reviewed and updated as appropriate: Medications, Problem list, Allergies.     Review of Systems:    Review is negative except for what is mentioned above.     Social Hx:    History   Smoking Status     Never Smoker   Smokeless Tobacco     Not on file         Objective:     Vitals:    09/12/17 0821   BP: 128/68   Pulse: 81   Temp: 97.4  F (36.3  C)   Weight: (!) 229 lb 6.4 oz (104.1 kg)       Exam:    General: No apparent distress.  Calm. Alert and Oriented X3. Pt behavior is appropriate.  Head:Atraumatic. Normocephalic, non-tender to palpation  Neck: Supple. No JVD. Full ROM. No adenopathy  Eyes: PERRL, No discharge. No strabismus. No nystagmus.  Ears: TMs pearly gray with landmarks visible.   Chest/Lungs: Normal chest wall, clear to auscultation, normal respiratory effort and rate.   Heart/Pulses: Regular rate and rhythm, strong and equal radial pulses, no murmurs. Capillary refill <2 seconds. No edema.   Musculoskeletal: No CVA tenderness with palpation. Good ROM with extremities.   Neurologic: Interactive, alert, no focal findings, CNs intact.   Skin: Warm, dry. Normal hair pattern. Free of lesions. Normal skin turgor.       Patient Active Problem List   Diagnosis     Mild Persistent Asthma     Hypothyroidism     Hypertension     Vitamin D Deficiency     Dyslipidemia     Seropositive rheumatoid arthritis of multiple sites     Knee pain, bilateral     High risk medication use     Positive anti-CCP test     Primary osteoarthritis of both knees     Current Outpatient Prescriptions   Medication Sig Dispense Refill     cholecalciferol, vitamin D3, (CHOLECALCIFEROL) 1,000 unit tablet Take 1,000 Units by mouth daily.       folic acid (FOLVITE) 1 MG tablet Take 1 tablet (1 mg total) by mouth daily. 90 tablet 3     hydroCHLOROthiazide (HYDRODIURIL) 25 MG tablet TAKE 1 TABLET BY MOUTH ONCE DAILY 90 tablet 1     hydroxychloroquine (PLAQUENIL) 200 mg tablet TAKE 1 TABLET TWICE A DAY WITH MEALS 180 tablet 0     levothyroxine (SYNTHROID, LEVOTHROID) 125 MCG tablet TAKE 1 TABLET BY MOUTH ONCE DAILY. 90 tablet 1     lisinopril (PRINIVIL,ZESTRIL) 20 MG tablet TAKE 1 TABLET BY MOUTH ONCE DAILY 90 tablet 1     methotrexate 2.5 MG tablet TAKE 8 TABLETS ONCE A WEEK 96 tablet 0     sulfaSALAzine (AZULFIDINE) 500 mg tablet TAKE 2 TABLETS TWICE A  tablet 0     No current facility-administered medications for this visit.          Jenaro Rich (Rob),  CNP    9/12/2017

## 2021-06-12 NOTE — PROGRESS NOTES
"Isabela Olivo is a 55 y.o. female who is being evaluated via a billable video visit.      The patient has been notified of following:     \"This video visit will be conducted via a call between you and your physician/provider. We have found that certain health care needs can be provided without the need for an in-person physical exam.  This service lets us provide the care you need with a video conversation.  If a prescription is necessary we can send it directly to your pharmacy.  If lab work is needed we can place an order for that and you can then stop by our lab to have the test done at a later time.    Video visits are billed at different rates depending on your insurance coverage. Please reach out to your insurance provider with any questions.    If during the course of the call the physician/provider feels a video visit is not appropriate, you will not be charged for this service.\"    Patient has given verbal consent to a Video visit? Yes  How would you like to obtain your AVS? AVS Preference: Constant Contact.  If dropped by the video visit, the video invitation should be sent to: Text to cell phone: [employer] 1ST/ CELL [phone]  Will anyone else be joining your video visit? No          Video-Visit Details    Type of service:  Video Visit    Originating Location (pt. Location): Home    Distant Location (provider location):  [facility]     Platform used for Video Visit: RIDERS      ASSESSMENT AND PLAN:    Diagnoses and all orders for this visit:    Seropositive rheumatoid arthritis of multiple sites (H)  -     methotrexate 2.5 MG tablet; Take 8 tablets (20 mg total) by mouth once a week.  Dispense: 96 tablet; Refill: 0    High risk medication use    Positive anti-CCP test    Primary osteoarthritis involving multiple joints          HISTORY OF PRESENTING ILLNESS:  Isabela Olivo 55 y.o. is evaluated here via video link.  This is for follow-up.  She has rheumatoid arthritis.  This is seropositive.  " Positive for anti-CCP antibodies.  This is gone on for several years.  This affects multiple joints.  The current regimen, triple, methotrexate, hydroxychloroquine, sulfasalazine has managed her symptoms very well she takes folic acid.  She has not had a flareup.  She is lost 30+ pounds of weight during this current quarantine/pandemic time for her.  She is very excited about that and feels overall so much better.   She is to stay the course refills provided.  We will meet here in 3 months with labs prior.     This is follow up visit for    ROS enquiry held for fever, ocular symptoms, rash, headache,  GI issues.  Today we also discussed the issues related to the current pandemic, the pros and cons of the current treatment plan, the CDC guidelines such as social distancing washing the hands covering the cough.  ALLERGIES:Dilantin infatabs [phenytoin] and Phenytoin sodium extended    PAST MEDICAL/ACTIVE PROBLEMS/MEDICATION/SOCIAL DATA  No past medical history on file.  Social History     Tobacco Use   Smoking Status Never Smoker   Smokeless Tobacco Never Used     Patient Active Problem List   Diagnosis     Asthma     Hypothyroidism     Hypertension     Vitamin D Deficiency     Dyslipidemia     Seropositive rheumatoid arthritis of multiple sites (H)     High risk medication use     Positive anti-CCP test     Primary osteoarthritis involving multiple joints     Current Outpatient Medications   Medication Sig Dispense Refill     cholecalciferol, vitamin D3, (CHOLECALCIFEROL) 1,000 unit tablet Take 1,000 Units by mouth daily.       folic acid (FOLVITE) 1 MG tablet TAKE 1 TABLET BY MOUTH  DAILY 90 tablet 3     hydroCHLOROthiazide (HYDRODIURIL) 25 MG tablet TAKE 1 TABLET BY MOUTH  DAILY 90 tablet 3     hydrOXYchloroQUINE (PLAQUENIL) 200 mg tablet TAKE 1 TABLET BY MOUTH  TWICE DAILY WITH MEALS 180 tablet 0     levothyroxine (SYNTHROID, LEVOTHROID) 125 MCG tablet Take 1 tablet (125 mcg total) by mouth daily. 90 tablet 3      lisinopriL (PRINIVIL,ZESTRIL) 20 MG tablet TAKE 1 TABLET BY MOUTH  DAILY 90 tablet 3     sulfaSALAzine (AZULFIDINE) 500 mg tablet TAKE 2 TABLETS BY MOUTH  TWICE DAILY 360 tablet 0     methotrexate 2.5 MG tablet Take 8 tablets (20 mg total) by mouth once a week. 96 tablet 0     No current facility-administered medications for this visit.          EXAMINATION:    Using the audio and video link as best as possible the constitutional, neck, neurologic, psych, skin, both upper extremities areas/organ system were evaluated during this assessment.  Some of the important findings: Alert, oriented, speech fluent.   Able to fully flex the digits, into fists bilaterally, wrist and elbow elbow range of motion appear normal, abduction of the shoulder is normal.      LAB / IMAGING DATA:  ALT   Date Value Ref Range Status   11/03/2020 25 0 - 45 U/L Final   08/04/2020 25 0 - 45 U/L Final   05/05/2020 21 0 - 45 U/L Final     Albumin   Date Value Ref Range Status   11/03/2020 4.6 3.5 - 5.0 g/dL Final   08/04/2020 4.4 3.5 - 5.0 g/dL Final   05/05/2020 4.2 3.5 - 5.0 g/dL Final     Creatinine   Date Value Ref Range Status   11/03/2020 0.96 0.60 - 1.10 mg/dL Final   08/04/2020 1.11 (H) 0.60 - 1.10 mg/dL Final   05/05/2020 1.02 0.60 - 1.10 mg/dL Final       WBC   Date Value Ref Range Status   11/03/2020 5.4 4.0 - 11.0 thou/uL Final   08/04/2020 4.3 4.0 - 11.0 thou/uL Final   08/04/2015 6.1 4.0 - 11.0 thou/uL Final   06/02/2015 6.4 4.0 - 11.0 thou/uL Final     Hemoglobin   Date Value Ref Range Status   11/03/2020 13.9 12.0 - 16.0 g/dL Final   08/04/2020 13.6 12.0 - 16.0 g/dL Final   05/05/2020 13.4 12.0 - 16.0 g/dL Final     Platelets   Date Value Ref Range Status   11/03/2020 346 140 - 440 thou/uL Final   08/04/2020 343 140 - 440 thou/uL Final   05/05/2020 291 140 - 440 thou/uL Final       Lab Results   Component Value Date    RF 24 02/20/2013    SEDRATE 10 02/20/2013     Duration of the call:5  Minutes  Start: 11/05/2020 04:07 pm   Stop:  11/05/2020 04:12 pm

## 2021-06-12 NOTE — TELEPHONE ENCOUNTER
RN cannot approve Refill Request    RN can NOT refill this medication PCP messaged that patient is overdue for Labs. Last office visit: Visit date not found Last Physical: 10/1/2020 Last MTM visit: Visit date not found Last visit same specialty: 6/19/2020 Daivd Mcgowan MD.  Next visit within 3 mo: Visit date not found  Next physical within 3 mo: Visit date not found      Edelmira Gooden, [facility] Triage/Med Refill 10/17/2020    Requested Prescriptions   Pending Prescriptions Disp Refills     hydroCHLOROthiazide (HYDRODIURIL) 25 MG tablet [Pharmacy Med Name: HYDROCHLOROTHIAZIDE  25MG  TAB] 90 tablet 3     Sig: TAKE 1 TABLET BY MOUTH  DAILY       Diuretics/Combination Diuretics Refill Protocol  Failed - 10/14/2020  6:36 AM        Failed - Serum Potassium in past 12 months      No results found for: LN-POTASSIUM          Failed - Serum Sodium in past 12 months      No results found for: LN-SODIUM          Passed - Visit with PCP or prescribing provider visit in past 12 months     Last office visit with prescriber/PCP: Visit date not found OR same dept: 6/19/2020 David Mcgowan MD OR same specialty: 6/19/2020 David Mcgowan MD  Last physical: 10/1/2020 Last MTM visit: Visit date not found   Next visit within 3 mo: Visit date not found  Next physical within 3 mo: Visit date not found  Prescriber OR PCP: Emily Bustillo MD  Last diagnosis associated with med order: 1. Essential hypertension  - hydroCHLOROthiazide (HYDRODIURIL) 25 MG tablet [Pharmacy Med Name: HYDROCHLOROTHIAZIDE  25MG  TAB]; TAKE 1 TABLET BY MOUTH  DAILY  Dispense: 90 tablet; Refill: 3  - lisinopriL (PRINIVIL,ZESTRIL) 20 MG tablet [Pharmacy Med Name: LISINOPRIL  20MG  TAB]; TAKE 1 TABLET BY MOUTH  DAILY  Dispense: 90 tablet; Refill: 3    If protocol passes may refill for 12 months if within 3 months of last provider visit (or a total of 15 months).             Passed - Blood pressure on file in past 12 months     BP Readings from  Last 1 Encounters:   10/01/20 128/76             Passed - Serum Creatinine in past 12 months      Creatinine   Date Value Ref Range Status   08/04/2020 1.11 (H) 0.60 - 1.10 mg/dL Final                lisinopriL (PRINIVIL,ZESTRIL) 20 MG tablet [Pharmacy Med Name: LISINOPRIL  20MG  TAB] 90 tablet 3     Sig: TAKE 1 TABLET BY MOUTH  DAILY       Ace Inhibitors Refill Protocol Failed - 10/14/2020  6:36 AM        Failed - Serum Potassium in past 12 months     No results found for: LN-POTASSIUM          Passed - PCP or prescribing provider visit in past 12 months       Last office visit with prescriber/PCP: Visit date not found OR same dept: 6/19/2020 David Mcgowan MD OR same specialty: 6/19/2020 David Mcgowan MD  Last physical: 10/1/2020 Last MTM visit: Visit date not found   Next visit within 3 mo: Visit date not found  Next physical within 3 mo: Visit date not found  Prescriber OR PCP: Emily Bustillo MD  Last diagnosis associated with med order: 1. Essential hypertension  - hydroCHLOROthiazide (HYDRODIURIL) 25 MG tablet [Pharmacy Med Name: HYDROCHLOROTHIAZIDE  25MG  TAB]; TAKE 1 TABLET BY MOUTH  DAILY  Dispense: 90 tablet; Refill: 3  - lisinopriL (PRINIVIL,ZESTRIL) 20 MG tablet [Pharmacy Med Name: LISINOPRIL  20MG  TAB]; TAKE 1 TABLET BY MOUTH  DAILY  Dispense: 90 tablet; Refill: 3    If protocol passes may refill for 12 months if within 3 months of last provider visit (or a total of 15 months).             Passed - Blood pressure filed in past 12 months     BP Readings from Last 1 Encounters:   10/01/20 128/76             Passed - Serum Creatinine in past 12 months     Creatinine   Date Value Ref Range Status   08/04/2020 1.11 (H) 0.60 - 1.10 mg/dL Final

## 2021-06-13 NOTE — PROGRESS NOTES
Clinic Note    Assessment:     Assessment and Plan:    1. Dizziness    I am not quite sure what to make of her change in symptoms. The patient reports that her dizziness with standing is much improved, however, these brief episodes that she has while laying down seem to be related to her inner ear rather than her blood pressure or a neurological issue. She is having some ringing in her ears lately. In the context of her baseline trouble with sleeping I told her that we could try HS benadryl for her symptoms and then see how she is doing in two weeks.     2. Hypothyroidism    She has not had her TSH checked since changing her synthroid dosage. We will do this today.     - Thyroid Stimulating Hormone (TSH)     Patient Instructions   Try benadryl 30-60 minutes before you go to sleep for your symptoms. Follow instructions on the back of the box.     If your symptoms are not better in two weeks, come back to see me.                Subjective:      Isabela Olivo is a 52 y.o. female who is here for a follow-up for her blood pressure.     At her prior appointment from 09/12 she was having symptoms that were consistent with orthostatic hypotension.  She has been working on drinking more water, and changing positions more slowly which has helped. She says that she is still having very brief episodes of dizziness, but now, this happens while she is laying down and changes position (ie rolls over)     She has no other balance issues at this time. She has never had problems with vertigo before. This happens maybe once or twice per day. She has been having more headaches. She has no symptoms throughout the day, only when she goes to lay down on the couch or to go to bed. Also having high pitch ringing in ears. No chest pain or heart palpitations.               The following portions of the patient's history were reviewed and updated as appropriate: Allergies, medications, Problem List.     Review of Systems:    Review is negative  except for what is mentioned above.     Social Hx:    History   Smoking Status     Never Smoker   Smokeless Tobacco     Not on file         Objective:     Vitals:    09/26/17 1331   BP: 128/68   Pulse: 84   Weight: (!) 228 lb 9.6 oz (103.7 kg)       Exam:    General: No apparent distress. Calm. Alert and Oriented X3. Pt behavior is appropriate.  Head:Atraumatic. Normocephalic, non-tender to palpation  Neck: Supple. No JVD. Full ROM. No adenopathy  Eyes: PERRL, No discharge. No strabismus. No nystagmus.  Ears: TMs pearly gray with landmarks visible. Sly hallpike is negative.   Nose/Mouth/Throat: Patent nares, no oral lesions, pharynx clear and without exudate. Uvula mid-line. Nasal septum mid-line. Clear turbinates.   Lymph: No axillar or cervical adenopathy.   Chest/Lungs: Normal chest wall, clear to auscultation, normal respiratory effort and rate.   Heart/Pulses: Regular rate and rhythm, strong and equal radial pulses, no murmurs. Capillary refill <2 seconds. No edema.   Neurologic: Interactive, alert, no focal findings, CNs intact.   Skin: Warm, dry. Normal hair pattern. Free of lesions. Normal skin turgor.       Patient Active Problem List   Diagnosis     Mild Persistent Asthma     Hypothyroidism     Hypertension     Vitamin D Deficiency     Dyslipidemia     Seropositive rheumatoid arthritis of multiple sites     Knee pain, bilateral     High risk medication use     Positive anti-CCP test     Primary osteoarthritis of both knees     Current Outpatient Prescriptions   Medication Sig Dispense Refill     cholecalciferol, vitamin D3, (CHOLECALCIFEROL) 1,000 unit tablet Take 1,000 Units by mouth daily.       folic acid (FOLVITE) 1 MG tablet Take 1 tablet (1 mg total) by mouth daily. 90 tablet 3     hydroCHLOROthiazide (HYDRODIURIL) 25 MG tablet TAKE 1 TABLET BY MOUTH ONCE DAILY 90 tablet 1     hydroxychloroquine (PLAQUENIL) 200 mg tablet TAKE 1 TABLET TWICE A DAY WITH MEALS 180 tablet 0     levothyroxine (SYNTHROID,  LEVOTHROID) 125 MCG tablet TAKE 1 TABLET BY MOUTH ONCE DAILY. (Patient taking differently: TAKE 1 TABLET BY MOUTH 5 TIMES A WEEK 1/2 TAB TIMES A WEEK) 90 tablet 1     lisinopril (PRINIVIL,ZESTRIL) 20 MG tablet TAKE 1 TABLET BY MOUTH ONCE DAILY 90 tablet 1     methotrexate 2.5 MG tablet TAKE 8 TABLETS ONCE A WEEK 96 tablet 0     sulfaSALAzine (AZULFIDINE) 500 mg tablet TAKE 2 TABLETS TWICE A  tablet 0     No current facility-administered medications for this visit.          Jenaro Rich (Rob), FAVIAN    9/26/2017

## 2021-06-13 NOTE — PROGRESS NOTES
"ASSESSMENT AND PLAN:  Isabela Olivo 52 y.o. female is a for follow-up of seropositive rheumatoid arthritis.  She is doing great with her current triple regimen.  She has tolerated this well.  She is finding it once in 6 months of follow-up quite appropriate for herself.  She did notice however that her knees \", [place], as she goes up the steps but without pain.  She is known to have osteoarthritis of the knees.  She is aware that the best thing she can do is nonpharmacologic including strengthening of the core muscles.  Her recent labs are within acceptable range.  I have asked her to return for follow-up here in 6 months with labs every 3 months.    Diagnoses and all orders for this visit:    Seropositive rheumatoid arthritis of multiple sites  -     hydroxychloroquine (PLAQUENIL) 200 mg tablet; TAKE 1 TABLET TWICE A DAY WITH MEALS  Dispense: 180 tablet; Refill: 0  -     methotrexate 2.5 MG tablet; TAKE 8 TABLETS ONCE A WEEK  Dispense: 96 tablet; Refill: 0  -     sulfaSALAzine (AZULFIDINE) 500 mg tablet; TAKE 2 TABLETS TWICE A DAY  Dispense: 360 tablet; Refill: 0    Primary osteoarthritis of both knees    Positive anti-CCP test    High risk medication use      HISTORY OF PRESENTING ILLNESS:  Isabela Olivo 52 y.o.  is here for followup of Rheumatoid Arthritis.  This is seropositive, anti-CCP antibody positive.  She has done very well with the current regimen.  She noted her pain level at 2.0/10.  Able to do all her day-to-day activities without difficulty.  There is no morning stiffness.  She has noticed creaking type sounds emanating from her knees when she goes up the steps.  This is not the case when she comes down the steps.  There is no associated pain.  Her x-rays in the past have shown early changes of osteoarthritis.  She has not had any fever or weight loss blurry vision eye redness mouth was a nausea cough or rash.  Her recent labs are within normal range.  Her arthropathy presented more than 2 1/2 years ago. " Onset was gradual. Her rheumatoid arthritis  symptoms are stable.Symptoms included joint pain, joint swelling and morning stiffness and were of moderate severity.Symptoms are made worse by: nothing. Symptoms are helped by current regimen.  Patient denies associated alopecia, fatigue, nausea, new headache, nodules, oral ulcers, rashes/photosensitive, Raynaud's and seizures.  Overall disease activity:  improved. Limitation on activities as noted in the MDHAQ scanned in the EMR.  Further historical information, including ROS as noted in the multidimensional health assessment questionnaire scanned in the EMR and in the assessment and plan section.  She had knee injected here last visit 10  months ago and continues to derive great benefit from that.  Rheumatoid Arthritis Disease Activity Measure used: RAPID3, reviewed  today and scanned in the chart.    ALLERGIES:Dilantin infatabs [phenytoin] and Phenytoin sodium extended    PAST MEDICAL/ACTIVE PROBLEMS/MEDICATION/SOCIAL DATA  No past medical history on file.  History   Smoking Status     Never Smoker   Smokeless Tobacco     Not on file     Patient Active Problem List   Diagnosis     Mild Persistent Asthma     Hypothyroidism     Hypertension     Vitamin D Deficiency     Dyslipidemia     Seropositive rheumatoid arthritis of multiple sites     Knee pain, bilateral     High risk medication use     Positive anti-CCP test     Primary osteoarthritis of both knees     Current Outpatient Prescriptions   Medication Sig Dispense Refill     cholecalciferol, vitamin D3, (CHOLECALCIFEROL) 1,000 unit tablet Take 1,000 Units by mouth daily.       folic acid (FOLVITE) 1 MG tablet Take 1 tablet (1 mg total) by mouth daily. 90 tablet 3     hydroCHLOROthiazide (HYDRODIURIL) 25 MG tablet TAKE 1 TABLET BY MOUTH ONCE DAILY 90 tablet 1     hydroxychloroquine (PLAQUENIL) 200 mg tablet TAKE 1 TABLET TWICE A DAY WITH MEALS 180 tablet 0     levothyroxine (SYNTHROID, LEVOTHROID) 125 MCG tablet TAKE  "1 TABLET BY MOUTH ONCE DAILY. (Patient taking differently: TAKE 1 TABLET BY MOUTH 5 TIMES A WEEK 1/2 TAB TIMES A WEEK) 90 tablet 1     lisinopril (PRINIVIL,ZESTRIL) 20 MG tablet TAKE 1 TABLET BY MOUTH ONCE DAILY 90 tablet 1     methotrexate 2.5 MG tablet TAKE 8 TABLETS ONCE A WEEK 96 tablet 0     sulfaSALAzine (AZULFIDINE) 500 mg tablet TAKE 2 TABLETS TWICE A  tablet 0     No current facility-administered medications for this visit.          DETAILED EXAMINATION  10/19/17  :  Vitals:    10/19/17 1057   BP: 130/80   Pulse: 73   Weight: (!) 229 lb (103.9 kg)   Height: 5' 6\" (1.676 m)     Alert oriented. Head including the face is examined for malar rash, heliotropes, scarring, lupus pernio. Eyes examined for redness such as in episcleritis/scleritis, periorbital lesions.   Neck examined  for range of motion Both upper and lower extremities (all four) examined for swollen, warm &/or  tender joints, range of motion, rash, muscle weakness, edema. The salient normal / abnormal findings are appended. .  There is no synovitis of the palpable appendicular joints.  No JLT of the knees or warmth or effusion on either side.       LAB / IMAGING DATA:  ALT   Date Value Ref Range Status   10/17/2017 19 0 - 45 U/L Final   07/21/2017 20 0 - 45 U/L Final   04/18/2017 20 0 - 45 U/L Final     Albumin   Date Value Ref Range Status   10/17/2017 3.9 3.5 - 5.0 g/dL Final   07/21/2017 4.0 3.5 - 5.0 g/dL Final   04/18/2017 3.9 3.5 - 5.0 g/dL Final     Creatinine   Date Value Ref Range Status   10/17/2017 1.09 0.60 - 1.10 mg/dL Final   07/21/2017 1.04 0.60 - 1.10 mg/dL Final   04/18/2017 0.99 0.60 - 1.10 mg/dL Final       WBC   Date Value Ref Range Status   10/17/2017 5.3 4.0 - 11.0 thou/uL Final   07/21/2017 7.0 4.0 - 11.0 thou/uL Final   08/04/2015 6.1 4.0 - 11.0 thou/uL Final   06/02/2015 6.4 4.0 - 11.0 thou/uL Final     Hemoglobin   Date Value Ref Range Status   10/17/2017 13.5 12.0 - 16.0 g/dL Final   07/21/2017 13.0 12.0 - 16.0 " g/dL Final   04/18/2017 13.6 12.0 - 16.0 g/dL Final     Platelets   Date Value Ref Range Status   10/17/2017 320 140 - 440 thou/uL Final   07/21/2017 310 140 - 440 thou/uL Final   04/18/2017 281 140 - 440 thou/uL Final       Lab Results   Component Value Date    RF 24 02/20/2013    SEDRATE 10 02/20/2013

## 2021-06-15 ENCOUNTER — RECORDS - HEALTHEAST (OUTPATIENT)
Dept: ADMINISTRATIVE | Facility: OTHER | Age: 56
End: 2021-06-15

## 2021-06-15 ENCOUNTER — RECORDS - HEALTHEAST (OUTPATIENT)
Dept: RHEUMATOLOGY | Facility: CLINIC | Age: 56
End: 2021-06-15

## 2021-06-15 DIAGNOSIS — M05.79 SEROPOSITIVE RHEUMATOID ARTHRITIS OF MULTIPLE SITES (H): ICD-10-CM

## 2021-06-15 RX ORDER — SULFASALAZINE 500 MG/1
1000 TABLET ORAL 2 TIMES DAILY
Qty: 360 TABLET | Refills: 0 | Status: SHIPPED | OUTPATIENT
Start: 2021-06-15 | End: 2021-08-05

## 2021-06-15 NOTE — PROGRESS NOTES
"Isabela Olivo is a 55 y.o. female who is being evaluated via a billable video visit.      How would you like to obtain your AVS? MyChart.  If dropped from the video visit, the video invitation should be resent by: [phone]  Will anyone else be joining your video visit? No      Video Start Time:   Start: 02/08/2021 04:36 pm   Stop: 02/08/2021 04:41 pm    Video-Visit Details    Type of service:  Video Visit    Video End Time (time video stopped): 4:43 PM  Originating Location (pt. Location): Home    Distant Location (provider location):  [email or web address]     Platform used for Video Visit: [email or web address]    This document was created using a software with less than 100% fidelity, at times resulting in unintended, even erroneous syntax and grammar.  The reader is advised to keep this under consideration while reviewing, interpreting this note.           ASSESSMENT AND PLAN:    Diagnoses and all orders for this visit:    Seropositive rheumatoid arthritis of multiple sites (H)  -     methotrexate 2.5 MG tablet; Take 8 tablets (20 mg total) by mouth once a week.  Dispense: 96 tablet; Refill: 0    Stage 3a chronic kidney disease    Primary osteoarthritis involving multiple joints    High risk medication use    Positive anti-CCP test          HISTORY OF PRESENTING ILLNESS:  Isabela Olivo 55 y.o. is evaluated here via video link.  This patient is here for follow-up.  She has rheumatoid arthritis.  She has osteoarthritis.  She is doing great with the current regimen.  She noted \"a bump\" on her right index finger DIP pain, she elevated it during exertion here, and turns out that this is \"firm bony type not a \"squishy type swelling.  This does not hurt.  She is able to do all her day-to-day activities.  Recent labs are reviewed within acceptable range.  I have asked her to stay the course.  Follow-up in 6 months labs every 3 months.  Management principles of OA discussed.   ROS enquiry held for fever, ocular symptoms, " rash, headache,  GI issues.  Today we also discussed the issues related to the current pandemic, the pros and cons of the current treatment plan, the CDC guidelines such as social distancing washing the hands covering the cough.  ALLERGIES:Dilantin infatabs [phenytoin] and Phenytoin sodium extended    PAST MEDICAL/ACTIVE PROBLEMS/MEDICATION/SOCIAL DATA  No past medical history on file.  Social History     Tobacco Use   Smoking Status Never Smoker   Smokeless Tobacco Never Used     Patient Active Problem List   Diagnosis     Asthma     Hypothyroidism     Hypertension     Vitamin D Deficiency     Dyslipidemia     Seropositive rheumatoid arthritis of multiple sites (H)     High risk medication use     Positive anti-CCP test     Primary osteoarthritis involving multiple joints     Current Outpatient Medications   Medication Sig Dispense Refill     cholecalciferol, vitamin D3, (CHOLECALCIFEROL) 1,000 unit tablet Take 1,000 Units by mouth daily.       folic acid (FOLVITE) 1 MG tablet TAKE 1 TABLET BY MOUTH  DAILY 90 tablet 3     hydroCHLOROthiazide (HYDRODIURIL) 25 MG tablet TAKE 1 TABLET BY MOUTH  DAILY 90 tablet 3     hydrOXYchloroQUINE (PLAQUENIL) 200 mg tablet TAKE 1 TABLET BY MOUTH  TWICE DAILY WITH MEALS 180 tablet 0     levothyroxine (SYNTHROID, LEVOTHROID) 125 MCG tablet Take 1 tablet (125 mcg total) by mouth daily. 90 tablet 3     lisinopriL (PRINIVIL,ZESTRIL) 20 MG tablet TAKE 1 TABLET BY MOUTH  DAILY 90 tablet 3     sulfaSALAzine (AZULFIDINE) 500 mg tablet TAKE 2 TABLETS BY MOUTH  TWICE DAILY 360 tablet 0     methotrexate 2.5 MG tablet Take 8 tablets (20 mg total) by mouth once a week. 96 tablet 0     No current facility-administered medications for this visit.          EXAMINATION:    Using the audio and video link as best as possible the constitutional, neck, neurologic, psych, skin, both upper extremities areas/organ system were evaluated during this assessment.  Some of the important findings: Alert,  oriented, speech fluent.    She appears to have [place] node at the index finger DIP.  Able to fully flex the digits, into fists bilaterally, wrist and elbow elbow range of motion appear normal, abduction of the shoulder is normal.      LAB / IMAGING DATA:  ALT   Date Value Ref Range Status   02/02/2021 23 0 - 45 U/L Final   11/03/2020 25 0 - 45 U/L Final   08/04/2020 25 0 - 45 U/L Final     Albumin   Date Value Ref Range Status   02/02/2021 4.2 3.5 - 5.0 g/dL Final   11/03/2020 4.6 3.5 - 5.0 g/dL Final   08/04/2020 4.4 3.5 - 5.0 g/dL Final     Creatinine   Date Value Ref Range Status   02/02/2021 0.99 0.60 - 1.10 mg/dL Final   11/03/2020 0.96 0.60 - 1.10 mg/dL Final   08/04/2020 1.11 (H) 0.60 - 1.10 mg/dL Final       WBC   Date Value Ref Range Status   02/02/2021 5.2 4.0 - 11.0 thou/uL Final   11/03/2020 5.4 4.0 - 11.0 thou/uL Final   08/04/2015 6.1 4.0 - 11.0 thou/uL Final   06/02/2015 6.4 4.0 - 11.0 thou/uL Final     Hemoglobin   Date Value Ref Range Status   02/02/2021 12.7 12.0 - 16.0 g/dL Final   11/03/2020 13.9 12.0 - 16.0 g/dL Final   08/04/2020 13.6 12.0 - 16.0 g/dL Final     Platelets   Date Value Ref Range Status   02/02/2021 274 140 - 440 thou/uL Final   11/03/2020 346 140 - 440 thou/uL Final   08/04/2020 343 140 - 440 thou/uL Final       Lab Results   Component Value Date    RF 24 02/20/2013    SEDRATE 10 02/20/2013

## 2021-06-16 PROBLEM — N18.30 CHRONIC KIDNEY DISEASE, STAGE 3 (H): Status: ACTIVE | Noted: 2021-02-08

## 2021-06-16 PROBLEM — M15.0 PRIMARY OSTEOARTHRITIS INVOLVING MULTIPLE JOINTS: Status: ACTIVE | Noted: 2018-11-01

## 2021-06-17 NOTE — TELEPHONE ENCOUNTER
traZODone (DESYREL) 50 MG tablet [[id number]]    Electronically signed by: Eimly Bustillo MD on 08/02/19 1627 Status: Discontinued   Ordering user: Emily Bustillo MD 08/02/19 1627 Authorized by: Emily Bustillo MD   Frequency: [facility] 08/02/19 - 09/09/20  Released by: Emily Bustillo MD 08/02/19 1627   Discontinued by: Emily Bustillo MD 09/09/20 1647 [Therapy completed]   Diagnoses   Insomnia, unspecified type [G47.00]

## 2021-06-17 NOTE — TELEPHONE ENCOUNTER
Methotrexate refill resent to [facility] pharmacy per pt request per RN refill protocol

## 2021-06-17 NOTE — TELEPHONE ENCOUNTER
Refill Approved    Rx renewed per Medication Renewal Policy. Medication was last renewed on 3/25/20.    Doni Lo, [facility] Connection Triage/Med Refill 5/18/2021     Requested Prescriptions   Pending Prescriptions Disp Refills     levothyroxine (SYNTHROID, LEVOTHROID) 125 MCG tablet [Pharmacy Med Name: LEVOTHYROXINE  125MCG  TAB] 90 tablet 3     Sig: TAKE 1 TABLET BY MOUTH  DAILY       Thyroid Hormones Protocol Passed - 5/16/2021  6:53 PM        Passed - Provider visit in past 12 months or next 3 months     Last office visit with prescriber/PCP: Visit date not found OR same dept: 6/19/2020 David Mcgowan MD OR same specialty: 6/19/2020 David Mcgowan MD  Last physical: 10/1/2020 Last MTM visit: Visit date not found   Next visit within 3 mo: Visit date not found  Next physical within 3 mo: Visit date not found  Prescriber OR PCP: Emily Bustillo MD  Last diagnosis associated with med order: 1. Hypothyroidism  - levothyroxine (SYNTHROID, LEVOTHROID) 125 MCG tablet [Pharmacy Med Name: LEVOTHYROXINE  125MCG  TAB]; TAKE 1 TABLET BY MOUTH  DAILY  Dispense: 90 tablet; Refill: 3    If protocol passes may refill for 12 months if within 3 months of last provider visit (or a total of 15 months).             Passed - TSH on file in past 12 months for patient age 12 & older     TSH   Date Value Ref Range Status   11/03/2020 1.73 0.30 - 5.00 uIU/mL Final                      traZODone (DESYREL) 50 MG tablet [Pharmacy Med Name: TRAZODONE  50MG  TAB] 0 tablet 3     Sig: TAKE 1 TABLET AT BEDTIME       Tricyclics/Misc Antidepressant/Antianxiety Meds Refill Protocol Passed - 5/16/2021  6:53 PM        Passed - PCP or prescribing provider visit in last year     Last office visit with prescriber/PCP: Visit date not found OR same dept: 6/19/2020 David Mcgowan MD OR same specialty: 6/19/2020 David Mcgowan MD  Last physical: 10/1/2020 Last MTM visit: Visit date not found   Next visit within 3 mo: Visit date not found   Next physical within 3 mo: Visit date not found  Prescriber OR PCP: Emily Bustillo MD  Last diagnosis associated with med order: 1. Hypothyroidism  - levothyroxine (SYNTHROID, LEVOTHROID) 125 MCG tablet [Pharmacy Med Name: LEVOTHYROXINE  125MCG  TAB]; TAKE 1 TABLET BY MOUTH  DAILY  Dispense: 90 tablet; Refill: 3    If protocol passes may refill for 12 months if within 3 months of last provider visit (or a total of 15 months).

## 2021-06-17 NOTE — PROGRESS NOTES
Assessment:     Healthy female exam.   All preventive exams are up-to-date.  Fasting labs will be done next week.  Insomnia - she cannot tolerate antihistaminics that she tried in the past.  At some point in the past try gabapentin and that worked well and I refilled that prescription for her.      This note has been dictated using voice recognition software. Any grammatical or context distortions are unintentional and inherent to the software    1. Hypothyroidism  Thyroid Stimulating Hormone (TSH)   2. Hypertension  Comprehensive Metabolic Panel   3. Vitamin D deficiency  Vitamin D, Total (25-Hydroxy)   4. Dyslipidemia  Lipid Profile   5. Health care maintenance  HM2(CBC w/o Differential)    Urinalysis   6. Seropositive rheumatoid arthritis of multiple sites     7. Insomnia           There are no Patient Instructions on file for this visit.    Plan:          Return in about 1 year (around 5/9/2019) for Annual physical.    Subjective:       Chief Complaint   Patient presents with     Annual Exam     mammo-2/5/18, colon-1/10/14 q10, dexa-3/28/17 q2, pap-3/21/17. not fasting     Insomnia     Anxiety       Isabela Olivo is a 52 y.o. female who presents for an annual exam.   We reviewed all chronic medical problems.  She is seeing a rheumatologist on a regular basis.    Patient lost her job a few months ago and she is very stressed about that now.  Is very difficult to find a new job after working in the same company for 30 years.  She has trouble now sleeping, falling asleep is a big issue.  She feels tired during the day and having frequent naps during the day because poor sleep during the night.  She would like to try some medication.    Healthy Habits:   Regular Exercise: Yes  Sunscreen Use: Yes  Healthy Diet: Yes  Dental Visits Regularly: Yes  Seat Belt: Yes  Immunization status: up to date and documented.    Health Maintenance   Topic Date Due     MAMMOGRAM  02/09/2019     ADVANCE DIRECTIVES DISCUSSED WITH PATIENT   02/26/2021     TD 18+ HE  06/06/2021     PAP SMEAR  03/21/2022     COLONOSCOPY  01/10/2024     INFLUENZA VACCINE RULE BASED  Completed     TDAP ADULT ONE TIME DOSE  Completed     ASTHMA CONTROL TEST  Excluded     ASTHMA FOLLOW-UP  Excluded       Social History     Social History     Marital status:      Spouse name: N/A     Number of children: N/A     Years of education: N/A     Occupational History     Not on file.     Social History Main Topics     Smoking status: Never Smoker     Smokeless tobacco: Never Used     Alcohol use No     Drug use: Not on file     Sexual activity: Yes     Other Topics Concern     Not on file     Social History Narrative       Family History   Problem Relation Age of Onset     Breast cancer Mother 62     Hyperlipidemia Mother      Hypertension Mother      Diabetes Father      Hyperlipidemia Father      Hypertension Father      Heart disease Maternal Grandfather        Patient Active Problem List   Diagnosis     Mild Persistent Asthma     Hypothyroidism     Hypertension     Vitamin D Deficiency     Dyslipidemia     Seropositive rheumatoid arthritis of multiple sites     High risk medication use     Positive anti-CCP test     Primary osteoarthritis of both knees       Current Outpatient Prescriptions on File Prior to Visit   Medication Sig Dispense Refill     cholecalciferol, vitamin D3, (CHOLECALCIFEROL) 1,000 unit tablet Take 1,000 Units by mouth daily.       hydroCHLOROthiazide (HYDRODIURIL) 25 MG tablet Take 1 tablet (25 mg total) by mouth daily. 90 tablet 0     hydroxychloroquine (PLAQUENIL) 200 mg tablet TAKE 1 TABLET TWICE A DAY WITH MEALS 180 tablet 0     levothyroxine (SYNTHROID, LEVOTHROID) 125 MCG tablet TAKE 1 TABLET DAILY 90 tablet 0     lisinopril (PRINIVIL,ZESTRIL) 20 MG tablet Take 1 tablet (20 mg total) by mouth daily. 90 tablet 0     methotrexate 2.5 MG tablet TAKE 8 TABLETS ONCE A WEEK 96 tablet 0     sulfaSALAzine (AZULFIDINE) 500 mg tablet TAKE 2 TABLETS TWICE A   tablet 0     folic acid (FOLVITE) 1 MG tablet Take 1 tablet (1 mg total) by mouth daily. 90 tablet 3     [DISCONTINUED] hydroxychloroquine (PLAQUENIL) 200 mg tablet Take 1 tablet (200 mg total) by mouth 2 (two) times a day with meals. (Patient not taking: Reported on 5/9/2018) 180 tablet 0     No current facility-administered medications on file prior to visit.        Review of Systems  A 12 point comprehensive review of systems was negative except as noted in HPI.         Objective:      /70  Pulse 64  Wt (!) 234 lb 14.4 oz (106.5 kg)  BMI 37.91 kg/m2    Body mass index is 37.91 kg/(m^2).        Physical Exam:  General Appearance: Alert, cooperative, no distress, appears stated age.  Head: Normocephalic, without obvious abnormality, atraumatic  Eyes: PERRL, conjunctiva/corneas clear, EOM's intact  Ears: Normal TM's and external ear canals, both ears  Nose: Nares normal, septum midline,mucosa normal, no drainage  Throat: Lips, mucosa, and tongue normal; teeth and gums normal  Neck: Supple, symmetrical, trachea midline, no adenopathy;  thyroid: not enlarged, symmetric, no tenderness/mass/nodules; no carotid bruit or JVD  Back: Symmetric, no curvature, ROM normal, no CVA tenderness.  Lungs: Clear to auscultation bilaterally, respirations unlabored.  Breasts: No breast masses, tenderness, asymmetry, or nipple discharge.  Heart: Regular rate and rhythm, S1 and S2 normal, no murmur, rub, or gallop.  Abdomen: Soft, non-tender, bowel sounds active all four quadrants,  no masses, no organomegaly.  Pelvic:declined  Extremities: Extremities normal, atraumatic, no cyanosis or edema.  Skin: Skin color, texture, turgor normal, no rashes or lesions.  Lymph nodes: Cervical, supraclavicular, and axillary nodes normal.  Neurologic: No focal neurological findings.

## 2021-06-17 NOTE — TELEPHONE ENCOUNTER
Telephone Encounter by Sarah Jung at 5/11/2020 12:30 PM     Author: Sarah Jung Service: -- Author Type: --    Filed: 5/11/2020 12:31 PM Encounter Date: 5/8/2020 Status: Signed    : Sarah Jung APPROVED:    Approval start date: 05/08/2020  Approval end date:  05/08/2021    Pharmacy has been notified of approval and will contact patient when medication is ready for pickup.

## 2021-06-17 NOTE — PROGRESS NOTES
ASSESSMENT AND PLAN:  Isabela Olivo 52 y.o. female is a for follow-up.  She has severe seropositive rheumatoid arthritis, osteoarthritis.  She is doing great with a triple regimen of hydroxychloroquine and methotrexate and sulfasalazine.  She has tolerated this well, her labs are in good order.  Her joint symptoms are minimal.  She is going to continue as now.  She is aware of the management principles of osteoarthritis.  Typically she would have her eyes examined may every year and is due to have that done shortly..  As well as she is doing will continue the six-month follow-up and labs every 3 months regimen.        Diagnoses and all orders for this visit:    Seropositive rheumatoid arthritis of multiple sites  -     hydroxychloroquine (PLAQUENIL) 200 mg tablet; Take 1 tablet (200 mg total) by mouth 2 (two) times a day with meals.  Dispense: 180 tablet; Refill: 0    High risk medication use    Positive anti-CCP test    Primary osteoarthritis of both knees        HISTORY OF PRESENTING ILLNESS:  Isabela Olivo 52 y.o.  is here for followup of Rheumatoid Arthritis.  This is seropositive, anti-CCP antibody positive.  She has done very well with the current regimen.  She has noted discomfort in her wrist off and on this is noted to be mild.  This typically is not first thing in the morning.  She has tolerated her current treatment very well.  Recent labs reviewed.  She is able do all her day-to-day activities without difficulty.  She noted no stiffness in the morning.  She noted her pain level at 2.0/10.  Able to do all her day-to-day activities without difficulty.  There is no morning stiffness.  She has noticed creaking type sounds emanating from her knees when she goes up the steps.  This is not the case when she comes down the steps.  There is no associated pain.  Her x-rays in the past have shown early changes of osteoarthritis.  She has not had any fever or weight loss blurry vision eye redness mouth was a nausea  cough or rash.  Her recent labs are within normal range.  Her arthropathy presented more than 2 1/2 years ago. Onset was gradual. Her rheumatoid arthritis  symptoms are stable.Symptoms included joint pain, joint swelling and morning stiffness and were of moderate severity.Symptoms are made worse by: nothing. Symptoms are helped by current regimen.  Patient denies associated alopecia, fatigue, nausea, new headache, nodules, oral ulcers, rashes/photosensitive, Raynaud's and seizures.  Overall disease activity:  improved. Limitation on activities as noted in the MDHAQ scanned in the EMR.  Further historical information, including ROS as noted in the multidimensional health assessment questionnaire scanned in the EMR and in the assessment and plan section.  She had knee injected here last visit 10  months ago and continues to derive great benefit from that.  Rheumatoid Arthritis Disease Activity Measure used: RAPID3, reviewed  today and scanned in the chart.    ALLERGIES:Dilantin infatabs [phenytoin] and Phenytoin sodium extended    PAST MEDICAL/ACTIVE PROBLEMS/MEDICATION/SOCIAL DATA  No past medical history on file.  History   Smoking Status     Never Smoker   Smokeless Tobacco     Never Used     Patient Active Problem List   Diagnosis     Mild Persistent Asthma     Hypothyroidism     Hypertension     Vitamin D Deficiency     Dyslipidemia     Seropositive rheumatoid arthritis of multiple sites     Knee pain, bilateral     High risk medication use     Positive anti-CCP test     Primary osteoarthritis of both knees     Current Outpatient Prescriptions   Medication Sig Dispense Refill     cholecalciferol, vitamin D3, (CHOLECALCIFEROL) 1,000 unit tablet Take 1,000 Units by mouth daily.       hydroCHLOROthiazide (HYDRODIURIL) 25 MG tablet Take 1 tablet (25 mg total) by mouth daily. 90 tablet 0     hydroxychloroquine (PLAQUENIL) 200 mg tablet TAKE 1 TABLET TWICE A DAY WITH MEALS 180 tablet 0     levothyroxine (SYNTHROID,  LEVOTHROID) 125 MCG tablet TAKE 1 TABLET BY MOUTH ONCE DAILY. (Patient taking differently: TAKE 1 TABLET BY MOUTH 5 TIMES A WEEK 1/2 TAB TIMES A WEEK) 90 tablet 1     lisinopril (PRINIVIL,ZESTRIL) 20 MG tablet Take 1 tablet (20 mg total) by mouth daily. 90 tablet 0     methotrexate 2.5 MG tablet TAKE 8 TABLETS ONCE A WEEK 96 tablet 0     sulfaSALAzine (AZULFIDINE) 500 mg tablet TAKE 2 TABLETS TWICE A  tablet 0     folic acid (FOLVITE) 1 MG tablet Take 1 tablet (1 mg total) by mouth daily. 90 tablet 3     No current facility-administered medications for this visit.        DETAILED EXAMINATION  05/01/18  :  Vitals:    05/01/18 0803   BP: 100/70   Patient Site: Right Arm   Patient Position: Sitting   Cuff Size: Adult Regular   Pulse: 76   Weight: (!) 232 lb (105.2 kg)     Alert oriented. Head including the face is examined for malar rash, heliotropes, scarring, lupus pernio. Eyes examined for redness such as in episcleritis/scleritis, periorbital lesions.   Neck/ Face examined for parotid gland swelling, range of motion of neck.  Left upper and lower and right upper and lower extremities examined for tenderness, swelling, warmth of the appendicular joints, range of motion, edema, rash.  Some of the important findings included: She does not have synovitis in any of the palpable appendical joints of the upper extremities.  There is no JLT of the knees or with her either side.              LAB / IMAGING DATA:  ALT   Date Value Ref Range Status   04/17/2018 18 0 - 45 U/L Final   01/30/2018 29 0 - 45 U/L Final   10/17/2017 19 0 - 45 U/L Final     Albumin   Date Value Ref Range Status   04/17/2018 4.0 3.5 - 5.0 g/dL Final   01/30/2018 3.8 3.5 - 5.0 g/dL Final   10/17/2017 3.9 3.5 - 5.0 g/dL Final     Creatinine   Date Value Ref Range Status   04/17/2018 0.99 0.60 - 1.10 mg/dL Final   01/30/2018 1.00 0.60 - 1.10 mg/dL Final   10/17/2017 1.09 0.60 - 1.10 mg/dL Final       WBC   Date Value Ref Range Status   04/17/2018  5.6 4.0 - 11.0 thou/uL Final   01/30/2018 6.3 4.0 - 11.0 thou/uL Final   08/04/2015 6.1 4.0 - 11.0 thou/uL Final   06/02/2015 6.4 4.0 - 11.0 thou/uL Final     Hemoglobin   Date Value Ref Range Status   04/17/2018 13.4 12.0 - 16.0 g/dL Final   01/30/2018 12.5 12.0 - 16.0 g/dL Final   10/17/2017 13.5 12.0 - 16.0 g/dL Final     Platelets   Date Value Ref Range Status   04/17/2018 316 140 - 440 thou/uL Final   01/30/2018 290 140 - 440 thou/uL Final   10/17/2017 320 140 - 440 thou/uL Final       Lab Results   Component Value Date    RF 24 02/20/2013    SEDRATE 10 02/20/2013

## 2021-06-21 NOTE — PROGRESS NOTES
ASSESSMENT AND PLAN:  Isabela Olivo 53 y.o. female is triple regimen.  She has noted more pain in her feet.  This is likely due to osteoarthritis.  This is discussed with her.  Management principles were outlined.  Her recent labs are within acceptable range.  She is to continue as now.  Reminded her of eye examination.  Follow-up in 6 months with labs to 3 months..      Diagnoses and all orders for this visit:    Seropositive rheumatoid arthritis of multiple sites (H)  -     methotrexate 2.5 MG tablet; TAKE 8 TABLETS ONCE A WEEK  Dispense: 96 tablet; Refill: 0  -     hydroxychloroquine (PLAQUENIL) 200 mg tablet; Take 1 tablet (200 mg total) by mouth 2 (two) times a day with meals.  Dispense: 180 tablet; Refill: 0    High risk medication use    Positive anti-CCP test    Primary osteoarthritis involving multiple joints        HISTORY OF PRESENTING ILLNESS:  Isabela Olivo 53 y.o.  is here for followup of Rheumatoid Arthritis.  This is seropositive, anti-CCP antibody positive.  She has done very well with the current regimen as far as her RA is concerned.  However over the past few weeks she has noted intermittent pain this is in her feet.  She noted the medial aspect of moderate worse on the left side.  Worse toward the end of the day.  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  She has noted some improvement already.  She has not seen swelling.  She also noted pain in her left medial upper condyle which has beginning to subside.  She noted that these pains would constitute moderate severity, 5.0/10.  There is been no pain swelling stiffness in the small joints of the hands or wrists the shoulders the hip the knees.  She has mild discomfort in her left knee where she had corticosteroid injection in the past and continues to do well.  There is no sustained stiffness in the morning there is no fever weight loss blurry vision eye redness nausea cough or rash.   She notified today how she is managing her multiple medications across the day and is doing well there.    Her arthropathy presented more than 2 1/2 years ago. Onset was gradual. Her rheumatoid arthritis  symptoms are stable.Symptoms included joint pain, joint swelling and morning stiffness and were of moderate severity.Symptoms are made worse by: nothing. Symptoms are helped by current regimen.  Patient denies associated alopecia, fatigue, nausea, new headache, nodules, oral ulcers, rashes/photosensitive, Raynaud's and seizures.  Overall disease activity:  improved. Limitation on activities as noted in the MDHAQ scanned in the EMR.  Further historical information, including ROS as noted in the multidimensional health assessment questionnaire scanned in the EMR and in the assessment and plan section.  She had knee injected here last visit 10  months ago and continues to derive great benefit from that.  Rheumatoid Arthritis Disease Activity Measure used: RAPID3, reviewed  today and scanned in the chart.    ALLERGIES:Dilantin infatabs [phenytoin] and Phenytoin sodium extended    PAST MEDICAL/ACTIVE PROBLEMS/MEDICATION/SOCIAL DATA  No past medical history on file.  History   Smoking Status     Never Smoker   Smokeless Tobacco     Never Used     Patient Active Problem List   Diagnosis     Mild Persistent Asthma     Hypothyroidism     Hypertension     Vitamin D Deficiency     Dyslipidemia     Seropositive rheumatoid arthritis of multiple sites (H)     High risk medication use     Positive anti-CCP test     Primary osteoarthritis of both knees     Current Outpatient Prescriptions   Medication Sig Dispense Refill     cholecalciferol, vitamin D3, (CHOLECALCIFEROL) 1,000 unit tablet Take 1,000 Units by mouth daily.       folic acid (FOLVITE) 1 MG tablet        folic acid (FOLVITE) 1 MG tablet TAKE 1 TABLET DAILY 90 tablet 3     gabapentin (NEURONTIN) 100 MG capsule Take 300 mg by mouth at bedtime as needed. 90 capsule 2      hydroCHLOROthiazide (HYDRODIURIL) 25 MG tablet TAKE 1 TABLET DAILY 90 tablet 3     hydroxychloroquine (PLAQUENIL) 200 mg tablet TAKE 1 TABLET TWICE A DAY WITH MEALS 180 tablet 0     levothyroxine (SYNTHROID, LEVOTHROID) 125 MCG tablet TAKE 1 TABLET DAILY 90 tablet 1     lisinopril (PRINIVIL,ZESTRIL) 20 MG tablet TAKE 1 TABLET DAILY 90 tablet 3     methotrexate 2.5 MG tablet TAKE 8 TABLETS ONCE A WEEK 96 tablet 0     sulfaSALAzine (AZULFIDINE) 500 mg tablet TAKE 2 TABLETS TWICE A  tablet 0     No current facility-administered medications for this visit.        DETAILED EXAMINATION  11/01/18  :  Vitals:    11/01/18 0812   BP: 110/70   Patient Site: Right Arm   Patient Position: Sitting   Cuff Size: Adult Large   Pulse: 84   Weight: (!) 226 lb (102.5 kg)     Alert oriented. Head including the face is examined for malar rash, heliotropes, scarring, lupus pernio. Eyes examined for redness such as in episcleritis/scleritis, periorbital lesions.   Neck/ Face examined for parotid gland swelling, range of motion of neck.  Left upper and lower and right upper and lower extremities examined for tenderness, swelling, warmth of the appendicular joints, range of motion, edema, rash.  Some of the important findings included: She does not have synovitis in any of the palpable joints of upper and lower extremity.  She has mild tenderness of the first MTPs bilaterally, and mid feet joint worse on the left side where she has early subtle bony hypertrophy which she was able to appreciate as well.  She has mild tenderness of the left medial epicondyles.          LAB / IMAGING DATA:  ALT   Date Value Ref Range Status   10/30/2018 17 0 - 45 U/L Final   08/07/2018 17 0 - 45 U/L Final   05/14/2018 20 0 - 45 U/L Final     Albumin   Date Value Ref Range Status   10/30/2018 4.2 3.5 - 5.0 g/dL Final   08/07/2018 4.0 3.5 - 5.0 g/dL Final   05/14/2018 3.8 3.5 - 5.0 g/dL Final     Creatinine   Date Value Ref Range Status   10/30/2018 1.00  0.60 - 1.10 mg/dL Final   08/07/2018 0.96 0.60 - 1.10 mg/dL Final   05/14/2018 0.96 0.60 - 1.10 mg/dL Final       WBC   Date Value Ref Range Status   10/30/2018 5.0 4.0 - 11.0 thou/uL Final   08/07/2018 4.9 4.0 - 11.0 thou/uL Final   08/04/2015 6.1 4.0 - 11.0 thou/uL Final   06/02/2015 6.4 4.0 - 11.0 thou/uL Final     Hemoglobin   Date Value Ref Range Status   10/30/2018 13.3 12.0 - 16.0 g/dL Final   08/07/2018 13.7 12.0 - 16.0 g/dL Final   05/14/2018 13.4 12.0 - 16.0 g/dL Final     Platelets   Date Value Ref Range Status   10/30/2018 311 140 - 440 thou/uL Final   08/07/2018 314 140 - 440 thou/uL Final   05/14/2018 307 140 - 440 thou/uL Final       Lab Results   Component Value Date    RF 24 02/20/2013    SEDRATE 10 02/20/2013

## 2021-06-22 NOTE — PROGRESS NOTES
FOOT AND ANKLE SURGERY/PODIATRY Progress Note        ASSESSMENT:   Capsulitis 1st MPJ bilateral  HAV      TREATMENT:  -Patient's symptoms appear consistent with capsulitis of the 1st MPJ. We reviewed the etiology and treatment options to include immobilization, anti-inflammatory medication.   -She would like to begin with shoes with stiffer soles and reducing walking/running.  -No surgical intervention necessary at this time. She will consider Naproxen, and/or steroid injection and will follow-up if symptoms have not improved over the next 3-4 weeks.    Danial Roe, SUSIE  [facility]       HPI: I was asked to see Isabela STRATTON Voit today for bilateral foot pain which started 2 months ago. She describes pain along the medial right foot which has increased with ambulation. 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     No past medical history on file.    Allergies   Allergen Reactions     Dilantin Infatabs [Phenytoin]      Phenytoin Sodium Extended Hives         Current Outpatient Medications:      cholecalciferol, vitamin D3, (CHOLECALCIFEROL) 1,000 unit tablet, Take 1,000 Units by mouth daily., Disp: , Rfl:      folic acid (FOLVITE) 1 MG tablet, TAKE 1 TABLET DAILY, Disp: 90 tablet, Rfl: 3     gabapentin (NEURONTIN) 100 MG capsule, Take 300 mg by mouth at bedtime as needed., Disp: 90 capsule, Rfl: 2     hydroCHLOROthiazide (HYDRODIURIL) 25 MG tablet, TAKE 1 TABLET DAILY, Disp: 90 tablet, Rfl: 3     hydroxychloroquine (PLAQUENIL) 200 mg tablet, TAKE 1 TABLET TWICE A DAY WITH MEALS, Disp: 180 tablet, Rfl: 0     levothyroxine (SYNTHROID, LEVOTHROID) 125 MCG tablet, TAKE 1 TABLET DAILY, Disp: 90 tablet, Rfl: 1     lisinopril (PRINIVIL,ZESTRIL) 20 MG tablet, TAKE 1 TABLET DAILY, Disp: 90 tablet, Rfl: 3     methotrexate 2.5 MG tablet, TAKE 8 TABLETS ONCE A WEEK., Disp: 96 tablet, Rfl: 0     sulfaSALAzine (AZULFIDINE) 500 mg tablet, TAKE 2  TABLETS TWICE A DAY, Disp: 360 tablet, Rfl: 0    Family History   Problem Relation Age of Onset     Breast cancer Mother 62     Hyperlipidemia Mother      Hypertension Mother      Diabetes Father      Hyperlipidemia Father      Hypertension Father      Heart disease Maternal Grandfather        Review of Systems - per HPI, all others negative       OBJECTIVE:  Appearance: alert, well appearing, and in no distress.    Vitals:    12/03/18 0846   BP: 126/78       Vascular: Dorsalis pedis and posterior tibial pulses are palpable bilateral. There is pedal hair growth bilateral.  CFT < 3 sec from anterior tibial surface to distal digits bilateral. There is no appreciable edema noted.  Dermatologic: Turgor and texture are within normal limits. No coloration or temperature changes. No primary or secondary lesions noted.  Neurologic: All epicritic and proprioceptive sensations are grossly intact bilateral.  Musculoskeletal: No pain along 1st MPJ bilateral, ROM is full, no crepitus noted. No pain along forefoot or midfoot bilateral. Full STJ/ankle ROM bilateral. Very mild bunion left foot.     Imaging: EXAM DATE:         12/03/2018     3 VIEWS OF BOTH FEET 12/3/2018     INDICATION: Bilateral foot pain     COMPARISON: None     REPORT:     LEFT FOOT: Hindfoot valgus. Degenerative change first MTP joint with mild bunion  deformity. Minimal plantar calcaneal spurring. Left foot negative for fracture.     RIGHT FOOT: Mild degenerative change first MTP joint. Less advanced hallux  valgus. No evidence for fracture. Plantar and Achilles calcaneal spurring.

## 2021-06-23 NOTE — PROGRESS NOTES
Clinic Note    Assessment:     Assessment and Plan:  1. Cough  Normal vital signs.  No red flags in HPI PE.  Albuterol inhaler was sent in for possible asthma, but I did not hear any wheezing on exam.  Benzonatate during the day.  Codeine/guaifenesin at night to help her sleep.  Anticipatory guidance was provided.  Follow-up within 1 week if not starting to see any progress.       Patient Instructions   Benzonatate tablets every 8 hours.    I sent in codeine cough medication for you to help you sleep at night.    Albuterol inhaler every 6 hours as needed for cough/wheezing.    See how you are feeling by the middle of next week, if you are not noticing any progress, we should consider getting an x-ray of your chest as well as possible antibiotics.    Return for If symptoms do not start to improve in one week..         Subjective:      Patient comes to clinic today for cough.    She has had a cough for 2 weeks.  No fever.  No body aches.  She thinks that she may have a remote history of allergy induced asthma-she had an albuterol nebulizer treatment in the past which seemed to help her symptoms.  She does have mild persistent asthma listed in her problem list.    No wheezing.  Cough seems to be worse at night.  No postnasal drip.  Not much nasal discharge.  Throat feels raw from coughing.  She is tried Coricidin HBP.  Warm tea with honey.  No chest pain.  No shortness of breath.    The following portions of the patient's history were reviewed and updated as appropriate: Allergies, Medications, Problem List, Prior note.     Review of Systems:    Review is otherwise negative except for what is mentioned above.     Social Hx:    Social History     Tobacco Use   Smoking Status Never Smoker   Smokeless Tobacco Never Used         Objective:     Vitals:    01/18/19 0826   BP: 120/68   Pulse: 76   Temp: 98.1  F (36.7  C)   TempSrc: Oral   Weight: (!) 227 lb 4.8 oz (103.1 kg)       Exam:    General: No apparent distress. Calm.  Alert and Oriented X3. Pt behavior is appropriate.  Head:Atraumatic. Normocephalic, non-tender to palpation  Neck: Supple. No JVD. Full ROM. No adenopathy  Eyes: PERRL, No discharge. No strabismus. No nystagmus.  Ears: TMs pearly gray with landmarks visible.   Nose/Mouth/Throat: Patent nares, no oral lesions, pharynx clear and without exudate. Uvula mid-line. Nasal septum mid-line. Clear turbinates.   Lymph: No axillar or cervical adenopathy.   Chest/Lungs: Normal chest wall, clear to auscultation, normal respiratory effort and rate.   Heart/Pulses: Regular rate and rhythm, strong and equal radial pulses, no murmurs. Capillary refill <2 seconds. No edema.   Abdomen: Soft, no palpable masses. No hepatosplenomegaly, no tenderness with palpation noted. Bowel sounds active in all quadrants. No increased tympany.   Genitalia: Not examined.   Musculoskeletal: No CVA tenderness with palpation. Good ROM with extremities.   Neurologic: Interactive, alert, no focal findings, CNs intact.   Skin: Warm, dry. Normal hair pattern. Free of lesions. Normal skin turgor.       Patient Active Problem List   Diagnosis     Mild Persistent Asthma     Hypothyroidism     Hypertension     Vitamin D Deficiency     Dyslipidemia     Seropositive rheumatoid arthritis of multiple sites (H)     High risk medication use     Positive anti-CCP test     Primary osteoarthritis of both knees     Primary osteoarthritis involving multiple joints     Current Outpatient Medications   Medication Sig Dispense Refill     cholecalciferol, vitamin D3, (CHOLECALCIFEROL) 1,000 unit tablet Take 1,000 Units by mouth daily.       folic acid (FOLVITE) 1 MG tablet TAKE 1 TABLET DAILY 90 tablet 3     gabapentin (NEURONTIN) 100 MG capsule Take 300 mg by mouth at bedtime as needed. 90 capsule 2     hydroCHLOROthiazide (HYDRODIURIL) 25 MG tablet TAKE 1 TABLET DAILY 90 tablet 3     hydroxychloroquine (PLAQUENIL) 200 mg tablet TAKE 1 TABLET TWICE A DAY WITH MEALS 180 tablet  0     levothyroxine (SYNTHROID, LEVOTHROID) 125 MCG tablet TAKE 1 TABLET DAILY 90 tablet 1     lisinopril (PRINIVIL,ZESTRIL) 20 MG tablet TAKE 1 TABLET DAILY 90 tablet 3     methotrexate 2.5 MG tablet TAKE 8 TABLETS ONCE A WEEK. 96 tablet 0     sulfaSALAzine (AZULFIDINE) 500 mg tablet TAKE 2 TABLETS TWICE A  tablet 0     albuterol (PROAIR HFA;PROVENTIL HFA;VENTOLIN HFA) 90 mcg/actuation inhaler Inhale 2 puffs every 6 (six) hours as needed for wheezing. 1 each 0     benzonatate (TESSALON) 200 MG capsule Take 1 capsule (200 mg total) by mouth 3 (three) times a day as needed for cough. 60 capsule 0     codeine-guaiFENesin (GUAIFENESIN AC)  mg/5 mL liquid Take 5 mL by mouth 3 (three) times a day as needed for cough. 236 mL 0     No current facility-administered medications for this visit.        I spent 25 minutes with patient face to face, of which >50% was counseling regarding the above plan       Jenaro Rich (Rob), FAVIAN    1/18/2019            no

## 2021-06-23 NOTE — TELEPHONE ENCOUNTER
Patient here for lab only appt today for standing jjad orders and lab order must have  . Please place orders ASAP as we have blood in the lab already. Call lab with questions. Thanks.

## 2021-06-23 NOTE — PATIENT INSTRUCTIONS - HE
Benzonatate tablets every 8 hours.    I sent in codeine cough medication for you to help you sleep at night.    Albuterol inhaler every 6 hours as needed for cough/wheezing.    See how you are feeling by the middle of next week, if you are not noticing any progress, we should consider getting an x-ray of your chest as well as possible antibiotics.

## 2021-06-23 NOTE — TELEPHONE ENCOUNTER
Refilled per Rheum RN refill protocol  Pt has labs scheduled for 1/29/19 and again in April with follow up appt

## 2021-06-25 NOTE — TELEPHONE ENCOUNTER
Pt due for tetanus, last one was 2011. Ok to wait for the fall or should she make nurse appt to have done now? Please advise, thanks

## 2021-06-25 NOTE — PROGRESS NOTES
"    Assessment & Plan     Strain of right quadriceps  No need for significant intervention at this time.  I do not think we need any imaging.  See patient instructions below for plan of care    Patient Instructions   Your knee joint is intact with no evidence of tendon or ligament damage.    I suspect that your problem is quadricep related.  Use heat on your lower thigh for 15 minutes 3 times per day.    Do some gentle stretching a few times per day.    Keep up your walking regimen.    It may take another few weeks for you to get back to your baseline.  If not, come back for reevaluation and consideration of imaging        18 minutes spent on the date of the encounter doing chart review, history and exam, documentation and further activities per the note       BMI:   Estimated body mass index is 35.23 kg/m  as calculated from the following:    Height as of 10/1/20: 5' 5.75\" (1.67 m).    Weight as of this encounter: 216 lb 9.6 oz (98.2 kg).       Return in about 4 months (around 10/3/2021).    Jenaro Rich [facility]   Isabela STRATTON Voit is 55 y.o. and presents today for the following health issues   HPI   She was playing pickle ball 3 weeks ago when she noticed some soreness and swelling in her bilateral knees the day afterward.    The swelling in her knees eventually subsided but she noticed some stiffness in her right knee, distal quadricep area that has persisted and does not seem to be getting much better.    She will do some intermittent icing without much success.  Mild stretching.  She continues to walk on a daily basis, typically 60 minutes/day    She has no posterior knee pain.  No crepitation.  No calf tenderness.    No redness or swelling noted at this time    Review of Systems  Negative      Objective    /84 (Patient Site: Right Arm, Patient Position: Sitting, Cuff Size: Adult Large)   Pulse 71   Wt 216 lb 9.6 oz (98.2 kg)   BMI 35.23 kg/m    Body " mass index is 35.23 kg/m .  Physical Exam  Anterior/posterior drawer testing normal.  Negative varus/valgus maneuver.  No crepitation with Camryn's.  Tenderness with palpation to the distal quadricep musculature

## 2021-06-26 NOTE — PATIENT INSTRUCTIONS - HE
Your knee joint is intact with no evidence of tendon or ligament damage.    I suspect that your problem is quadricep related.  Use heat on your lower thigh for 15 minutes 3 times per day.    Do some gentle stretching a few times per day.    Keep up your walking regimen.    It may take another few weeks for you to get back to your baseline.  If not, come back for reevaluation and consideration of imaging

## 2021-06-27 ENCOUNTER — HEALTH MAINTENANCE LETTER (OUTPATIENT)
Age: 56
End: 2021-06-27

## 2021-07-03 NOTE — ADDENDUM NOTE
Addendum Note by Tita Cain RN at 5/2/2019  7:40 AM     Author: Tita Cain RN Service: -- Author Type: Registered Nurse    Filed: 5/2/2019 11:28 AM Encounter Date: 5/2/2019 Status: Signed    : Tita Cain RN (Registered Nurse)    Addended by: TITA CAIN on: 5/2/2019 11:28 AM        Modules accepted: Orders

## 2021-07-06 VITALS
WEIGHT: 216.6 LBS | HEART RATE: 71 BPM | BODY MASS INDEX: 35.23 KG/M2 | DIASTOLIC BLOOD PRESSURE: 84 MMHG | SYSTOLIC BLOOD PRESSURE: 138 MMHG

## 2021-07-08 ASSESSMENT — ASTHMA QUESTIONNAIRES: ACT_TOTALSCORE: 25

## 2021-07-13 ENCOUNTER — RECORDS - HEALTHEAST (OUTPATIENT)
Dept: ADMINISTRATIVE | Facility: CLINIC | Age: 56
End: 2021-07-13

## 2021-07-21 ENCOUNTER — RECORDS - HEALTHEAST (OUTPATIENT)
Dept: ADMINISTRATIVE | Facility: CLINIC | Age: 56
End: 2021-07-21

## 2021-07-22 ENCOUNTER — RECORDS - HEALTHEAST (OUTPATIENT)
Dept: INTERNAL MEDICINE | Facility: CLINIC | Age: 56
End: 2021-07-22

## 2021-07-22 DIAGNOSIS — Z12.31 OTHER SCREENING MAMMOGRAM: ICD-10-CM

## 2021-07-27 ENCOUNTER — TELEPHONE (OUTPATIENT)
Dept: INTERNAL MEDICINE | Facility: CLINIC | Age: 56
End: 2021-07-27

## 2021-07-27 DIAGNOSIS — Z23 NEED FOR TD VACCINE: Primary | ICD-10-CM

## 2021-07-27 NOTE — TELEPHONE ENCOUNTER
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  She will be coming in August through 2021.  Patient had the Tdap, June 6, 2011.    Taryn Koch MD

## 2021-07-27 NOTE — TELEPHONE ENCOUNTER
----- Message from Jocelin Acuna CMA sent at 7/27/2021  4:04 PM CDT -----  Regarding: Td order  Isabela Patel is coming in on 8/03/21 for her Td booster.  There is no order in her chart.  If appropriate, can you please put in an order for the booster and sign it?      Thank you,  HELENE George

## 2021-08-03 ENCOUNTER — LAB (OUTPATIENT)
Dept: LAB | Facility: CLINIC | Age: 56
End: 2021-08-03
Payer: COMMERCIAL

## 2021-08-03 ENCOUNTER — ALLIED HEALTH/NURSE VISIT (OUTPATIENT)
Dept: FAMILY MEDICINE | Facility: CLINIC | Age: 56
End: 2021-08-03
Payer: COMMERCIAL

## 2021-08-03 DIAGNOSIS — M05.79 SEROPOSITIVE RHEUMATOID ARTHRITIS OF MULTIPLE SITES (H): ICD-10-CM

## 2021-08-03 DIAGNOSIS — Z23 NEED FOR TD VACCINE: ICD-10-CM

## 2021-08-03 LAB
ALBUMIN SERPL-MCNC: 3.9 G/DL (ref 3.5–5)
ALT SERPL W P-5'-P-CCNC: 23 U/L (ref 0–45)
CREAT SERPL-MCNC: 0.95 MG/DL (ref 0.6–1.1)
ERYTHROCYTE [DISTWIDTH] IN BLOOD BY AUTOMATED COUNT: 13.4 % (ref 10–15)
GFR SERPL CREATININE-BSD FRML MDRD: 66 ML/MIN/1.73M2
HCT VFR BLD AUTO: 38.3 % (ref 35–47)
HGB BLD-MCNC: 12.6 G/DL (ref 11.7–15.7)
MCH RBC QN AUTO: 31.3 PG (ref 26.5–33)
MCHC RBC AUTO-ENTMCNC: 32.9 G/DL (ref 31.5–36.5)
MCV RBC AUTO: 95 FL (ref 78–100)
PLATELET # BLD AUTO: 268 10E3/UL (ref 150–450)
RBC # BLD AUTO: 4.03 10E6/UL (ref 3.8–5.2)
WBC # BLD AUTO: 4.9 10E3/UL (ref 4–11)

## 2021-08-03 PROCEDURE — 90471 IMMUNIZATION ADMIN: CPT

## 2021-08-03 PROCEDURE — 90714 TD VACC NO PRESV 7 YRS+ IM: CPT

## 2021-08-03 PROCEDURE — 82040 ASSAY OF SERUM ALBUMIN: CPT

## 2021-08-03 PROCEDURE — 36415 COLL VENOUS BLD VENIPUNCTURE: CPT

## 2021-08-03 PROCEDURE — 84460 ALANINE AMINO (ALT) (SGPT): CPT

## 2021-08-03 PROCEDURE — 85027 COMPLETE CBC AUTOMATED: CPT

## 2021-08-03 PROCEDURE — 82565 ASSAY OF CREATININE: CPT

## 2021-08-05 ENCOUNTER — VIRTUAL VISIT (OUTPATIENT)
Dept: RHEUMATOLOGY | Facility: CLINIC | Age: 56
End: 2021-08-05
Payer: COMMERCIAL

## 2021-08-05 DIAGNOSIS — Z79.899 HIGH RISK MEDICATION USE: ICD-10-CM

## 2021-08-05 DIAGNOSIS — M15.0 PRIMARY OSTEOARTHRITIS INVOLVING MULTIPLE JOINTS: ICD-10-CM

## 2021-08-05 DIAGNOSIS — R76.8 POSITIVE ANTI-CCP TEST: ICD-10-CM

## 2021-08-05 DIAGNOSIS — M05.79 SEROPOSITIVE RHEUMATOID ARTHRITIS OF MULTIPLE SITES (H): Primary | ICD-10-CM

## 2021-08-05 PROCEDURE — 99214 OFFICE O/P EST MOD 30 MIN: CPT | Mod: GT | Performed by: INTERNAL MEDICINE

## 2021-08-05 RX ORDER — SULFASALAZINE 500 MG/1
1000 TABLET ORAL 2 TIMES DAILY
Qty: 360 TABLET | Refills: 0 | Status: SHIPPED | OUTPATIENT
Start: 2021-08-05 | End: 2021-11-23

## 2021-08-05 NOTE — PROGRESS NOTES
Isabela is a 56 year old who is being evaluated via a billable video visit.      How would you like to obtain your AVS? MyChart  If the video visit is dropped, the invitation should be resent by: [phone]  Will anyone else be joining your video visit? No       Video-Visit Details    Type of service:  Video Visit    Start: 08/05/2021 02:46 pm  Stop: 08/05/2021 02:53 pm       Originating Location (pt. Location): Home    Distant Location (provider location):  [facility]     Platform used for Video Visit: Search[employer]     This document was created using a software with less than 100% fidelity, at times resulting in unintended, even erroneous syntax and grammar.  The reader is advised to keep this under consideration while reviewing, interpreting this note.           ASSESSMENT AND PLAN:    Diagnoses and all orders for this visit:  Seropositive rheumatoid arthritis of multiple sites (H)  -     sulfaSALAzine (AZULFIDINE) 500 MG tablet; Take 2 tablets (1,000 mg) by mouth 2 times daily  Primary osteoarthritis involving multiple joints  High risk medication use  Positive anti-CCP test      Follow up in 6 months      HISTORY OF PRESENTING ILLNESS:  Isabela A Voit 56 year old is evaluated here via video/audio link.   This patient is here for follow-up.  She has rheumatoid arthritis.  She has osteoarthritis.  She is doing great with the current regimen.  She has noted some discomfort in her right index finger DIP and PIPs.  Worse with holding onto something tight like writing.  Overall not impairing her day-to-day activities, not waking her up from sleep.  They are planning to go for a cruise that was canceled at the peak of the pandemic, and they will be [place].  Her  sustained stroke and is making good recovery from he has a left half weakness.  Recent labs are normal.  Reminded of eye examination.   ROS enquiry held for fever, ocular symptoms, rash, headache,  GI issues.  Today we also discussed the  issues related to the current pandemic, the pros and cons of the current treatment plan, the CDC guidelines such as social distancing washing the hands covering the cough.  ALLERGIES:Dilantin infatabs [phenytoin] and Phenytoin sodium extended [phenytoin]    PAST MEDICAL/ACTIVE PROBLEMS/MEDICATION/SOCIAL DATA  No past medical history on file.  History   Smoking Status     Never Smoker   Smokeless Tobacco     Never Used     Patient Active Problem List   Diagnosis     Asthma     Hypothyroidism     Hypertension     Vitamin D Deficiency     Dyslipidemia     Seropositive rheumatoid arthritis of multiple sites (H)     High risk medication use     Positive anti-CCP test     Primary osteoarthritis involving multiple joints     Chronic kidney disease, stage 3     Current Outpatient Medications   Medication Sig Dispense Refill     cholecalciferol, vitamin D3, (CHOLECALCIFEROL) 1,000 unit tablet [CHOLECALCIFEROL, VITAMIN D3, (CHOLECALCIFEROL) 1,000 UNIT TABLET] Take 1,000 Units by mouth daily.       folic acid (FOLVITE) 1 MG tablet [FOLIC ACID (FOLVITE) 1 MG TABLET] TAKE 1 TABLET BY MOUTH  DAILY 90 tablet 3     hydroCHLOROthiazide (HYDRODIURIL) 25 MG tablet [HYDROCHLOROTHIAZIDE (HYDRODIURIL) 25 MG TABLET] TAKE 1 TABLET BY MOUTH  DAILY 90 tablet 3     hydroxychloroquine (PLAQUENIL) 200 MG tablet Take 1 tablet (200 mg) by mouth 2 times daily (with meals) 180 tablet 0     levothyroxine (SYNTHROID, LEVOTHROID) 125 MCG tablet [LEVOTHYROXINE (SYNTHROID, LEVOTHROID) 125 MCG TABLET] TAKE 1 TABLET BY MOUTH  DAILY 90 tablet 1     lisinopriL (PRINIVIL,ZESTRIL) 20 MG tablet [LISINOPRIL (PRINIVIL,ZESTRIL) 20 MG TABLET] TAKE 1 TABLET BY MOUTH  DAILY 90 tablet 3     methotrexate 2.5 MG tablet Take 8 tablets (20 mg) by mouth every 7 days 96 tablet 0     sulfaSALAzine (AZULFIDINE) 500 mg tablet [SULFASALAZINE (AZULFIDINE) 500 MG TABLET] Take 2 tablets (1,000 mg total) by mouth 2 (two) times a day. 360 tablet 0         EXAMINATION:    Using the  audio and video link as best as possible the constitutional, neck, neurologic, psych, skin, both upper extremities areas/organ system were evaluated during this assessment.  Some of the important findings: Alert, oriented, speech fluent.    Able to fully flex the digits, into fists bilaterally, wrist and elbow range of motion appear normal, abduction of the shoulder is normal.      LAB / IMAGING DATA:  ALT   Date Value Ref Range Status   08/03/2021 23 0 - 45 U/L Final   05/04/2021 28 0 - 45 U/L Final   02/02/2021 23 0 - 45 U/L Final     Albumin   Date Value Ref Range Status   08/03/2021 3.9 3.5 - 5.0 g/dL Final   05/04/2021 4.0 3.5 - 5.0 g/dL Final   02/02/2021 4.2 3.5 - 5.0 g/dL Final       WBC   Date Value Ref Range Status   05/04/2021 5.6 4.0 - 11.0 thou/uL Final   02/02/2021 5.2 4.0 - 11.0 thou/uL Final     WBC Count   Date Value Ref Range Status   08/03/2021 4.9 4.0 - 11.0 10e3/uL Final     Hemoglobin   Date Value Ref Range Status   08/03/2021 12.6 11.7 - 15.7 g/dL Final   05/04/2021 12.9 12.0 - 16.0 g/dL Final   02/02/2021 12.7 12.0 - 16.0 g/dL Final     Platelet Count   Date Value Ref Range Status   08/03/2021 268 150 - 450 10e3/uL Final   05/04/2021 292 140 - 440 thou/uL Final   02/02/2021 274 140 - 440 thou/uL Final       No results found for: HEATHER

## 2021-08-09 ENCOUNTER — MYC REFILL (OUTPATIENT)
Dept: FAMILY MEDICINE | Facility: CLINIC | Age: 56
End: 2021-08-09

## 2021-08-09 DIAGNOSIS — M05.79 SEROPOSITIVE RHEUMATOID ARTHRITIS OF MULTIPLE SITES (H): ICD-10-CM

## 2021-08-10 RX ORDER — HYDROXYCHLOROQUINE SULFATE 200 MG/1
200 TABLET, FILM COATED ORAL 2 TIMES DAILY WITH MEALS
Qty: 180 TABLET | Refills: 0 | Status: SHIPPED | OUTPATIENT
Start: 2021-08-10 | End: 2021-11-23

## 2021-09-09 ENCOUNTER — IMMUNIZATION (OUTPATIENT)
Dept: NURSING | Facility: CLINIC | Age: 56
End: 2021-09-09
Payer: COMMERCIAL

## 2021-09-09 PROCEDURE — 0003A PR COVID VAC PFIZER DIL RECON 30 MCG/0.3 ML IM: CPT

## 2021-09-09 PROCEDURE — 91300 PR COVID VAC PFIZER DIL RECON 30 MCG/0.3 ML IM: CPT

## 2021-09-15 ENCOUNTER — MYC REFILL (OUTPATIENT)
Dept: FAMILY MEDICINE | Facility: CLINIC | Age: 56
End: 2021-09-15

## 2021-09-15 DIAGNOSIS — M05.79 SEROPOSITIVE RHEUMATOID ARTHRITIS OF MULTIPLE SITES (H): ICD-10-CM

## 2021-10-05 ENCOUNTER — OFFICE VISIT (OUTPATIENT)
Dept: INTERNAL MEDICINE | Facility: CLINIC | Age: 56
End: 2021-10-05
Payer: COMMERCIAL

## 2021-10-05 VITALS
HEIGHT: 66 IN | WEIGHT: 221.8 LBS | BODY MASS INDEX: 35.65 KG/M2 | HEART RATE: 87 BPM | OXYGEN SATURATION: 97 % | SYSTOLIC BLOOD PRESSURE: 120 MMHG | DIASTOLIC BLOOD PRESSURE: 73 MMHG

## 2021-10-05 DIAGNOSIS — E03.9 ACQUIRED HYPOTHYROIDISM: ICD-10-CM

## 2021-10-05 DIAGNOSIS — M05.79 SEROPOSITIVE RHEUMATOID ARTHRITIS OF MULTIPLE SITES (H): ICD-10-CM

## 2021-10-05 DIAGNOSIS — E66.01 MORBID OBESITY (H): ICD-10-CM

## 2021-10-05 DIAGNOSIS — I10 ESSENTIAL HYPERTENSION: ICD-10-CM

## 2021-10-05 DIAGNOSIS — Z00.00 HEALTHCARE MAINTENANCE: Primary | ICD-10-CM

## 2021-10-05 DIAGNOSIS — G47.33 OSA (OBSTRUCTIVE SLEEP APNEA): ICD-10-CM

## 2021-10-05 DIAGNOSIS — E78.5 DYSLIPIDEMIA: ICD-10-CM

## 2021-10-05 PROBLEM — N18.30 CHRONIC KIDNEY DISEASE, STAGE 3 (H): Status: RESOLVED | Noted: 2021-02-08 | Resolved: 2021-10-05

## 2021-10-05 PROCEDURE — 99396 PREV VISIT EST AGE 40-64: CPT | Mod: 25 | Performed by: INTERNAL MEDICINE

## 2021-10-05 PROCEDURE — 90686 IIV4 VACC NO PRSV 0.5 ML IM: CPT | Performed by: INTERNAL MEDICINE

## 2021-10-05 PROCEDURE — 90471 IMMUNIZATION ADMIN: CPT | Performed by: INTERNAL MEDICINE

## 2021-10-05 ASSESSMENT — MIFFLIN-ST. JEOR: SCORE: 1598.86

## 2021-10-05 NOTE — PROGRESS NOTES
Assessment:     Healthy female exam.   All preventive exams are up-to-date.  Fasting labs will be done next week.    The following high BMI interventions were performed this visit: encouragement to exercise and weight monitoring  This note has been dictated using voice recognition software. Any grammatical or context distortions are unintentional and inherent to the software    (Z00.00) Healthcare maintenance  (primary encounter diagnosis)      (M05.79) Seropositive rheumatoid arthritis of multiple sites (H)  Comment: Stable, seeing Dr Lyn.  Plan: Vitamin D Deficiency            (E03.9) Acquired hypothyroidism  Comment: on levothyroxine  Plan: TSH with free T4 reflex            (I10) Hypertension  Comment: stable on hydrochlorothiazide and lisinopril.   Plan: Comprehensive metabolic panel, SLEEP EVALUATION        & MANAGEMENT REFERRAL - ADULT -            (E78.5) Dyslipidemia    Plan: Lipid panel reflex to direct LDL Fasting            (E66.01) Morbid obesity (H)  Plan: SLEEP EVALUATION & MANAGEMENT REFERRAL - ADULT         -            (G47.33) YUVAL (obstructive sleep apnea)    Plan: SLEEP EVALUATION & MANAGEMENT REFERRAL - ADULT         -                Patient Instructions   Flu vaccine.    Come back for fasting labs.    To schedule Sleep clinic .      Plan:          Return in about 1 year (around 10/5/2022) for AWV.    Subjective:       chief complaint    Isabela Olivo is a 56 year old female who presents for an annual exam.     Healthy Habits:   Regular Exercise: Yes  Sunscreen Use: Yes  Healthy Diet: Yes  Dental Visits Regularly: Yes  Seat Belt: Yes  Immunization status: up to date and documented.    Health Maintenance   Topic Date Due     ANNUAL REVIEW OF HM ORDERS  Never done     ASTHMA ACTION PLAN  Never done     ADVANCE CARE PLANNING  Never done     Pneumococcal Vaccine: Pediatrics (0 to 5 Years) and At-Risk Patients (6 to 64 Years) (1 of 2 - PPSV23) Never done     HIV SCREENING  Never done     PREVENTIVE  CARE VISIT  10/01/2021     ASTHMA CONTROL TEST  12/03/2021     MAMMO SCREENING  02/18/2023     PAP  10/01/2023     COLORECTAL CANCER SCREENING  01/10/2024     LIPID  11/03/2025     DTAP/TDAP/TD IMMUNIZATION (3 - Td or Tdap) 08/03/2031     HEPATITIS C SCREENING  Completed     PHQ-2  Completed     INFLUENZA VACCINE  Completed     ZOSTER IMMUNIZATION  Completed     COVID-19 Vaccine  Completed     IPV IMMUNIZATION  Aged Out     MENINGITIS IMMUNIZATION  Aged Out     HEPATITIS B IMMUNIZATION  Aged Out       Social History     Socioeconomic History     Marital status:      Spouse name: Not on file     Number of children: Not on file     Years of education: Not on file     Highest education level: Not on file   Occupational History     Not on file   Tobacco Use     Smoking status: Never Smoker     Smokeless tobacco: Never Used   Substance and Sexual Activity     Alcohol use: No     Drug use: Not on file     Sexual activity: Yes   Other Topics Concern     Not on file   Social History Narrative     Not on file     Social Determinants of Health     Financial Resource Strain:      Difficulty of Paying Living Expenses:    Food Insecurity:      Worried About Running Out of Food in the Last Year:      Ran Out of Food in the Last Year:    Transportation Needs:      Lack of Transportation (Medical):      Lack of Transportation (Non-Medical):    Physical Activity:      Days of Exercise per Week:      Minutes of Exercise per Session:    Stress:      Feeling of Stress :    Social Connections:      Frequency of Communication with Friends and Family:      Frequency of Social Gatherings with Friends and Family:      Attends [employer] Services:      Active Member of Clubs or Organizations:      Attends Club or Organization Meetings:      Marital Status:    Intimate Partner Violence:      Fear of Current or Ex-Partner:      Emotionally Abused:      Physically Abused:      Sexually Abused:        Family History   Problem Relation Age  "of Onset     Breast Cancer Mother 62.00     Hyperlipidemia Mother      Hypertension Mother      Diabetes Father      Hyperlipidemia Father      Hypertension Father      Heart Disease Maternal Grandfather        Patient Active Problem List   Diagnosis     Asthma     Hypothyroidism     Hypertension     Vitamin D Deficiency     Dyslipidemia     Seropositive rheumatoid arthritis of multiple sites (H)     High risk medication use     Positive anti-CCP test     Primary osteoarthritis involving multiple joints     Morbid obesity (H)       Current Outpatient Medications   Medication     Bioflavonoid Products (VITAMIN C) CHEW     cholecalciferol, vitamin D3, (CHOLECALCIFEROL) 1,000 unit tablet     folic acid (FOLVITE) 1 MG tablet     hydroCHLOROthiazide (HYDRODIURIL) 25 MG tablet     hydroxychloroquine (PLAQUENIL) 200 MG tablet     levothyroxine (SYNTHROID, LEVOTHROID) 125 MCG tablet     lisinopriL (PRINIVIL,ZESTRIL) 20 MG tablet     methotrexate 2.5 MG tablet     sulfaSALAzine (AZULFIDINE) 500 MG tablet     No current facility-administered medications for this visit.       Review of Systems  A 12 point comprehensive review of systems was negative except as noted in HPI.         Objective:      /73   Pulse 87   Ht 1.67 m (5' 5.75\")   Wt 100.6 kg (221 lb 12.8 oz)   SpO2 97%   BMI 36.07 kg/m      Body mass index is 36.07 kg/m .        Physical Exam:  General Appearance: Alert, cooperative, no distress, appears stated age.  Head: Normocephalic, without obvious abnormality, atraumatic  Eyes: PERRL, conjunctiva/corneas clear, EOM's intact  Ears: Normal TM's and external ear canals, both ears  Nose: Nares normal, septum midline,mucosa normal, no drainage  Throat: Lips, mucosa, and tongue normal; teeth and gums normal  Neck: Supple, symmetrical, trachea midline, no adenopathy;  thyroid: not enlarged, symmetric, no tenderness/mass/nodules; no carotid bruit or JVD  Back: Symmetric, no curvature, ROM normal, no CVA " tenderness.  Lungs: Clear to auscultation bilaterally, respirations unlabored.  Breasts: No breast masses, tenderness, asymmetry, or nipple discharge.  Heart: Regular rate and rhythm, S1 and S2 normal, no murmur, rub, or gallop.  Abdomen: Soft, non-tender, bowel sounds active all four quadrants,  no masses, no organomegaly.  Pelvic:Not done today.  Extremities: Extremities normal, atraumatic, no cyanosis or edema.  Skin: Skin color, texture, turgor normal, no rashes or lesions.  Lymph nodes: Cervical, supraclavicular, and axillary nodes normal.  Neurologic: No focal neurological findings.           Answers for HPI/ROS submitted by the patient on 9/30/2021  Frequency of exercise:: 2-3 days/week  Getting at least 3 servings of Calcium per day:: Yes  Diet:: Regular (no restrictions)  Taking medications regularly:: No  Medication side effects:: None  Bi-annual eye exam:: Yes  Dental care twice a year:: Yes  Sleep apnea or symptoms of sleep apnea:: Excessive snoring  Additional concerns today:: Yes  Duration of exercise:: 30-45 minutes

## 2021-10-08 ENCOUNTER — LAB (OUTPATIENT)
Dept: LAB | Facility: CLINIC | Age: 56
End: 2021-10-08
Payer: COMMERCIAL

## 2021-10-08 DIAGNOSIS — I10 ESSENTIAL HYPERTENSION: ICD-10-CM

## 2021-10-08 DIAGNOSIS — M05.79 SEROPOSITIVE RHEUMATOID ARTHRITIS OF MULTIPLE SITES (H): ICD-10-CM

## 2021-10-08 DIAGNOSIS — E78.5 DYSLIPIDEMIA: ICD-10-CM

## 2021-10-08 DIAGNOSIS — E03.9 ACQUIRED HYPOTHYROIDISM: ICD-10-CM

## 2021-10-08 LAB
ALBUMIN SERPL-MCNC: 4 G/DL (ref 3.5–5)
ALP SERPL-CCNC: 59 U/L (ref 45–120)
ALT SERPL W P-5'-P-CCNC: 26 U/L (ref 0–45)
ANION GAP SERPL CALCULATED.3IONS-SCNC: 8 MMOL/L (ref 5–18)
AST SERPL W P-5'-P-CCNC: 28 U/L (ref 0–40)
BILIRUB SERPL-MCNC: 0.6 MG/DL (ref 0–1)
BUN SERPL-MCNC: 11 MG/DL (ref 8–22)
CALCIUM SERPL-MCNC: 9.5 MG/DL (ref 8.5–10.5)
CHLORIDE BLD-SCNC: 105 MMOL/L (ref 98–107)
CHOLEST SERPL-MCNC: 221 MG/DL
CO2 SERPL-SCNC: 28 MMOL/L (ref 22–31)
CREAT SERPL-MCNC: 0.89 MG/DL (ref 0.6–1.1)
FASTING STATUS PATIENT QL REPORTED: ABNORMAL
GFR SERPL CREATININE-BSD FRML MDRD: 72 ML/MIN/1.73M2
GLUCOSE BLD-MCNC: 103 MG/DL (ref 70–125)
HDLC SERPL-MCNC: 70 MG/DL
LDLC SERPL CALC-MCNC: 132 MG/DL
POTASSIUM BLD-SCNC: 4 MMOL/L (ref 3.5–5)
PROT SERPL-MCNC: 6.8 G/DL (ref 6–8)
SODIUM SERPL-SCNC: 141 MMOL/L (ref 136–145)
TRIGL SERPL-MCNC: 97 MG/DL
TSH SERPL DL<=0.005 MIU/L-ACNC: 2.34 UIU/ML (ref 0.3–5)

## 2021-10-08 PROCEDURE — 84443 ASSAY THYROID STIM HORMONE: CPT

## 2021-10-08 PROCEDURE — 82306 VITAMIN D 25 HYDROXY: CPT

## 2021-10-08 PROCEDURE — 36415 COLL VENOUS BLD VENIPUNCTURE: CPT

## 2021-10-08 PROCEDURE — 80053 COMPREHEN METABOLIC PANEL: CPT

## 2021-10-08 PROCEDURE — 80061 LIPID PANEL: CPT

## 2021-10-11 LAB — DEPRECATED CALCIDIOL+CALCIFEROL SERPL-MC: 39 UG/L (ref 30–80)

## 2021-10-24 DIAGNOSIS — Z79.899 HIGH RISK MEDICATION USE: ICD-10-CM

## 2021-10-24 DIAGNOSIS — M05.79 SEROPOSITIVE RHEUMATOID ARTHRITIS OF MULTIPLE SITES (H): ICD-10-CM

## 2021-10-24 DIAGNOSIS — I10 ESSENTIAL HYPERTENSION: ICD-10-CM

## 2021-10-25 RX ORDER — FOLIC ACID 1 MG/1
TABLET ORAL
Qty: 90 TABLET | Refills: 1 | Status: SHIPPED | OUTPATIENT
Start: 2021-10-25 | End: 2022-02-17

## 2021-10-26 RX ORDER — LISINOPRIL 20 MG/1
20 TABLET ORAL DAILY
Qty: 90 TABLET | Refills: 3 | Status: SHIPPED | OUTPATIENT
Start: 2021-10-26 | End: 2022-06-01

## 2021-10-26 RX ORDER — HYDROCHLOROTHIAZIDE 25 MG/1
25 TABLET ORAL DAILY
Qty: 90 TABLET | Refills: 3 | Status: SHIPPED | OUTPATIENT
Start: 2021-10-26 | End: 2022-06-01

## 2021-10-26 NOTE — TELEPHONE ENCOUNTER
"Last Written Prescription Date:  10/18/20  Last Fill Quantity: 90,  # refills: 3   Last office visit provider:  10/5/21     Requested Prescriptions   Pending Prescriptions Disp Refills     hydrochlorothiazide (HYDRODIURIL) 25 MG tablet [Pharmacy Med Name: hydroCHLOROthiazide 25 MG Oral Tablet] 90 tablet 3     Sig: TAKE 1 TABLET BY MOUTH  DAILY       Diuretics (Including Combos) Protocol Passed - 10/24/2021  7:13 PM        Passed - Blood pressure under 140/90 in past 12 months     BP Readings from Last 3 Encounters:   10/05/21 120/73   06/03/21 138/84   10/01/20 128/76                 Passed - Recent (12 mo) or future (30 days) visit within the authorizing provider's specialty     Patient has had an office visit with the authorizing provider or a provider within the authorizing providers department within the previous 12 mos or has a future within next 30 days. See \"Patient Info\" tab in inbasket, or \"Choose Columns\" in Meds & Orders section of the refill encounter.              Passed - Medication is active on med list        Passed - Patient is age 18 or older        Passed - No active pregancy on record        Passed - Normal serum creatinine on file in past 12 months     Recent Labs   Lab Test 10/08/21  0841   CR 0.89              Passed - Normal serum potassium on file in past 12 months     Recent Labs   Lab Test 10/08/21  0841   POTASSIUM 4.0                    Passed - Normal serum sodium on file in past 12 months     Recent Labs   Lab Test 10/08/21  0841                 Passed - No positive pregnancy test in past 12 months           lisinopril (ZESTRIL) 20 MG tablet [Pharmacy Med Name: Lisinopril 20 MG Oral Tablet] 90 tablet 3     Sig: TAKE 1 TABLET BY MOUTH  DAILY       ACE Inhibitors (Including Combos) Protocol Passed - 10/24/2021  7:13 PM        Passed - Blood pressure under 140/90 in past 12 months     BP Readings from Last 3 Encounters:   10/05/21 120/73   06/03/21 138/84   10/01/20 128/76          " "       Passed - Recent (12 mo) or future (30 days) visit within the authorizing provider's specialty     Patient has had an office visit with the authorizing provider or a provider within the authorizing providers department within the previous 12 mos or has a future within next 30 days. See \"Patient Info\" tab in inbasket, or \"Choose Columns\" in Meds & Orders section of the refill encounter.              Passed - Medication is active on med list        Passed - Patient is age 18 or older        Passed - No active pregnancy on record        Passed - Normal serum creatinine on file in past 12 months     Recent Labs   Lab Test 10/08/21  0841   CR 0.89       Ok to refill medication if creatinine is low          Passed - Normal serum potassium on file in past 12 months     Recent Labs   Lab Test 10/08/21  0841   POTASSIUM 4.0             Passed - No positive pregnancy test within past 12 months             Doni Lo RN 10/26/21 8:27 AM  "

## 2021-11-09 ENCOUNTER — LAB (OUTPATIENT)
Dept: LAB | Facility: CLINIC | Age: 56
End: 2021-11-09
Payer: COMMERCIAL

## 2021-11-09 DIAGNOSIS — M05.79 SEROPOSITIVE RHEUMATOID ARTHRITIS OF MULTIPLE SITES (H): ICD-10-CM

## 2021-11-09 LAB
ALBUMIN SERPL-MCNC: 4.2 G/DL (ref 3.5–5)
ALT SERPL W P-5'-P-CCNC: 20 U/L (ref 0–45)
CREAT SERPL-MCNC: 0.97 MG/DL (ref 0.6–1.1)
ERYTHROCYTE [DISTWIDTH] IN BLOOD BY AUTOMATED COUNT: 13.1 % (ref 10–15)
GFR SERPL CREATININE-BSD FRML MDRD: 65 ML/MIN/1.73M2
HCT VFR BLD AUTO: 40.9 % (ref 35–47)
HGB BLD-MCNC: 13.4 G/DL (ref 11.7–15.7)
MCH RBC QN AUTO: 31.2 PG (ref 26.5–33)
MCHC RBC AUTO-ENTMCNC: 32.8 G/DL (ref 31.5–36.5)
MCV RBC AUTO: 95 FL (ref 78–100)
PLATELET # BLD AUTO: 296 10E3/UL (ref 150–450)
RBC # BLD AUTO: 4.3 10E6/UL (ref 3.8–5.2)
WBC # BLD AUTO: 5.9 10E3/UL (ref 4–11)

## 2021-11-09 PROCEDURE — 85027 COMPLETE CBC AUTOMATED: CPT

## 2021-11-09 PROCEDURE — 82565 ASSAY OF CREATININE: CPT

## 2021-11-09 PROCEDURE — 84460 ALANINE AMINO (ALT) (SGPT): CPT

## 2021-11-09 PROCEDURE — 36415 COLL VENOUS BLD VENIPUNCTURE: CPT

## 2021-11-09 PROCEDURE — 82040 ASSAY OF SERUM ALBUMIN: CPT

## 2021-11-23 ENCOUNTER — MYC REFILL (OUTPATIENT)
Dept: RHEUMATOLOGY | Facility: CLINIC | Age: 56
End: 2021-11-23
Payer: COMMERCIAL

## 2021-11-23 DIAGNOSIS — M05.79 SEROPOSITIVE RHEUMATOID ARTHRITIS OF MULTIPLE SITES (H): ICD-10-CM

## 2021-11-23 RX ORDER — SULFASALAZINE 500 MG/1
1000 TABLET ORAL 2 TIMES DAILY
Qty: 360 TABLET | Refills: 0 | Status: SHIPPED | OUTPATIENT
Start: 2021-11-23 | End: 2022-02-17

## 2021-11-23 RX ORDER — HYDROXYCHLOROQUINE SULFATE 200 MG/1
200 TABLET, FILM COATED ORAL 2 TIMES DAILY WITH MEALS
Qty: 180 TABLET | Refills: 0 | Status: SHIPPED | OUTPATIENT
Start: 2021-11-23 | End: 2022-02-17

## 2021-12-07 ENCOUNTER — VIRTUAL VISIT (OUTPATIENT)
Dept: SLEEP MEDICINE | Facility: CLINIC | Age: 56
End: 2021-12-07
Attending: INTERNAL MEDICINE
Payer: COMMERCIAL

## 2021-12-07 DIAGNOSIS — R40.0 DAYTIME SLEEPINESS: ICD-10-CM

## 2021-12-07 DIAGNOSIS — G47.00 INSOMNIA, UNSPECIFIED TYPE: ICD-10-CM

## 2021-12-07 DIAGNOSIS — R06.83 SNORING: Primary | ICD-10-CM

## 2021-12-07 DIAGNOSIS — I10 ESSENTIAL HYPERTENSION: ICD-10-CM

## 2021-12-07 PROCEDURE — 99205 OFFICE O/P NEW HI 60 MIN: CPT | Mod: GT | Performed by: PHYSICIAN ASSISTANT

## 2021-12-07 ASSESSMENT — SLEEP AND FATIGUE QUESTIONNAIRES
HOW LIKELY ARE YOU TO NOD OFF OR FALL ASLEEP WHEN YOU ARE A PASSENGER IN A CAR FOR AN HOUR WITHOUT A BREAK: HIGH CHANCE OF DOZING
HOW LIKELY ARE YOU TO NOD OFF OR FALL ASLEEP IN A CAR, WHILE STOPPED FOR A FEW MINUTES IN TRAFFIC: WOULD NEVER DOZE
HOW LIKELY ARE YOU TO NOD OFF OR FALL ASLEEP WHILE WATCHING TV: WOULD NEVER DOZE
HOW LIKELY ARE YOU TO NOD OFF OR FALL ASLEEP WHILE SITTING AND READING: MODERATE CHANCE OF DOZING
HOW LIKELY ARE YOU TO NOD OFF OR FALL ASLEEP WHILE SITTING QUIETLY AFTER LUNCH WITHOUT ALCOHOL: MODERATE CHANCE OF DOZING
HOW LIKELY ARE YOU TO NOD OFF OR FALL ASLEEP WHILE SITTING AND TALKING TO SOMEONE: WOULD NEVER DOZE
HOW LIKELY ARE YOU TO NOD OFF OR FALL ASLEEP WHILE SITTING INACTIVE IN A PUBLIC PLACE: WOULD NEVER DOZE
HOW LIKELY ARE YOU TO NOD OFF OR FALL ASLEEP WHILE LYING DOWN TO REST IN THE AFTERNOON WHEN CIRCUMSTANCES PERMIT: HIGH CHANCE OF DOZING

## 2021-12-07 NOTE — PATIENT INSTRUCTIONS
"      MY TREATMENT INFORMATION FOR SLEEP APNEA-  Isabela STRATTON Voit    Am I having a home sleep study?  --->Watch the video for the device you are using:    -/drop off device-   https://www.[employer].com/watch?v=yGGFBdELGhk    Frequently asked questions:  1. What is Obstructive Sleep Apnea (YUVAL)? YUVAL is the most common type of sleep apnea. Apnea means, \"without breath.\"  Apnea is most often caused by narrowing or collapse of the upper airway as muscles relax during sleep.   Almost everyone has occasional apneas. Most people with sleep apnea have had brief interruptions at night frequently for many years.  The severity of sleep apnea is related to how frequent and severe the events are.   2. What are the consequences of YUVAL? Symptoms include: feeling sleepy during the day, snoring loudly, gasping or stopping of breathing, trouble sleeping, and occasionally morning headaches or heartburn at night.  Sleepiness can be serious and even increase the risk of falling asleep while driving. Other health consequences may include development of high blood pressure and other cardiovascular disease in persons who are susceptible. Untreated YUVAL  can contribute to heart disease, stroke and diabetes.   3. What are the treatment options? In most situations, sleep apnea is a lifelong disease that must be managed with daily therapy. Medications are not effective for sleep apnea and surgery is generally not considered until other therapies have been tried. Your treatment is your choice . Continuous Positive Airway (CPAP) works right away and is the therapy that is effective in nearly everyone. An oral device to hold your jaw forward is usually the next most reliable option. Other options include postioning devices (to keep you off your back), weight loss, and surgery including a tongue pacing device. There is more detail about some of these options below.  4. Are my sleep studies covered by insurance? Although we will request verification " of coverage, we advise you also check in advance of the study to ensure there is coverage.    Important tips for those choosing CPAP and similar devices   Know your equipment:  CPAP is continuous positive airway pressure that prevents obstructive sleep apnea by keeping the throat from collapsing while you are sleeping. In most cases, the device is  smart  and can slowly self-adjusts if your throat collapses and keeps a record every day of how well you are treated-this information is available to you and your care team.  BPAP is bilevel positive airway pressure that keeps your throat open and also assists each breath with a pressure boost to maintain adequate breathing.  Special kinds of BPAP are used in patients who have inadequate breathing from lung or heart disease. In most cases, the device is  smart  and can slowly self-adjusts to assist breathing. Like CPAP, the device keeps a record of how well you are treated.  Your mask is your connection to the device. You get to choose what feels most comfortable and the staff will help to make sure if fits. Here: are some examples of the different masks that are available:       Key points to remember on your journey with sleep apnea:  1. Sleep study.  PAP devices often need to be adjusted during a sleep study to show that they are effective and adjusted right.  2. Good tips to remember: Try wearing just the mask during a quiet time during the day so your body adapts to wearing it. A humidifier is recommended for comfort in most cases to prevent drying of your nose and throat. Allergy medication from your provider may help you if you are having nasal congestion.  3. Getting settled-in. It takes more than one night for most of us to get used to wearing a mask. Try wearing just the mask during a quiet time during the day so your body adapts to wearing it. A humidifier is recommended for comfort in most cases. Our team will work with you carefully on the first day and will  be in contact within 4 days and again at 2 and 4 weeks for advice and remote device adjustments. Your therapy is evaluated by the device each day.   4. Use it every night. The more you are able to sleep naturally for 7-8 hours, the more likely you will have good sleep and to prevent health risks or symptoms from sleep apnea. Even if you use it 4 hours it helps. Occasionally all of us are unable to use a medical therapy, in sleep apnea, it is not dangerous to miss one night.   5. Communicate. Call our skilled team on the number provided on the first day if your visit for problems that make it difficult to wear the device. Over 2 out of 3 patients can learn to wear the device long-term with help from our team. Remember to call our team or your sleep providers if you are unable to wear the device as we may have other solutions for those who cannot adapt to mask CPAP therapy. It is recommended that you sleep your sleep provider within the first 3 months and yearly after that if you are not having problems.   6. Use it for your health. We encourage use of CPAP masks during daytime quiet periods to allow your face and brain to adapt to the sensation of CPAP so that it will be a more natural sensation to awaken to at night or during naps. This can be very useful during the first few weeks or months of adapting to CPAP though it does not help medically to wear CPAP during wakefulness and  should not be used as a strategy just to meet guidelines.  7. Take care of your equipment. Make sure you clean your mask and tubing using directions every day and that your filter and mask are replaced as recommended or if they are not working.     BESIDES CPAP, WHAT OTHER THERAPIES ARE THERE?    Positioning Device  Positioning devices are generally used when sleep apnea is mild and only occurs on your back.This example shows a pillow that straps around the waist. It may be appropriate for those whose sleep study shows milder sleep apnea  that occurs primarily when lying flat on one's back. Preliminary studies have shown benefit but effectiveness at home may need to be verified by a home sleep test. These devices are generally not covered by medical insurance.  Examples of devices that maintain sleeping on the back to prevent snoring and mild sleep apnea.    Belt type body positioner  http://[employer]/    Electronic reminder  http://nightshifttherapy.com/  http://www.[employer].au/      Oral Appliance  What is oral appliance therapy?  An oral appliance device fits on your teeth at night like a retainer used after having braces. The device is made by a specialized dentist and requires several visits over 1-2 months before a manufactured device is made to fit your teeth and is adjusted to prevent your sleep apnea. Once an oral device is working properly, snoring should be improved. A home sleep test may be recommended at that time if to determine whether the sleep apnea is adequately treated.       Some things to remember:  -Oral devices are often, but not always, covered by your medical insurance. Be sure to check with your insurance provider.   -If you are referred for oral therapy, you will be given a list of specialized dentists to consider or you may choose to visit the Web site of the American Academy of Dental Sleep Medicine  -Oral devices are less likely to work if you have severe sleep apnea or are extremely overweight.     More detailed information  An oral appliance is a small acrylic device that fits over the upper and lower teeth  (similar to a retainer or a mouth guard). This device slightly moves jaw forward, which moves the base of the tongue forward, opens the airway, improves breathing for effective treat snoring and obstructive sleep apnea in perhaps 7 out of 10 people .  The best working devices are custom-made by a dental device  after a mold is made of the teeth 1, 2, 3.  When is an oral appliance indicated?  Oral  appliance therapy is recommended as a first-line treatment for patients with primary snoring, mild sleep apnea, and for patients with moderate sleep apnea who prefer appliance therapy to use of CPAP4, 5. Severity of sleep apnea is determined by sleep testing and is based on the number of respiratory events per hour of sleep.   How successful is oral appliance therapy?  The success rate of oral appliance therapy in patients with mild sleep apnea is 75-80% while in patients with moderate sleep apnea it is 50-70%. The chance of success in patients with severe sleep apnea is 40-50%. The research also shows that oral appliances have a beneficial effect on the cardiovascular health of YUVAL patients at the same magnitude as CPAP therapy7.  Oral appliances should be a second-line treatment in cases of severe sleep apnea, but if not completely successful then a combination therapy utilizing CPAP plus oral appliance therapy may be effective. Oral appliances tend to be effective in a broad range of patients although studies show that the patients who have the highest success are females, younger patients, those with milder disease, and less severe obesity. 3, 6.   Finding a dentist that practices dental sleep medicine  Specific training is available through the American Academy of Dental Sleep Medicine for dentists interested in working in the field of sleep. To find a dentist who is educated in the field of sleep and the use of oral appliances, near you, visit the Web site of the American Academy of Dental Sleep Medicine.    References  1. Joy et al. Objectively measured vs self-reported compliance during oral appliance therapy for sleep-disordered breathing. Chest 2013; 144(5): 4300-2865.  2. Amaya et al. Objective measurement of compliance during oral appliance therapy for sleep-disordered breathing. Thorax 2013; 68(1): 91-96.  3. Tracy et al. Mandibular advancement devices in 620 men and women with YUVAL  and snoring: tolerability and predictors of treatment success. Chest 2004; 125: 4933-8009.  4. Edward et al. Oral appliances for snoring and YUVAL: a review. Sleep 2006; 29: 244-262.  5. Huber et al. Oral appliance treatment for YUVAL: an update. J Clin Sleep Med 2014; 10(2): 215-227.  6. Rosario et al. Predictors of OSAH treatment outcome. J Dent Res 2007; 86: 6556-3828.      Weight Loss:    Weight loss is a long-term strategy that may improve sleep apnea in some patients.    Weight management is a personal decision and the decision should be based on your interest and the potential benefits.  If you are interested in exploring weight loss strategies, the following discussion covers the impact on weight loss on sleep apnea and the approaches that may be successful.    Being overweight does not necessarily mean you will have health consequences.  Those who have BMI over 35 or over 27 with existing medical conditions carries greater risk.   Weight loss decreases severity of sleep apnea in most people with obesity. For those with mild obesity who have developed snoring with weight gain, even 15-30 pound weight loss can improve and occasionally eliminate sleep apnea.  Structured and life-long dietary and health habits are necessary to lose weight and keep healthier weight levels.     Though there may be significant health benefits from weight loss, long-term weight loss is very difficult to achieve- studies show success with dietary management in less than 10% of people. In addition, substantial weight loss may require years of dietary control and may be difficult if patients have severe obesity. In these cases, surgical management may be considered.  Finally, older individuals who have tolerated obesity without health complications may be less likely to benefit from weight loss strategies.        Your BMI is There is no height or weight on file to calculate BMI.  Weight management is a personal decision.  If you  are interested in exploring weight loss strategies, the following discussion covers the approaches that may be successful. Body mass index (BMI) is one way to tell whether you are at a healthy weight, overweight, or obese. It measures your weight in relation to your height.  A BMI of 18.5 to 24.9 is in the healthy range. A person with a BMI of 25 to 29.9 is considered overweight, and someone with a BMI of 30 or greater is considered obese. More than two-thirds of American adults are considered overweight or obese.  Being overweight or obese increases the risk for further weight gain. Excess weight may lead to heart disease and diabetes.  Creating and following plans for healthy eating and physical activity may help you improve your health.  Weight control is part of healthy lifestyle and includes exercise, emotional health, and healthy eating habits. Careful eating habits lifelong are the mainstay of weight control. Though there are significant health benefits from weight loss, long-term weight loss with diet alone may be very difficult to achieve- studies show long-term success with dietary management in less than 10% of people. Attaining a healthy weight may be especially difficult to achieve in those with severe obesity. In some cases, medications, devices and surgical management might be considered.  What can you do?  If you are overweight or obese and are interested in methods for weight loss, you should discuss this with your provider.     Consider reducing daily calorie intake by 500 calories.     Keep a food journal.     Avoiding skipping meals, consider cutting portions instead.    Diet combined with exercise helps maintain muscle while optimizing fat loss. Strength training is particularly important for building and maintaining muscle mass. Exercise helps reduce stress, increase energy, and improves fitness. Increasing exercise without diet control, however, may not burn enough calories to loose weight.        Start walking three days a week 10-20 minutes at a time    Work towards walking thirty minutes five days a week     Eventually, increase the speed of your walking for 1-2 minutes at time    In addition, we recommend that you review healthy lifestyles and methods for weight loss available through the National Institutes of Health patient information sites:  http://win.niddk.nih.gov/publications/index.htm    And look into health and wellness programs that may be available through your health insurance provider, employer, local community center, or larry club.    Weight management plan: Patient was referred to their PCP to discuss a diet and exercise plan.  Surgery:    Surgery for obstructive sleep apnea is considered generally only when other therapies fail to work. Surgery may be discussed with you if you are having a difficult time tolerating CPAP and or when there is an abnormal structure that requires surgical correction.  Nose and throat surgeries often enlarge the airway to prevent collapse.  Most of these surgeries create pain for 1-2 weeks and up to half of the most common surgeries are not effective throughout life.  You should carefully discuss the benefits and drawbacks to surgery with your sleep provider and surgeon to determine if it is the best solution for you.   More information  Surgery for YUVAL is directed at areas that are responsible for narrowing or complete obstruction of the airway during sleep.  There are a wide range of procedures available to enlarge and/or stabilize the airway to prevent blockage of breathing in the three major areas where it can occur: the palate, tongue, and nasal regions.  Successful surgical treatment depends on the accurate identification of the factors responsible for obstructive sleep apnea in each person.  A personalized approach is required because there is no single treatment that works well for everyone.  Because of anatomic variation, consultation with an  examination by a sleep surgeon is a critical first step in determining what surgical options are best for each patient.  In some cases, examination during sedation may be recommended in order to guide the selection of procedures.  Patients will be counseled about risks and benefits as well as the typical recovery course after surgery. Surgery is typically not a cure for a person s YUVAL.  However, surgery will often significantly improve one s YUVAL severity (termed  success rate ).  Even in the absence of a cure, surgery will decrease the cardiovascular risk associated with OSA7; improve overall quality of life8 (sleepiness, functionality, sleep quality, etc).      Palate Procedures:  Patients with YUVAL often have narrowing of their airway in the region of their tonsils and uvula.  The goals of palate procedures are to widen the airway in this region as well as to help the tissues resist collapse.  Modern palate procedure techniques focus on tissue conservation and soft tissue rearrangement, rather than tissue removal.  Often the uvula is preserved in this procedure. Residual sleep apnea is common in patient after pharyngoplasty with an average reduction in sleep apnea events of 33%2.      Tongue Procedures:  ExamWhile patients are awake, the muscles that surround the throat are active and keep this region open for breathing. These muscles relax during sleep, allowing the tongue and other structures to collapse and block breathing.  There are several different tongue procedures available.  Selection of a tongue base procedure depends on characteristics seen on physical exam.  Generally, procedures are aimed at removing bulky tissues in this area or preventing the back of the tongue from falling back during sleep.  Success rates for tongue surgery range from 50-62%3.    Hypoglossal Nerve Stimulation:  Hypoglossal nerve stimulation has recently received approval from the [place] Food and Drug Administration for the  treatment of obstructive sleep apnea.  This is based on research showing that the system was safe and effective in treating sleep apnea6.  Results showed that the median AHI score decreased 68%, from 29.3 to 9.0. This therapy uses an implant system that senses breathing patterns and delivers mild stimulation to airway muscles, which keeps the airway open during sleep.  The system consists of three fully implanted components: a small generator (similar in size to a pacemaker), a breathing sensor, and a stimulation lead.  Using a small handheld remote, a patient turns the therapy on before bed and off upon awakening.    Candidates for this device must be greater than 22 years of age, have moderate to severe YUVAL (AHI between 20-65), BMI less than 32, have tried CPAP/oral appliance without success, and have appropriate upper airway anatomy (determined by a sleep endoscopy performed by Dr. Rivera).    Hypoglossal Nerve Stimulation Pathway:    The sleep surgeon s office will work with the patient through the insurance prior-authorization process (including communications and appeals).    Nasal Procedures:  Nasal obstruction can interfere with nasal breathing during the day and night.  Studies have shown that relief of nasal obstruction can improve the ability of some patients to tolerate positive airway pressure therapy for obstructive sleep apnea1.  Treatment options include medications such as nasal saline, topical corticosteroid and antihistamine sprays, and oral medications such as antihistamines or decongestants. Non-surgical treatments can include external nasal dilators for selected patients. If these are not successful by themselves, surgery can improve the nasal airway either alone or in combination with these other options.      Combination Procedures:  Combination of surgical procedures and other treatments may be recommended, particularly if patients have more than one area of narrowing or persistent positional  disease.  The success rate of combination surgery ranges from 66-80%2,3.    References  1. Melecio OWUSU. The Role of the Nose in Snoring and Obstructive Sleep Apnoea: An Update.  Eur Arch Otorhinolaryngol. 2011; 268: 1365-73.  2.  Lonnie SM; Elliott JA; Diego JR; Pallanch JF; Tavia MB; Fer SG; Mckenzie SINGH. Surgical modifications of the upper airway for obstructive sleep apnea in adults: a systematic review and meta-analysis. SLEEP 2010;33(10):9275-3885. Celeste HAIRSTON. Hypopharyngeal surgery in obstructive sleep apnea: an evidence-based medicine review.  Arch Otolaryngol Head Neck Surg. 2006 Feb;132(2):206-13.  3. Saad YH1, Dez Y, Juan M BRANT. The efficacy of anatomically based multilevel surgery for obstructive sleep apnea. Otolaryngol Head Neck Surg. 2003 Oct;129(4):327-35.  4. Celeste HAIRSTON, Goldberg A. Hypopharyngeal Surgery in Obstructive Sleep Apnea: An Evidence-Based Medicine Review. Arch Otolaryngol Head Neck Surg. 2006 Feb;132(2):206-13.  5. Neha PJ et al. Upper-Airway Stimulation for Obstructive Sleep Apnea.  N Engl J Med. 2014 Jan 9;370(2):139-49.  6. Manuel Y et al. Increased Incidence of Cardiovascular Disease in Middle-aged Men with Obstructive Sleep Apnea. Am J Respir Crit Care Med; 2002 166: 159-165  7. Guido EM et al. Studying Life Effects and Effectiveness of Palatopharyngoplasty (SLEEP) study: Subjective Outcomes of Isolated Uvulopalatopharyngoplasty. Otolaryngol Head Neck Surg. 2011; 144: 623-631.        WHAT IF I ONLY HAVE SNORING?      Mandibular advancement devices, lateral sleep positioning, long-term weight loss and treatment of nasal allergies has been shown to improve snoring.    Exercising tongue muscles with a game (https://www.ncbi.nlm.nih.gov/pubmed/55938836) or stimulating the tongue during the day with a device (https://doi.org/10.3390/rtl35665614) have t snoring    Remember to Drive Safe... Drive Alive     Sleep health profoundly affects your health, mood, and your safety.  Thirty  three percent of the population (one in three of us) is not getting enough sleep and many have a sleep disorder. Not getting enough sleep or having an untreated / undertreated sleep condition may make us sleepy without even knowing it. In fact, our driving could be dramatically impaired due to our sleep health. As your provider, here are some things I would like you to know about driving:     Here are some warning signs for impairment and dangerous drowsy driving:              -Having been awake more than 16 hours               -Looking tired               -Eyelid drooping              -Head nodding (it could be too late at this point)              -Driving for more than 30 minutes     Some things you could do to make the driving safer if you are experiencing some drowsiness:              -Stop driving and rest              -Call for transportation              -Make sure your sleep disorder is adequately treated     Some things that have been shown NOT to work when experiencing drowsiness while driving:              -Turning on the radio              -Opening windows              -Eating any  distracting  /  entertaining  foods (e.g., sunflower seeds, candy, or any other)              -Talking on the phone      Your decision may not only impact your life, but also the life of others. Please, remember to drive safe for yourself and all of us.          Insomnia and Behavioral Sleep Medicine Program    [facility] Insomnia and Behavioral Sleep Medicine Program provides non-drug treatment for sleep problems including:      Cognitive-behavioral Therapies for Insomnia (CBT-I)    Management of Shift-work and Jet Lag    Management of Delayed, Advanced and Irregular Circadian Rhythm Sleep Disorders    Imagery Rehearsal Therapy (IRT) for Nightmare Disorder    PAP Therapy Desensitization    You have been referred for consultation with a sleep psychologist who specializes in behavioral sleep medicine and treatment of  insomnia.  The [facility] Insomnia and Behavioral Sleep Medicine Program offers individualized telehealth services through our [facility] and online CBT-I.    Preparing for your Consultation:    You will need to keep a Sleep Diary for at least a week prior to your visit.  Simply download he CBTi- macario on your mobile devise and enter your sleep estimates in the My Sleep section each day.  You can also use the paper sleep diary provided. Your estimates should be your recollection of your last night's sleep.  We recommend you DO watch the clock or gather data during the night.      OR        Make sure to have your CBTI  Macario or paper sleep diary data available to review during consultation with your sleep provider.  You can also e-mail your sleep diary information to [email or web address] or fax it to [phone].    Frequently Asked Questions    What is CBT-I?    Cognitive Behavioral Therapy for Insomnia, also known as CBT-I, is a highly effective non-drug treatment for insomnia. The American College of Physicians recommends CBT-I as the first treatment for chronic insomnia.  Research has shown CBT-I to be safer and more effective long term than sleeping pills.    What does CBT-I involve?     CBT-I targets behaviors that lead to chronic insomnia:    Habits that weaken the bed as a cue for sleep    Habits that weaken your body's sleep drive and sleep/wake clock     Unhelpful sleep thoughts that increase sleep-related worry and arousal.    The process works like a 4-6 session training program that provides you with the information and coaching needed to implement proven strategies to get a better night's sleep.    People often see improvement in their sleep within a few weeks. Research shows if you keep practicing the skills you learn your sleep is likely to continue to improve 6-12 months after treatment.    Does this program prescribe or manage sleep medication?    No.  Your  prescribing provider is responsible to assist you in managing your sleep medications.  Some people choose to stop using sleep medication prior to or during CBT-I.  Our program can work with your prescribing provider to help reduce or eliminate use of sleep medications.     Can I Get Started Today?    Yes, you Can!  With our Online CBT-I program.     Research indicates that online CBT-I can be as effective as in-person therapy for motivated patients. It requires comfort with online learning and confidence that making changes in sleep habits can improve your sleep. Different from other online CBT-I programs, our insomnia program incorporates virtual visit follow-up. Our online CBT-I program is formatted for use on computers and mobile devices.  The cost for an entire 6 week program is $40.           Online CBT-I is not for everyone.  Contraindications include individuals with seizure disorders, bipolar disorder, unstable medical or mental health conditions, risk of falling, or are pregnant.    If you opt to try online CBT-I, we ask that you sign a consent for the [facility] to share your online CBT-I information with our sleep program.  To optimize care coordination, we ask that you also agree to share data from your daily sleep diaries. This can be done by entering the sharing code Druva.    To get started, copy and past the link below which will take you to the landing page to register:            [email or web address]                                 Are there any recommended self-help workbooks?    Yes!  We recommend you consider:    Say Maciej to Insomnia:The Six-Week, Drug-Free Program Developed At [employer].  Charles Eagle MD. Available in paperback, David and audiobook.        Overcoming Insomnia: A Cognitive-Behavioral Therapy Approach, Workbook.  Jos Villalobos, PhD  and Melva Pham, PhD.  Available in paperback and David.        Quiet Your Mind and Get to Sleep:  Solutions to Insomnia for Those with Depression, Anxiety, or Chronic Pain.  Blanca Liz PhD and Melva Pham PhD.  Available in paperback and David

## 2021-12-07 NOTE — PROGRESS NOTES
Isabela is a 56 year old who is being evaluated via a billable video visit.      How would you like to obtain your AVS? MyChart  If the video visit is dropped, the invitation should be resent by: Send to e-mail at: [email or web address]  Will anyone else be joining your video visit? No      Video-Visit Details    Type of service:  Video Visit    Video Start Time: 11:06AM    Video End Time:11:46AM    Originating Location (pt. Location): Home    Distant Location (provider location):  [facility]     Platform used for Video Visit: Libox

## 2021-12-07 NOTE — PROGRESS NOTES
[facility]   Outpatient Sleep Medicine Consultation  Dec 7, 2021       Name: Isabela Olivo MRN# [id number]   Age: 56 year old YOB: 1965     Date of Consultation: Dec 7, 2021   Consultation is requested by: Emily Bustillo MD  [address] [place] [address] Emily Bustillo  Primary care provider: Emily Bustillo         Assessment and Plan:   1. Snoring  2. Daytime sleepiness  3. Hypertension  4. Insomnia, unspecified type    Patient is being evaluated for Obstructive Sleep Apnea (YUVAL).  Patient's risk factors for YUVAL include: snoring, excessive daytime sleepiness (ESS 10), obesity, high blood pressure, age >50. We discussed pathophysiology of YUVAL and testing with overnight attended polysomnography versus home sleep apnea testing. Home sleep testing is appropriate for this patient given lack of significant comorbidity and has been ordered today.   - HST-Home Sleep Apnea Test; Future    Briefly discussed potential treatment modalities in the event sleep apnea is identified on testing and additional information given in patient instructions. Will plan to discuss in more detail at next visit pending study results.     Patient also endorses sleep related anxiety/worry/arousal. Reprots she has struggled with insomnia for years on and off. Today her MÓNICA is subthreshold at 14. Has been on prescirption and OTC sleep aids in the past without much benefit. Discussed CBTI would be a great alternative to help her sleep related anxiety that is not a medication and patient was interested in more information today. Will plan to discuss firther at next visit, no referral placed today but will pending progress/study results.     Follow up 1-2 weeks following her study for review of results and to expedite care. Educational materials provided in instructions.       Chief Complaint / Reason for Sleep Consult:     Chief Complaint   Patient presents with     Video Visit     new patient           "History of Present Illness:     Isabela Olivo is a 56 year old female who presents to the clinic for evaluation of suspected sleep apnea. Other past medical history significant for obesity, allergies, asthma, hypothyroidism, hypertension, dyslipidemia, rheumatoid arthritis.     Patient presents with a many year history of snoring. Denies witnessed apneas. Denies any obvious gasp/choking arousals. Patient has a regular bed partner, occasionally sleep separately due to snoring but more so because of her husbands snoring than her snoring. Sleeps on side and stomach. Occasional nocturia x2.  New onset of nocturnal GERD that \"comes and goes\" over the past year. Reports morning headache 2-3 times per week that resolves after drinking water. Denies morning dry mouth.  No morning nasal congestion.     Describes herself as more of a night owl. Sleep schedule is the same on all days of the week. Goes to bed sometime between 12-1:00AM and awakens at 6:30-7:30AM without an alarm. Falls asleep in 15 minutes; denies difficulty falling asleep. Awakens 2 times a night for 5 minutes before falling back to sleep; awakens to go to the bathroom or uncertain reasons. Does endorse nighttime anxiety/rumination and sleep specific worry. Wakes in the morning stiff from her RA.     No napping during the week but does nap on weekends because \"it's the steven of my life to take a weekend nap\". Reports tiredness after lunch on weekdays but no inadvertent dozing. She had a total Gardners Sleepiness Scale of 10 (12/07/21 0758), with scores of 10 or higher indicating abnormal levels of sleepiness. Denies any history of falling asleep or dozing while driving. No accidents or near accidents. Patient was counseled on the importance of driving while alert.    Patient denies typical restless legs syndrome symptoms. No kicking/leg movements in sleep that she is aware of. No dream enactment behavior. Does admit to somniloquy.  No somnambulism. No sleep related " eating. No bruxism. No hypnagogic/hypnopompic hallucinations.  No sleep paralysis.    SCALES       SLEEP APNEA: Stopbang score  4/8       INSOMNIA:  Insomnia severity score: 14/28   absence of insomnia (0-7); sub-threshold insomnia (8-14); moderate insomnia (15-21); and severe insomnia (22-28)       SLEEPINESS: [place] sleepiness scale: 10/24 [normal < 10]          Medications:     Current Outpatient Medications   Medication Sig     Bioflavonoid Products (VITAMIN C) CHEW      cholecalciferol, vitamin D3, (CHOLECALCIFEROL) 1,000 unit tablet [CHOLECALCIFEROL, VITAMIN D3, (CHOLECALCIFEROL) 1,000 UNIT TABLET] Take 1,000 Units by mouth daily.     folic acid (FOLVITE) 1 MG tablet TAKE 1 TABLET BY MOUTH  DAILY     hydrochlorothiazide (HYDRODIURIL) 25 MG tablet Take 1 tablet (25 mg) by mouth daily     hydroxychloroquine (PLAQUENIL) 200 MG tablet Take 1 tablet (200 mg) by mouth 2 times daily (with meals)     levothyroxine (SYNTHROID, LEVOTHROID) 125 MCG tablet [LEVOTHYROXINE (SYNTHROID, LEVOTHROID) 125 MCG TABLET] TAKE 1 TABLET BY MOUTH  DAILY     lisinopril (ZESTRIL) 20 MG tablet Take 1 tablet (20 mg) by mouth daily     methotrexate 2.5 MG tablet Take 8 tablets (20 mg) by mouth every 7 days     sulfaSALAzine (AZULFIDINE) 500 MG tablet Take 2 tablets (1,000 mg) by mouth 2 times daily     No current facility-administered medications for this visit.           Allergies:     Allergies   Allergen Reactions     Dilantin Infatabs [Phenytoin] Unknown     Phenytoin Sodium Extended [Phenytoin] Hives          Past Medical History:     Patient Active Problem List    Diagnosis Date Noted     Morbid obesity (H) 10/05/2021     Priority: Medium     Asthma      Priority: Medium     Created by Conversion         Primary osteoarthritis involving multiple joints 11/01/2018     Priority: Medium     Dyslipidemia      Priority: Medium     Created by Conversion         Positive anti-CCP test 10/20/2016     Priority: Medium     Hypothyroidism       Priority: Medium     Created by Conversion  Replacement Utility updated for latest IMO load         Hypertension      Priority: Medium     Created by Conversion  Replacement Utility updated for latest IMO load         Vitamin D Deficiency      Priority: Medium     Created by Conversion  Replacement Utility updated for latest IMO load         Seropositive rheumatoid arthritis of multiple sites (H) 04/07/2016     Priority: Medium     High risk medication use 04/07/2016     Priority: Medium          Past Surgical History:    Previous upper airway surgery: denies   Past Surgical History:   Procedure Laterality Date     WI THYROID LOBECTOMY,UNILAT      Description: Thyroid Surgery Thyroid Lobectomy Right Lobe;  Proc Date: 02/05/2007;          Social History:     Social History     Tobacco Use     Smoking status: Never Smoker     Smokeless tobacco: Never Used   Substance Use Topics     Alcohol use: No     Chemical History:  Alcohol use: Denies  Tobacco use: Denies  Illicit substances: Denies  Caffeine intake: Drinks rare caffeine, rare Pepsi          Family History:     Family History   Problem Relation Age of Onset     Breast Cancer Mother 62.00     Hyperlipidemia Mother      Hypertension Mother      Diabetes Father      Hyperlipidemia Father      Hypertension Father      Sleep Apnea Father      Heart Disease Maternal Grandfather       Sleep Family Hx: Father with YUVAL on CPAP. Patient denies any known family history of restless legs syndrome, narcolepsy or other sleep disorders.          Review of Systems:   Answers for HPI/ROS submitted by the patient on 12/7/2021  General Symptoms: No  Skin Symptoms: No  HENT Symptoms: No  EYE SYMPTOMS: No  HEART SYMPTOMS: No  LUNG SYMPTOMS: No  INTESTINAL SYMPTOMS: No  URINARY SYMPTOMS: No  GYNECOLOGIC SYMPTOMS: No  BREAST SYMPTOMS: No  SKELETAL SYMPTOMS: No  BLOOD SYMPTOMS: No  NERVOUS SYSTEM SYMPTOMS: No  MENTAL HEALTH SYMPTOMS: No         Physical Examination:   There were no  "vitals taken for this visit.  Vitals - Patient Reported 12/7/2021   Height (Patient Reported) 5' 6\"   Weight (Patient Reported) 210 lb   BMI (Based on Pt Reported Ht/Wt) 33.89 kg/m2   General appearance: Awake, alert, cooperative. Well groomed. Sitting comfortably in chair. In no apparent distress.  HEENT: Head: Normocephalic, atraumatic. Eyes:Conjunctiva clear. Sclera normal. Nose: External appearance without deformity.   Cardiovascular: No JVD  Pulmonary:  Able to speak easily in full sentences. No cough or wheeze.   Skin:  No rashes or significant lesions on visible skin.   Neurologic: Alert, oriented x3.   Psychiatric: Mood euthymic. Affect congruent with full range and intensity.          Data: All pertinent previous laboratory data reviewed     No results found for: PH, PHARTERIAL, PO2, [id number], SAT, PCO2, HCO3, BASEEXCESS, DEMARIO, BEB  Lab Results   Component Value Date    TSH 2.34 10/08/2021    TSH 1.73 11/03/2020     Lab Results   Component Value Date     10/08/2021     11/03/2020     Lab Results   Component Value Date    HGB 13.4 11/09/2021    HGB 12.6 08/03/2021     Lab Results   Component Value Date    BUN 11 10/08/2021    BUN 16 11/03/2020    CR 0.97 11/09/2021    CR 0.89 10/08/2021     Lab Results   Component Value Date    AST 28 10/08/2021    AST 34 11/03/2020    ALT 20 11/09/2021    ALT 26 10/08/2021    ALKPHOS 59 10/08/2021    ALKPHOS 61 11/03/2020    BILITOTAL 0.6 10/08/2021    BILITOTAL 0.7 11/03/2020     No results found for: UAMP, UBARB, BENZODIAZEUR, UCANN, UCOC, OPIT, UPCP      Copy to: Emily Bustillo PA-C  Dec 7, 2021     [facility] [place]  [address] [place] [address]     [facility] [place]  [address], [place] [address]    Chart documentation was completed, in part, with GI-View voice-recognition software. Even though reviewed, some grammatical, spelling, and word errors may " remain.    63 minutes spent on day of encounter doing chart review, history and exam, documentation, and further activities as noted above

## 2021-12-16 ENCOUNTER — OFFICE VISIT (OUTPATIENT)
Dept: INTERNAL MEDICINE | Facility: CLINIC | Age: 56
End: 2021-12-16
Payer: COMMERCIAL

## 2021-12-16 VITALS
SYSTOLIC BLOOD PRESSURE: 134 MMHG | DIASTOLIC BLOOD PRESSURE: 82 MMHG | BODY MASS INDEX: 36.92 KG/M2 | OXYGEN SATURATION: 97 % | WEIGHT: 227 LBS | HEART RATE: 85 BPM

## 2021-12-16 DIAGNOSIS — L02.93 CARBUNCLE: Primary | ICD-10-CM

## 2021-12-16 PROCEDURE — 99213 OFFICE O/P EST LOW 20 MIN: CPT | Performed by: INTERNAL MEDICINE

## 2021-12-16 RX ORDER — DOXYCYCLINE 100 MG/1
100 CAPSULE ORAL 2 TIMES DAILY
Qty: 20 CAPSULE | Refills: 1 | Status: SHIPPED | OUTPATIENT
Start: 2021-12-16 | End: 2022-04-21

## 2021-12-16 NOTE — PROGRESS NOTES
Assessment/Plan:    Carbuncle in the pubic area drained [place] of red around it.  No prior history of tick bite.  Doxycycline to be prescribed 100 mg twice daily.  Asked patient to visit with her PCP Dr. NU cadet.    15 minutes spent on the date of the encounter doing chart review, patient visit and documentation     Subjective:  Isabela Olivo is a 56 year old female presents for the following health issues spontaneous drainage of carbuncle left pubic area felt ill with it.  Allergy Dilantin    ROS:  No blood in stool or urine no chest pain shortness of breath no rash otherwise.  No ECM.    Objective:  /82   Pulse 85   Wt 103 kg (227 lb)   SpO2 97%   BMI 36.92 kg/m    Resolving carbuncle without cellulitis or lymphangitis.

## 2022-01-24 DIAGNOSIS — E03.9 HYPOTHYROIDISM: ICD-10-CM

## 2022-01-26 RX ORDER — LEVOTHYROXINE SODIUM 125 UG/1
TABLET ORAL
Qty: 90 TABLET | Refills: 1 | Status: SHIPPED | OUTPATIENT
Start: 2022-01-26 | End: 2022-05-10

## 2022-01-26 NOTE — TELEPHONE ENCOUNTER
"Routing refill request to provider for review/approval because:  A break in medication    Last Written Prescription Date:  5/18/2021  Last Fill Quantity: 90,  # refills: 1   Last office visit provider:  12/16/2021 Dr. Shrestha     levothyroxine (SYNTHROID, LEVOTHROID) 125 MCG tablet 90 tablet 1 5/18/2021  No   Sig: TAKE 1 TABLET BY MOUTH  DAILY   Sent to pharmacy as: levothyroxine 125 mcg tablet (SYNTHROID, LEVOTHROID)   E-Prescribing Status: Receipt confirmed by pharmacy (5/18/2021  9:47 AM CDT       Requested Prescriptions   Pending Prescriptions Disp Refills     levothyroxine (SYNTHROID/LEVOTHROID) 125 MCG tablet [Pharmacy Med Name: Levothyroxine Sodium 125 MCG Oral Tablet] 90 tablet 1     Sig: TAKE 1 TABLET BY MOUTH  DAILY       Thyroid Protocol Passed - 1/24/2022  9:26 AM        Passed - Patient is 12 years or older        Passed - Recent (12 mo) or future (30 days) visit within the authorizing provider's specialty     Patient has had an office visit with the authorizing provider or a provider within the authorizing providers department within the previous 12 mos or has a future within next 30 days. See \"Patient Info\" tab in inbasket, or \"Choose Columns\" in Meds & Orders section of the refill encounter.              Passed - Medication is active on med list        Passed - Normal TSH on file in past 12 months     Recent Labs   Lab Test 10/08/21  0841   TSH 2.34              Passed - No active pregnancy on record     If patient is pregnant or has had a positive pregnancy test, please check TSH.          Passed - No positive pregnancy test in past 12 months     If patient is pregnant or has had a positive pregnancy test, please check TSH.               Keara Gotti RN 01/25/22 9:18 PM  "

## 2022-02-02 ENCOUNTER — OFFICE VISIT (OUTPATIENT)
Dept: SLEEP MEDICINE | Facility: CLINIC | Age: 57
End: 2022-02-02
Payer: COMMERCIAL

## 2022-02-02 DIAGNOSIS — G47.33 OSA (OBSTRUCTIVE SLEEP APNEA): ICD-10-CM

## 2022-02-02 DIAGNOSIS — G47.00 INSOMNIA, UNSPECIFIED TYPE: ICD-10-CM

## 2022-02-02 DIAGNOSIS — I10 ESSENTIAL HYPERTENSION: ICD-10-CM

## 2022-02-02 DIAGNOSIS — R40.0 DAYTIME SLEEPINESS: ICD-10-CM

## 2022-02-02 DIAGNOSIS — R06.83 SNORING: ICD-10-CM

## 2022-02-02 PROCEDURE — G0399 HOME SLEEP TEST/TYPE 3 PORTA: HCPCS | Performed by: INTERNAL MEDICINE

## 2022-02-03 ENCOUNTER — DOCUMENTATION ONLY (OUTPATIENT)
Dept: SLEEP MEDICINE | Facility: CLINIC | Age: 57
End: 2022-02-03
Payer: COMMERCIAL

## 2022-02-04 PROBLEM — G47.33 OSA (OBSTRUCTIVE SLEEP APNEA): Status: ACTIVE | Noted: 2022-02-04

## 2022-02-04 NOTE — PROCEDURES
"HOME SLEEP STUDY INTERPRETATION    Patient: Isabela Olivo  MRN: [id number]  YOB: 1965  Study Date: 2022  PCP/Referring Provider: Emily Bustillo;   Ordering Provider: Lisa Umanzor PA-C     Indications for Home Study: Isabela Olivo is a 56 year old female who presents with symptoms suggestive of obstructive sleep apnea.    Estimated body mass index is 36.92 kg/m  as calculated from the following:    Height as of 10/5/21: 1.67 m (5' 5.75\").    Weight as of 21: 103 kg (227 lb).  Total score - [place]: 10 (2021  7:58 AM)  STOP-BAN/8    Data: A full night home sleep study was performed recording the standard physiologic parameters including body position, movement, sound, nasal pressure, thermal oral airflow, chest and abdominal movements with respiratory inductance plethysmography, and oxygen saturation by pulse oximetry. Pulse rate was estimated by oximetry recording. This study was considered adequate based on > 4 hours of quality oximetry and respiratory recording. As specified by the AASM Manual for the Scoring of Sleep and Associated events, version 2.3, Rule VIII.D 1B, 4% oxygen desaturation scoring for hypopneas is used as a standard of care on all home sleep apnea testing.    Analysis Time:  488 minutes    Respiration:   Sleep Associated Hypoxemia: sustained hypoxemia was present. Baseline oxygen saturation was 93%.  Time with saturation less than or equal to 88% was 24.5 minutes. The lowest oxygen saturation was 81%.   Snoring: Snoring was present.  Respiratory events: The home study revealed a presence of 168 obstructive apneas and 17 mixed and central apneas. There were 102 hypopneas resulting in a combined apnea/hypopnea index [AHI] of 36.6 events per hour.  AHI was 64.5 per hour supine, - per hour prone, 35.9 per hour on left side, and 26.3 per hour on right side.   Pattern: Excluding events noted above, respiratory rate and pattern was Normal.    Position: Percent of time " spent: supine - 20.6%, prone - 0%, on left - 21.6%, on right - 54.2%.    Heart Rate: By pulse oximetry normal rate was noted.     Assessment:   Severe obstructive sleep apnea.  Sleep associated hypoxemia was present.    Recommendations:  CPAP therapy is recommended for severe obstructive sleep apnea. Consider auto-CPAP at 5-15 cmH2O.  If CPAP is not tolerated, secondary alternatives include oral appliance therapy or upper airway surgical evaluation.  Suggest optimizing sleep hygiene and avoiding sleep deprivation.  Weight management.    Diagnosis Code(s): Obstructive Sleep Apnea G47.33, Hypoxemia G47.36    Pardeep Butterfield MD, February 4, 2022   Diplomate, American Board of Psychiatry and Neurology, Sleep Medicine

## 2022-02-04 NOTE — PROGRESS NOTES
HST POST-STUDY QUESTIONNAIRE    1. What time did you go to bed?  11:30pm  2. How long do you think it took to fall asleep?  1 hour  3. What time did you wake up to start the day?  7am  4. Did you get up during the night at all?  yes  5. If you woke up, do you remember approximately what time(s)? 3am  6. Did you have any difficulty with the equipment?  Yes, I sometimes sleep on my stomach. So I kept waking up to move to side or readjust all the cabling  7. Did you us any type of treatment with this study?  None  8. Was the head of the bed elevated? No  9. Did you sleep in a recliner?  No  10. Did you stop using CPAP at least 3 days before this test?  NA  11. Any other information you'd like us to know? The equipment was uncomfortable for me, a light sleeper, so I hope you get  Something usable. It was not a typical night for me. Thank you!    This HSAT was performed using a Noxturnal T3 device which recorded snore, sound, movement activity, body position, nasal pressure, oronasal thermal airflow, pulse, oximetry and both chest and abdominal respiratory effort. HSAT data was restricted to the time patient states they were in bed.     HSAT was scored using 1B 4% hypopnea rule.     HST AHI (Non-PAT): 36.6  Snoring was reported as loud and none.  Time with SpO2 below 89% was 42.7 minutes.   Overall signal quality was good     Pt will follow up with sleep provider to determine appropriate therapy.

## 2022-02-06 ENCOUNTER — HEALTH MAINTENANCE LETTER (OUTPATIENT)
Age: 57
End: 2022-02-06

## 2022-02-08 DIAGNOSIS — J45.909 ASTHMA, UNSPECIFIED ASTHMA SEVERITY, UNSPECIFIED WHETHER COMPLICATED, UNSPECIFIED WHETHER PERSISTENT: Primary | ICD-10-CM

## 2022-02-12 ASSESSMENT — SLEEP AND FATIGUE QUESTIONNAIRES
HOW LIKELY ARE YOU TO NOD OFF OR FALL ASLEEP WHEN YOU ARE A PASSENGER IN A CAR FOR AN HOUR WITHOUT A BREAK: HIGH CHANCE OF DOZING
HOW LIKELY ARE YOU TO NOD OFF OR FALL ASLEEP WHILE SITTING QUIETLY AFTER LUNCH WITHOUT ALCOHOL: SLIGHT CHANCE OF DOZING
HOW LIKELY ARE YOU TO NOD OFF OR FALL ASLEEP WHILE SITTING INACTIVE IN A PUBLIC PLACE: WOULD NEVER DOZE
HOW LIKELY ARE YOU TO NOD OFF OR FALL ASLEEP IN A CAR, WHILE STOPPED FOR A FEW MINUTES IN TRAFFIC: WOULD NEVER DOZE
HOW LIKELY ARE YOU TO NOD OFF OR FALL ASLEEP WHILE WATCHING TV: SLIGHT CHANCE OF DOZING
HOW LIKELY ARE YOU TO NOD OFF OR FALL ASLEEP WHILE SITTING AND READING: MODERATE CHANCE OF DOZING
HOW LIKELY ARE YOU TO NOD OFF OR FALL ASLEEP WHILE LYING DOWN TO REST IN THE AFTERNOON WHEN CIRCUMSTANCES PERMIT: MODERATE CHANCE OF DOZING
HOW LIKELY ARE YOU TO NOD OFF OR FALL ASLEEP WHILE SITTING AND TALKING TO SOMEONE: WOULD NEVER DOZE

## 2022-02-15 ENCOUNTER — LAB (OUTPATIENT)
Dept: LAB | Facility: CLINIC | Age: 57
End: 2022-02-15

## 2022-02-15 ENCOUNTER — VIRTUAL VISIT (OUTPATIENT)
Dept: SLEEP MEDICINE | Facility: CLINIC | Age: 57
End: 2022-02-15
Payer: COMMERCIAL

## 2022-02-15 VITALS — HEIGHT: 66 IN | BODY MASS INDEX: 35.36 KG/M2 | WEIGHT: 220 LBS

## 2022-02-15 DIAGNOSIS — M05.79 SEROPOSITIVE RHEUMATOID ARTHRITIS OF MULTIPLE SITES (H): ICD-10-CM

## 2022-02-15 DIAGNOSIS — G47.34 NOCTURNAL HYPOXEMIA: ICD-10-CM

## 2022-02-15 DIAGNOSIS — G47.33 OSA (OBSTRUCTIVE SLEEP APNEA): Primary | ICD-10-CM

## 2022-02-15 LAB
ALBUMIN SERPL-MCNC: 4 G/DL (ref 3.5–5)
ALT SERPL W P-5'-P-CCNC: 17 U/L (ref 0–45)
CREAT SERPL-MCNC: 0.89 MG/DL (ref 0.6–1.1)
ERYTHROCYTE [DISTWIDTH] IN BLOOD BY AUTOMATED COUNT: 13.1 % (ref 10–15)
GFR SERPL CREATININE-BSD FRML MDRD: 75 ML/MIN/1.73M2
HCT VFR BLD AUTO: 40.9 % (ref 35–47)
HGB BLD-MCNC: 13.1 G/DL (ref 11.7–15.7)
MCH RBC QN AUTO: 30.7 PG (ref 26.5–33)
MCHC RBC AUTO-ENTMCNC: 32 G/DL (ref 31.5–36.5)
MCV RBC AUTO: 96 FL (ref 78–100)
PLATELET # BLD AUTO: 272 10E3/UL (ref 150–450)
RBC # BLD AUTO: 4.27 10E6/UL (ref 3.8–5.2)
WBC # BLD AUTO: 5.7 10E3/UL (ref 4–11)

## 2022-02-15 PROCEDURE — 84460 ALANINE AMINO (ALT) (SGPT): CPT

## 2022-02-15 PROCEDURE — 82565 ASSAY OF CREATININE: CPT

## 2022-02-15 PROCEDURE — 82040 ASSAY OF SERUM ALBUMIN: CPT

## 2022-02-15 PROCEDURE — 36415 COLL VENOUS BLD VENIPUNCTURE: CPT

## 2022-02-15 PROCEDURE — 99214 OFFICE O/P EST MOD 30 MIN: CPT | Mod: GT | Performed by: PHYSICIAN ASSISTANT

## 2022-02-15 PROCEDURE — 85027 COMPLETE CBC AUTOMATED: CPT

## 2022-02-15 ASSESSMENT — PAIN SCALES - GENERAL: PAINLEVEL: NO PAIN (0)

## 2022-02-15 ASSESSMENT — MIFFLIN-ST. JEOR: SCORE: 1594.66

## 2022-02-15 NOTE — PATIENT INSTRUCTIONS
"HOME SLEEP STUDY INTERPRETATION     Patient: Isabela Olivo  MRN: [id number]  YOB: 1965  Study Date: 2022  PCP/Referring Provider: Emily Bustillo;   Ordering Provider: Lisa Umanzor PA-C     Indications for Home Study: Isabela Olivo is a 56 year old female who presents with symptoms suggestive of obstructive sleep apnea.     Estimated body mass index is 36.92 kg/m  as calculated from the following:    Height as of 10/5/21: 1.67 m (5' 5.75\").    Weight as of 21: 103 kg (227 lb).  Total score - Halfway: 10 (2021  7:58 AM)  STOP-BAN/8     Data: A full night home sleep study was performed recording the standard physiologic parameters including body position, movement, sound, nasal pressure, thermal oral airflow, chest and abdominal movements with respiratory inductance plethysmography, and oxygen saturation by pulse oximetry. Pulse rate was estimated by oximetry recording. This study was considered adequate based on > 4 hours of quality oximetry and respiratory recording. As specified by the AASM Manual for the Scoring of Sleep and Associated events, version 2.3, Rule VIII.D 1B, 4% oxygen desaturation scoring for hypopneas is used as a standard of care on all home sleep apnea testing.     Analysis Time:  488 minutes     Respiration:   Sleep Associated Hypoxemia: sustained hypoxemia was present. Baseline oxygen saturation was 93%.  Time with saturation less than or equal to 88% was 24.5 minutes. The lowest oxygen saturation was 81%.   Snoring: Snoring was present.  Respiratory events: The home study revealed a presence of 168 obstructive apneas and 17 mixed and central apneas. There were 102 hypopneas resulting in a combined apnea/hypopnea index [AHI] of 36.6 events per hour.  AHI was 64.5 per hour supine, - per hour prone, 35.9 per hour on left side, and 26.3 per hour on right side.   Pattern: Excluding events noted above, respiratory rate and pattern was Normal.     Position: Percent of " time spent: supine - 20.6%, prone - 0%, on left - 21.6%, on right - 54.2%.     Heart Rate: By pulse oximetry normal rate was noted.      Assessment:   Severe obstructive sleep apnea.  Sleep associated hypoxemia was present.     Recommendations:  CPAP therapy is recommended for severe obstructive sleep apnea. Consider auto-CPAP at 5-15 cmH2O.  If CPAP is not tolerated, secondary alternatives include oral appliance therapy or upper airway surgical evaluation.  Suggest optimizing sleep hygiene and avoiding sleep deprivation.  Weight management.     Diagnosis Code(s): Obstructive Sleep Apnea G47.33, Hypoxemia G47.36     Pardeep Butterfield MD, February 4, 2022   Diplomate, American Board of Psychiatry and Neurology, Sleep Medicine    Your BMI is Body mass index is 35.51 kg/m .  Weight management is a personal decision.  If you are interested in exploring weight loss strategies, the following discussion covers the approaches that may be successful. Body mass index (BMI) is one way to tell whether you are at a healthy weight, overweight, or obese. It measures your weight in relation to your height.  A BMI of 18.5 to 24.9 is in the healthy range. A person with a BMI of 25 to 29.9 is considered overweight, and someone with a BMI of 30 or greater is considered obese. More than two-thirds of American adults are considered overweight or obese.  Being overweight or obese increases the risk for further weight gain. Excess weight may lead to heart disease and diabetes.  Creating and following plans for healthy eating and physical activity may help you improve your health.  Weight control is part of healthy lifestyle and includes exercise, emotional health, and healthy eating habits. Careful eating habits lifelong are the mainstay of weight control. Though there are significant health benefits from weight loss, long-term weight loss with diet alone may be very difficult to achieve- studies show long-term success with dietary management  in less than 10% of people. Attaining a healthy weight may be especially difficult to achieve in those with severe obesity. In some cases, medications, devices and surgical management might be considered.  What can you do?  If you are overweight or obese and are interested in methods for weight loss, you should discuss this with your provider.     Consider reducing daily calorie intake by 500 calories.     Keep a food journal.     Avoiding skipping meals, consider cutting portions instead.    Diet combined with exercise helps maintain muscle while optimizing fat loss. Strength training is particularly important for building and maintaining muscle mass. Exercise helps reduce stress, increase energy, and improves fitness. Increasing exercise without diet control, however, may not burn enough calories to loose weight.       Start walking three days a week 10-20 minutes at a time    Work towards walking thirty minutes five days a week     Eventually, increase the speed of your walking for 1-2 minutes at time    And look into health and wellness programs that may be available through your health insurance provider, employer, local community center, or larry club.

## 2022-02-15 NOTE — PROGRESS NOTES
Isabela is a 56 year old who is being evaluated via a billable video visit.      flushot on 10-5-21    How would you like to obtain your AVS? MyChart  If the video visit is dropped, the invitation should be resent by: Text to cell phone: [phone]  Will anyone else be joining your video visit? No      Video-Visit Details    Type of service:  Video Visit    Video Start Time: 10:34AM    Video End Time:11:06AM    Originating Location (pt. Location): Home    Distant Location (provider location):  [facility]     Platform used for Video Visit: [employer]

## 2022-02-15 NOTE — PROGRESS NOTES
[facility]   Outpatient Sleep Medicine  Feb 15, 2022       Name: Isabela Olivo MRN# [id number]   Age: 56 year old YOB: 1965            Assessment and Plan:   1. YUVAL (obstructive sleep apnea)  2. Nocturnal hypoxemia  During this visit, we reviewed the summary of the study including position, event distribution, oximetry and heart rate. Severe obstructive sleep apnea was seen with AHI 36.6 events per hour. Sleep associated hypoxemia was present with 24.5 minutes spent <=88% and artie 81%.    I reviewed the diagnosis of obstructive sleep apnea with patient. We discussed consequences of untreated YUVAL, such as markedly elevated risk for heart attack or stroke, and benefits of treatment. She is interested in starting treatment of YUVAL with PAP therapy.  Order placed for autoCPAP [id number]. Will be enrolled in [facility].   - Comprehensive DME    Reviewed importance of nightly therapy for effective treatment of YUVAL, and she voiced understanding and agreement.  We also reviewed importance of using PAP during the entire sleep duration to achieve maximal benefits, but reviewed that a minimum of 4 hours per night was required.  She is confident that she can achieve these goals and is willing to start therapy as soon as possible.    She will be seen back approximately 2 months after starting PAP therapy to ensure compliance and review treatment outcomes.  However, if she develops any difficulties with treatment she is instructed to contact us or DME company immediately to troubleshoot problems.         Chief Complaint      Chief Complaint   Patient presents with     Video Visit     test results          History of Present Illness:   Isabela Olivo is a 56 year old female who presents to the clinic for results of recent sleep study completed on 2/2/2022. Study was completed to evaluate for obstructive sleep apnea.    Patient reports she slept poorly the night of her study, admits to monitoring discomfort  "and concern about device recording properly and \"test anxiety\".     Reviewed results of sleep study with patient as follows:  HOME SLEEP STUDY INTERPRETATION   Analysis Time:  488 minutes     Respiration:   Sleep Associated Hypoxemia: sustained hypoxemia was present. Baseline oxygen saturation was 93%.  Time with saturation less than or equal to 88% was 24.5 minutes. The lowest oxygen saturation was 81%.   Snoring: Snoring was present.  Respiratory events: The home study revealed a presence of 168 obstructive apneas and 17 mixed and central apneas. There were 102 hypopneas resulting in a combined apnea/hypopnea index [AHI] of 36.6 events per hour.  AHI was 64.5 per hour supine, - per hour prone, 35.9 per hour on left side, and 26.3 per hour on right side.   Pattern: Excluding events noted above, respiratory rate and pattern was Normal.     Position: Percent of time spent: supine - 20.6%, prone - 0%, on left - 21.6%, on right - 54.2%.     Heart Rate: By pulse oximetry normal rate was noted.     Past medical/surgical history, family history, social history, medications and allergies were reviewed.           Physical Examination:   Ht 1.676 m (5' 6\")   Wt 99.8 kg (220 lb)   BMI 35.51 kg/m    General appearance: Awake, alert, cooperative. Well groomed. Sitting comfortably in chair. In no apparent distress.  HEENT: Head: Normocephalic, atraumatic. Eyes:Conjunctiva clear. Sclera normal. Nose: External appearance without deformity.   Pulmonary:  Able to speak easily in full sentences. No cough or wheeze.   Skin:  No rashes or significant lesions on visible skin.   Neurologic: Alert, oriented x3.   Psychiatric: Mood euthymic. Affect congruent with full range and intensity.      CC:  Emily Bustillo PA-C  Feb 15, 2022     [facility]  [address] , [place] [address]     [facility]  [address] [address]    Chart " documentation was completed, in part, with [employer] voice-recognition software. Even though reviewed, some grammatical, spelling, and word errors may remain.    34 minutes spent on day of encounter doing chart review, history and exam, documentation, and further activities as noted above

## 2022-02-17 ENCOUNTER — OFFICE VISIT (OUTPATIENT)
Dept: RHEUMATOLOGY | Facility: CLINIC | Age: 57
End: 2022-02-17
Payer: COMMERCIAL

## 2022-02-17 VITALS
SYSTOLIC BLOOD PRESSURE: 138 MMHG | DIASTOLIC BLOOD PRESSURE: 80 MMHG | BODY MASS INDEX: 36.69 KG/M2 | WEIGHT: 227.3 LBS | HEART RATE: 84 BPM

## 2022-02-17 DIAGNOSIS — M05.79 SEROPOSITIVE RHEUMATOID ARTHRITIS OF MULTIPLE SITES (H): Primary | ICD-10-CM

## 2022-02-17 DIAGNOSIS — M15.0 PRIMARY OSTEOARTHRITIS INVOLVING MULTIPLE JOINTS: ICD-10-CM

## 2022-02-17 DIAGNOSIS — Z79.899 HIGH RISK MEDICATION USE: ICD-10-CM

## 2022-02-17 DIAGNOSIS — N18.31 STAGE 3A CHRONIC KIDNEY DISEASE (H): ICD-10-CM

## 2022-02-17 DIAGNOSIS — R76.8 POSITIVE ANTI-CCP TEST: ICD-10-CM

## 2022-02-17 PROCEDURE — 99214 OFFICE O/P EST MOD 30 MIN: CPT | Performed by: INTERNAL MEDICINE

## 2022-02-17 RX ORDER — HYDROXYCHLOROQUINE SULFATE 200 MG/1
200 TABLET, FILM COATED ORAL 2 TIMES DAILY WITH MEALS
Qty: 180 TABLET | Refills: 0 | Status: SHIPPED | OUTPATIENT
Start: 2022-02-17 | End: 2022-06-23

## 2022-02-17 RX ORDER — SULFASALAZINE 500 MG/1
1000 TABLET ORAL 2 TIMES DAILY
Qty: 360 TABLET | Refills: 0 | Status: SHIPPED | OUTPATIENT
Start: 2022-02-17 | End: 2022-07-25

## 2022-02-17 RX ORDER — FOLIC ACID 1 MG/1
1000 TABLET ORAL DAILY
Qty: 90 TABLET | Refills: 1 | Status: SHIPPED | OUTPATIENT
Start: 2022-02-17 | End: 2022-12-26

## 2022-02-17 NOTE — PROGRESS NOTES
"      Rheumatology follow-up visit note     Isabela is a 56 year old female presents today for follow-up.    Isabela was seen today for recheck.    Diagnoses and all orders for this visit:    Seropositive rheumatoid arthritis of multiple sites (H)  -     hydroxychloroquine (PLAQUENIL) 200 MG tablet; Take 1 tablet (200 mg) by mouth 2 times daily (with meals)  -     folic acid (FOLVITE) 1 MG tablet; Take 1 tablet (1,000 mcg) by mouth daily  -     sulfaSALAzine (AZULFIDINE) 500 MG tablet; Take 2 tablets (1,000 mg) by mouth 2 times daily    High risk medication use  -     folic acid (FOLVITE) 1 MG tablet; Take 1 tablet (1,000 mcg) by mouth daily    Primary osteoarthritis involving multiple joints    Positive anti-CCP test    Stage 3a chronic kidney disease (H)        Well-controlled seropositive rheumatoid arthritis, osteoarthritis management principles reviewed she is to continue current regimen.  She gets her eyes examined every year.  We will meet here in 6 months labs every 3 months.  She is fully vaccinated and boosted.     HPI    Isabela STRATTON Vorajat is a 56 year old female is here for follow-up of  follow-up.  She has rheumatoid arthritis.  She has osteoarthritis.  She is doing great with the current triple regimen of hydroxychloroquine sulfasalazine and methotrexate with folic acid recent labs are normal she has noted \"a bump or lump\" on her index finger more so on the right side.  This is painless.   She is contemplating retiring from her current job as a , [employer]. Her  sustained stroke and is making good recovery from he has a left half weakness.  She has been found to have sleep apnea and looking forward to getting the CPAP started.    DETAILED EXAMINATION  02/17/22  :    Vitals:    02/17/22 0755   BP: 138/80   Pulse: 84   Weight: 103.1 kg (227 lb 4.8 oz)     Alert oriented. Head including the face is examined for malar rash, heliotropes, scarring, lupus pernio. Eyes examined for redness such as in " episcleritis/scleritis, periorbital lesions.   Neck/ Face examined for parotid gland swelling, range of motion of neck.  Left upper and lower and right upper and lower extremities examined for tenderness, swelling, warmth of the appendicular joints, range of motion, edema, rash.  Some of the important findings included: she does not have evidence of synovitis in the palpable joints of the upper extremities.  No significant deformities of the digits.  Small Heberden nodes such as right index.  Range of motion of the shoulders  show full abduction.  No JLT effusion or warmth of the knees.  she does not have dactylitis of the digits.     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 10/20/2016     Priority: Medium     Hypothyroidism      Priority: Medium     Created by Conversion  Replacement Utility updated for latest IMO load         Hypertension      Priority: Medium     Created by Conversion  Replacement Utility updated for latest IMO load         Vitamin D Deficiency      Priority: Medium     Created by Conversion  Replacement Utility updated for latest IMO load         Seropositive rheumatoid arthritis of multiple sites (H) 04/07/2016     Priority: Medium     High risk medication use 04/07/2016     Priority: Medium     Past Surgical History:   Procedure Laterality Date     OH THYROID LOBECTOMY,UNILAT      Description: Thyroid Surgery Thyroid Lobectomy Right Lobe;  Proc Date: 02/05/2007;      No past medical history on file.  Allergies   Allergen Reactions     Dilantin Infatabs [Phenytoin] Unknown     Phenytoin Sodium Extended  [Phenytoin] Hives     Current Outpatient Medications   Medication Sig Dispense Refill     Bioflavonoid Products (VITAMIN C) CHEW        cholecalciferol, vitamin D3, (CHOLECALCIFEROL) 1,000 unit tablet [CHOLECALCIFEROL, VITAMIN D3, (CHOLECALCIFEROL) 1,000 UNIT TABLET] Take 1,000 Units by mouth daily.       doxycycline monohydrate (MONODOX) 100 MG capsule Take 1 capsule (100 mg) by mouth 2 times daily 20 capsule 1     folic acid (FOLVITE) 1 MG tablet TAKE 1 TABLET BY MOUTH  DAILY 90 tablet 1     hydrochlorothiazide (HYDRODIURIL) 25 MG tablet Take 1 tablet (25 mg) by mouth daily 90 tablet 3     hydroxychloroquine (PLAQUENIL) 200 MG tablet Take 1 tablet (200 mg) by mouth 2 times daily (with meals) 180 tablet 0     levothyroxine (SYNTHROID/LEVOTHROID) 125 MCG tablet TAKE 1 TABLET BY MOUTH  DAILY 90 tablet 1     lisinopril (ZESTRIL) 20 MG tablet Take 1 tablet (20 mg) by mouth daily 90 tablet 3     methotrexate 2.5 MG tablet Take 8 tablets (20 mg) by mouth every 7 days 64 tablet 0     sulfaSALAzine (AZULFIDINE) 500 MG tablet Take 2 tablets (1,000 mg) by mouth 2 times daily 360 tablet 0     family history includes Breast Cancer (age of onset: 62.00) in her mother; Diabetes in her father; Heart Disease in her maternal grandfather; Hyperlipidemia in her father and mother; Hypertension in her father and mother; Sleep Apnea in her father.  Social Connections: Not on file          WBC Count   Date Value Ref Range Status   02/15/2022 5.7 4.0 - 11.0 10e3/uL Final     RBC Count   Date Value Ref Range Status   02/15/2022 4.27 3.80 - 5.20 10e6/uL Final     Hemoglobin   Date Value Ref Range Status   02/15/2022 13.1 11.7 - 15.7 g/dL Final     Hematocrit   Date Value Ref Range Status   02/15/2022 40.9 35.0 - 47.0 % Final     MCV   Date Value Ref Range Status   02/15/2022 96 78 - 100 fL Final     MCH   Date Value Ref Range Status   02/15/2022 30.7 26.5 - 33.0 pg Final     Platelet Count   Date Value Ref Range Status   02/15/2022 272 150  - 450 10e3/uL Final     AST   Date Value Ref Range Status   10/08/2021 28 0 - 40 U/L Final     ALT   Date Value Ref Range Status   02/15/2022 17 0 - 45 U/L Final     Albumin   Date Value Ref Range Status   02/15/2022 4.0 3.5 - 5.0 g/dL Final     Alkaline Phosphatase   Date Value Ref Range Status   10/08/2021 59 45 - 120 U/L Final     Creatinine   Date Value Ref Range Status   02/15/2022 0.89 0.60 - 1.10 mg/dL Final     GFR Estimate   Date Value Ref Range Status   02/15/2022 75 >60 mL/min/1.73m2 Final     Comment:     Effective December 21, 2021 eGFRcr in adults is calculated using the 2021 CKD-EPI creatinine equation which includes age and gender (Branden et al., NEJM, DOI: 10.1056/VNFBcu3872534)   05/04/2021 58 (L) >60 mL/min/1.73m2 Final     GFR Estimate If Black   Date Value Ref Range Status   05/04/2021 >60 >60 mL/min/1.73m2 Final

## 2022-02-25 NOTE — NURSING NOTE
Prescribed CPAP today, follow-up 2 months after starting for compliance visit. Sent [facility] message.      Boone Moon, Visit facilitator

## 2022-03-07 ENCOUNTER — DOCUMENTATION ONLY (OUTPATIENT)
Dept: SLEEP MEDICINE | Facility: CLINIC | Age: 57
End: 2022-03-07
Payer: COMMERCIAL

## 2022-03-07 DIAGNOSIS — G47.33 OSA (OBSTRUCTIVE SLEEP APNEA): ICD-10-CM

## 2022-03-07 NOTE — PROGRESS NOTES
Patient was offered choice of vendor and chose [facility].  Patient Isabela STRATTON Voit was set up at [facility]  on March 7, 2022. Patient received a Resmed Airsense 11 Pressures were set at 5-15 cm H2O.   Patient s ramp is 4 cm H2O for Auto and FLEX/EPR is EPR, 2.  Patient received a Rebeca Respironics Mask name: Dreamwear Nasal mask size Standard, fitpack, heated tubing and heated humidifier.  Patient does not need to meet compliance. Patient has a follow up recommended but not required with Lisa Umanzor PA-C .    Tracy L Fahrenkamp

## 2022-03-10 ENCOUNTER — DOCUMENTATION ONLY (OUTPATIENT)
Dept: SLEEP MEDICINE | Facility: CLINIC | Age: 57
End: 2022-03-10
Payer: COMMERCIAL

## 2022-03-10 DIAGNOSIS — G47.33 OSA (OBSTRUCTIVE SLEEP APNEA): ICD-10-CM

## 2022-03-10 NOTE — PROGRESS NOTES
3 day Sleep therapy management telephone visit    Diagnostic AHI:  36.6 HST    Confirmed with patient at time of call- N/A Patient is still interested in STM service       Message left for patient to return call.        Objective data     Order Settings for PAP  CPAP min 5    CPAP max 15             Device settings from machine                           Assessment: Nightly usage over four hours      Action plan: Patient to have 14 day STM visit. Patient has a follow up visit scheduled:   no    Replacement device: No  STM ordered by provider: Yes     Total time spent on accessing and  interpreting remote patient PAP therapy data  10 minutes    Total time spent counseling, coaching  and reviewing PAP therapy data with patient  1 minutes    [id number] no

## 2022-03-16 ENCOUNTER — DOCUMENTATION ONLY (OUTPATIENT)
Dept: SLEEP MEDICINE | Facility: CLINIC | Age: 57
End: 2022-03-16
Payer: COMMERCIAL

## 2022-03-16 DIAGNOSIS — G47.33 OSA (OBSTRUCTIVE SLEEP APNEA): ICD-10-CM

## 2022-03-16 NOTE — PROGRESS NOTES
STM Recheck:  Patient called and stated she sometimes doesn't like when the CPAP leaks because she is side sleeper. We talk about a CPAP pillow. I changed patient response from standard to soft. Will check in with patient in 10 days.

## 2022-03-18 ENCOUNTER — MYC REFILL (OUTPATIENT)
Dept: FAMILY MEDICINE | Facility: CLINIC | Age: 57
End: 2022-03-18
Payer: COMMERCIAL

## 2022-03-18 DIAGNOSIS — M05.79 SEROPOSITIVE RHEUMATOID ARTHRITIS OF MULTIPLE SITES (H): ICD-10-CM

## 2022-03-22 ENCOUNTER — DOCUMENTATION ONLY (OUTPATIENT)
Dept: SLEEP MEDICINE | Facility: CLINIC | Age: 57
End: 2022-03-22
Payer: COMMERCIAL

## 2022-03-22 DIAGNOSIS — G47.33 OSA (OBSTRUCTIVE SLEEP APNEA): ICD-10-CM

## 2022-03-22 NOTE — PROGRESS NOTES
14  DAY STM VISIT    Diagnostic AHI:  36.6 HST    Message left for patient to return call     Assessment: Pt meeting objective benchmarks.       Action plan: waiting for patient to return call.       Device type: Auto-CPAP    PAP settings: CPAP min 5.0 cm  H20       CPAP max 15.0 cm  H20      95th% pressure 9.2 cm  H20        RESMED EPR level Setting: TWO    RESMED Soft response setting:  ON    Mask type:  Nasal Mask    Objective measures: 14 day rolling measures      Compliance  85 %      Leak  9.28  lpm  last  upload      AHI 3.58   last  upload      Average number of minutes 417      Objective measure goal  Compliance   Goal >70%  Leak   Goal < 24 lpm  AHI  Goal < 5  Usage  Goal >240        Total time spent on accessing and interpreting remote patient PAP therapy data  1 minutes    Total time spent counseling, coaching  and reviewing PAP therapy data with patient  1 minutes    [id number]  [id number]  no (3 day STM)

## 2022-03-22 NOTE — Clinical Note
"Hello,  Not sure if I need an \"official\" order to change pressure so I'm covering my bases. Can you please sign pended order?  Resmed 5-10 cm h2o"

## 2022-03-28 NOTE — PROGRESS NOTES
Patient returned call. She said she's been waking up really dizzy and with a headache.   She stopped using her CPAP for two nights and noticed that she wasn't dizzy.

## 2022-04-04 ENCOUNTER — TRANSFERRED RECORDS (OUTPATIENT)
Dept: HEALTH INFORMATION MANAGEMENT | Facility: CLINIC | Age: 57
End: 2022-04-04
Payer: COMMERCIAL

## 2022-04-05 ENCOUNTER — ANCILLARY PROCEDURE (OUTPATIENT)
Dept: MAMMOGRAPHY | Facility: CLINIC | Age: 57
End: 2022-04-05
Attending: INTERNAL MEDICINE
Payer: COMMERCIAL

## 2022-04-05 DIAGNOSIS — R92.30 DENSE BREAST TISSUE ON MAMMOGRAM: ICD-10-CM

## 2022-04-05 DIAGNOSIS — Z12.31 ENCOUNTER FOR SCREENING MAMMOGRAM FOR BREAST CANCER: ICD-10-CM

## 2022-04-05 PROCEDURE — 77067 SCR MAMMO BI INCL CAD: CPT

## 2022-04-06 ENCOUNTER — TRANSFERRED RECORDS (OUTPATIENT)
Dept: HEALTH INFORMATION MANAGEMENT | Facility: CLINIC | Age: 57
End: 2022-04-06
Payer: COMMERCIAL

## 2022-04-07 ENCOUNTER — DOCUMENTATION ONLY (OUTPATIENT)
Dept: SLEEP MEDICINE | Facility: CLINIC | Age: 57
End: 2022-04-07
Payer: COMMERCIAL

## 2022-04-07 DIAGNOSIS — G47.33 OSA (OBSTRUCTIVE SLEEP APNEA): ICD-10-CM

## 2022-04-07 NOTE — PROGRESS NOTES
30 DAY [facility] VISIT    Diagnostic AHI:  36.6  HST    Message left for patient to return call.     Assessment: Pt meeting objective benchmarks.     Action plan: waiting for patient to return call.   Patient has not scheduled a follow up visit.   Device type: Auto-CPAP  PAP settings: CPAP min 5.0 cm  H20     CPAP max 10.0 cm  H20    95th% pressure 9.1 cm  H20      RESMED EPR level Setting: TWO    RESMED Soft response setting:  ON  Mask type:  Nasal Mask  Objective measures: 14 day rolling measures      Compliance  78 %      Leak  13.94 lpm  last  upload      AHI 4.18   last  upload      Average number of minutes 341      Objective measure goal  Compliance   Goal >70%  Leak   Goal < 24 lpm  AHI  Goal < 5  Usage  Goal >240        Total time spent on accessing and interpreting remote patient PAP therapy data  10 minutes    Total time spent counseling, coaching  and reviewing PAP therapy data with patient  1 minutes     [id number] this call  [id number] no  at 3 or 14 day [facility]

## 2022-04-19 ENCOUNTER — LAB (OUTPATIENT)
Dept: FAMILY MEDICINE | Facility: CLINIC | Age: 57
End: 2022-04-19
Attending: FAMILY MEDICINE
Payer: COMMERCIAL

## 2022-04-19 DIAGNOSIS — Z20.822 SUSPECTED 2019 NOVEL CORONAVIRUS INFECTION: ICD-10-CM

## 2022-04-19 PROCEDURE — U0005 INFEC AGEN DETEC AMPLI PROBE: HCPCS

## 2022-04-19 PROCEDURE — U0003 INFECTIOUS AGENT DETECTION BY NUCLEIC ACID (DNA OR RNA); SEVERE ACUTE RESPIRATORY SYNDROME CORONAVIRUS 2 (SARS-COV-2) (CORONAVIRUS DISEASE [COVID-19]), AMPLIFIED PROBE TECHNIQUE, MAKING USE OF HIGH THROUGHPUT TECHNOLOGIES AS DESCRIBED BY CMS-2020-01-R: HCPCS

## 2022-04-20 ENCOUNTER — TELEPHONE (OUTPATIENT)
Dept: NURSING | Facility: CLINIC | Age: 57
End: 2022-04-20
Payer: COMMERCIAL

## 2022-04-20 LAB — SARS-COV-2 RNA RESP QL NAA+PROBE: POSITIVE

## 2022-04-20 NOTE — PROGRESS NOTES
"Isabela is a 56 year old who is being evaluated via a billable video visit.      How would you like to obtain your AVS? MyChart  If the video visit is dropped, the invitation should be resent by: Text to cell phone: [phone]   Will anyone else be joining your video visit? No    Video Start Time: 9:40AM    Assessment & Plan     Clinical diagnosis of COVID-19  - nirmatrelvir and ritonavir (PAXLOVID) therapy pack; 300 mg Nirmatrelvir (2X150 mg) and 100 mg ritonavir twice daily for 5 days    Fever, unspecified fever cause  - nirmatrelvir and ritonavir (PAXLOVID) therapy pack; 300 mg Nirmatrelvir (2X150 mg) and 100 mg ritonavir twice daily for 5 days    Morbid obesity (H)  Weight management plan: Discussed healthy diet and exercise guidelines       MEDICATIONS:  Continue current medications without change  SELF MONITORING:  Work on weight loss  Regular exercise  See Patient Instructions  COVID-19 positive patient.  Encounter for consideration of medication intervention. Patient does qualify for a prescription. Full discussion with patient including medication options, risks and benefits. Potential drug interactions reviewed with patient. Treatment Planned Paxlovid, Rx sent to [place] pharmacy  [facility]   [phone]    [address] [place] [address]    Hours:  Mon-Fri: 8:30a - 5:00p  Sat-Sun: 9:00a - 1:00p    Drive-thru available   Temporary change to home medications: None   None     Estimated body mass index is 36.69 kg/m  as calculated from the following:    Height as of 2/15/22: 1.676 m (5' 6\").    Weight as of 2/17/22: 103.1 kg (227 lb 4.8 oz).  GFR Estimate   Date Value Ref Range Status   02/15/2022 75 >60 mL/min/1.73m2 Final     Comment:     Effective December 21, 2021 eGFRcr in adults is calculated using the 2021 CKD-EPI creatinine equation which includes age and gender (Branden kramer al., NEJM, DOI: 10.1056/EBXPjz8876830)   05/04/2021 58 (L) >60 mL/min/1.73m2 Final     Lab Results "   Component Value Date    [id number] Positive (A) 04/19/2022       No follow-ups on file.    Divine Virgen MD  [facility] DEBBIE Mar is a 56 year old who presents for the following health issues     HPI     Positive for covid 19 on 4/19/2022. Patient states is at high risk RA and would like to discuss treatment options.     OVID-19 Symptom Review  How many days ago did these symptoms start? 04/19/2022    Are any of the following symptoms significant for you?  New or worsening difficulty breathing? Yes    Please describe what kind of difficulty you are having breathing:No dyspnea, or dyspnea at patients normal baseline    Worsening cough? Yes, I am coughing up mucus.    Fever or chills? Yes, the highest temperature was 100.    Headache: no    Sore throat: no    Chest pain: no    Diarrhea: no    Body aches? YES    What treatments has patient tried? Guaifenesin (mucinex) and Acetaminophen   Does patient live in a nursing home, group home, or shelter? no  Does patient have a way to get food/medications during quarantined? Yes, I have a friend or family member who can help me.  She states she is improving      Review of Systems   Constitutional, HEENT, cardiovascular, pulmonary, GI, , musculoskeletal, neuro, skin, endocrine and psych systems are negative, except as otherwise noted.      Objective    Vitals - Patient Reported  Pain Score: No Pain (0)      Vitals:  No vitals were obtained today due to virtual visit.    Physical Exam   GENERAL: Healthy, alert and no distress  EYES: Eyes grossly normal to inspection.  No discharge or erythema, or obvious scleral/conjunctival abnormalities.  RESP: No audible wheeze, cough, or visible cyanosis.  No visible retractions or increased work of breathing.    SKIN: Visible skin clear. No significant rash, abnormal pigmentation or lesions.  NEURO: Cranial nerves grossly intact.  Mentation and speech appropriate for age.  PSYCH: Mentation appears  normal, affect normal/bright, judgement and insight intact, normal speech and appearance well-groomed.            Video-Visit Details    Type of service:  Video Visit    Video End Time:10:05AM    Originating Location (pt. Location): Home    Distant Location (provider location):  [facility]     Platform used for Video Visit: Teamisto

## 2022-04-20 NOTE — TELEPHONE ENCOUNTER
Coronavirus (COVID-19) Notification    Caller Name (Patient, parent, daughter/son, grandparent, etc)  patient    Reason for call  Notify of Positive Coronavirus (COVID-19) lab results, assess symptoms,  review [facility] recommendations    Lab Result    Lab test:  2019-nCoV rRt-PCR or SARS-CoV-2 PCR    Oropharyngeal AND/OR nasopharyngeal swabs is POSITIVE for 2019-nCoV RNA/SARS-COV-2 PCR (COVID-19 virus)      Gather patient reported symptoms   Assessment   Current Symptoms at time of phone call, reported by patient: (if no symptoms, document: No symptoms] Cough, nasal congestion    Date of symptom(s) onset (if applicable) 4/18/22     If at time of call, Patients symptoms have worsened, the Patient should contact 911 or have someone drive them to Emergency Dept promptly:      If Patient calling 911, inform 911 personal that you have tested positive for the Coronavirus (COVID-19).  Place mask on and await 911 to arrive.    If Emergency Dept, If possible, please have another adult drive you to the Emergency Dept but you need to wear mask when in contact with other people.      Treatment Options:   Patient classified as COVID treatment eligible by Epic high risk algorithm: Yes  Is the patient symptomatic at the time of result notification? Yes. Was the onset of symptoms within the last 5 days? Yes.   There are now oral medications available for the treatment of COVID-19.  Taking one of these medications within the first five days of symptoms (when people may not yet feel severely ill) has been shown to make people feel better, prevent them from getting sicker, and preventing hospitalization and death.   Does the patient agree to have a visit with a provider to discuss treatment options? Yes. Is the patient seen at [facility]?  No: Warm transfer caller to [phone] to be scheduled with a virtual urgent provider.  During transfer, instruct  on appropriate time frame for visit     Review  information with Patient    Your result was positive. This means you have COVID-19 (coronavirus).    How can I protect others?    These guidelines are for isolating before returning to work, school or .    If you DO have symptoms    Stay home and away from others     For at least 5 days after your symptoms started, AND    You are fever free for 24 hours (with no medicine that reduces fever), AND    Your other symptoms are better    Wear a mask for 10 full days anytime you are around others    If you DON'T have symptoms    Stay home and away from others for at least 5 days after your positive test    Wear a mask for 10 full days anytime you are around others    There may be different guidelines for healthcare facilities.  Please check with the specific sites before arriving.    If you have been told by a doctor that you were severely ill with COVID-19 or are immunocompromised, you should isolate for at least 10 days.    You should not go back to work until you meet the guidelines above for ending your home isolation. You don't need to be retested for COVID-19 before going back to work--studies show that you won't spread the virus if it's been at least 10 days since your symptoms started (or 20 days, if you have a weak immune system).    Employers, schools, and daycares: This is an official notice for this person's medical guidelines for returning in-person.  They must meet the above guidelines before going back to work, school or  in person.    You will receive a positive COVID-19 letter via [employer] or the mail soon with additional self-care information (exception, no letters will be sent to presurgical/preprocedure patients).    Would you like me to review some of that information with you now?  No    If you were tested for an upcoming procedure, please contact your provider for next steps.    Bianca Trinidad LPN

## 2022-04-20 NOTE — TELEPHONE ENCOUNTER
Patient classified as COVID treatment eligible by Epic high risk algorithm:  Yes    Coronavirus (COVID-19) Notification    Reason for call  Notify of POSITIVE COVID-19 lab result, assess symptoms,  review [facility] recommendations    Lab Result   Lab test for 2019-nCoV rRt-PCR or SARS-COV-2 PCR  Oropharyngeal AND/OR nasopharyngeal swabs were POSITIVE for 2019-nCoV RNA [OR] SARS-COV-2 RNA (COVID-19) RNA     We have been unable to reach patient by phone at this time to notify of their Positive COVID-19 result.    Left voicemail message requesting a call back to [phone] [facility] for results.        A Positive COVID-19 letter will be sent via [employer] or the mail. (Exception, no letters sent to Presurgerical/Preprocedure Patients)    Keara Maria

## 2022-04-21 ENCOUNTER — VIRTUAL VISIT (OUTPATIENT)
Dept: FAMILY MEDICINE | Facility: CLINIC | Age: 57
End: 2022-04-21
Payer: COMMERCIAL

## 2022-04-21 ENCOUNTER — MYC MEDICAL ADVICE (OUTPATIENT)
Dept: FAMILY MEDICINE | Facility: CLINIC | Age: 57
End: 2022-04-21
Payer: COMMERCIAL

## 2022-04-21 DIAGNOSIS — U07.1 CLINICAL DIAGNOSIS OF COVID-19: Primary | ICD-10-CM

## 2022-04-21 DIAGNOSIS — E66.01 MORBID OBESITY (H): ICD-10-CM

## 2022-04-21 DIAGNOSIS — R50.9 FEVER, UNSPECIFIED FEVER CAUSE: ICD-10-CM

## 2022-04-21 PROCEDURE — 99214 OFFICE O/P EST MOD 30 MIN: CPT | Mod: GT | Performed by: FAMILY MEDICINE

## 2022-04-21 ASSESSMENT — PAIN SCALES - GENERAL: PAINLEVEL: NO PAIN (0)

## 2022-04-21 ASSESSMENT — ASTHMA QUESTIONNAIRES: ACT_TOTALSCORE: 25

## 2022-04-21 NOTE — PROGRESS NOTES
04/21/22 0900   MASSBP Score   Age Greater than or equal to 65 years 0   BMI greater than or equal to 35 kg/m2 2   Has Diabetes Mellitus 0   Has Chronic Kidney Disease 0   Has Cardiovascular Disease and 55 years or older 0   Has Chronic Respiratory Disease and 55 years or older 3   Has Hypertension and 55 years or older 1   Is Immunocompromised 4   Is Pregnant 0   Member of [facility] community (Black/, /, ,  or , or  or [place] Native)  0   MASSBP Score 10   Has the patient had a positive COVID test outside our system?  Yes   What day did symptoms start?  04/19/22

## 2022-05-04 ENCOUNTER — LAB (OUTPATIENT)
Dept: LAB | Facility: CLINIC | Age: 57
End: 2022-05-04
Payer: COMMERCIAL

## 2022-05-04 DIAGNOSIS — E03.9 ACQUIRED HYPOTHYROIDISM: ICD-10-CM

## 2022-05-04 LAB — TSH SERPL DL<=0.005 MIU/L-ACNC: 0.31 UIU/ML (ref 0.3–5)

## 2022-05-04 PROCEDURE — 84443 ASSAY THYROID STIM HORMONE: CPT

## 2022-05-04 PROCEDURE — 36415 COLL VENOUS BLD VENIPUNCTURE: CPT

## 2022-05-10 DIAGNOSIS — E03.9 HYPOTHYROIDISM: ICD-10-CM

## 2022-05-10 RX ORDER — LEVOTHYROXINE SODIUM 125 UG/1
125 TABLET ORAL DAILY
Qty: 90 TABLET | Refills: 1 | Status: SHIPPED | OUTPATIENT
Start: 2022-05-10 | End: 2023-01-03

## 2022-05-17 ENCOUNTER — TRANSFERRED RECORDS (OUTPATIENT)
Dept: HEALTH INFORMATION MANAGEMENT | Facility: CLINIC | Age: 57
End: 2022-05-17

## 2022-05-20 ENCOUNTER — LAB (OUTPATIENT)
Dept: LAB | Facility: CLINIC | Age: 57
End: 2022-05-20
Payer: COMMERCIAL

## 2022-05-20 DIAGNOSIS — M05.79 SEROPOSITIVE RHEUMATOID ARTHRITIS OF MULTIPLE SITES (H): ICD-10-CM

## 2022-05-20 LAB
ALBUMIN SERPL-MCNC: 4 G/DL (ref 3.5–5)
ALT SERPL W P-5'-P-CCNC: 17 U/L (ref 0–45)
CREAT SERPL-MCNC: 0.86 MG/DL (ref 0.6–1.1)
ERYTHROCYTE [DISTWIDTH] IN BLOOD BY AUTOMATED COUNT: 13.4 % (ref 10–15)
GFR SERPL CREATININE-BSD FRML MDRD: 78 ML/MIN/1.73M2
HCT VFR BLD AUTO: 40.1 % (ref 35–47)
HGB BLD-MCNC: 12.9 G/DL (ref 11.7–15.7)
MCH RBC QN AUTO: 31.1 PG (ref 26.5–33)
MCHC RBC AUTO-ENTMCNC: 32.2 G/DL (ref 31.5–36.5)
MCV RBC AUTO: 97 FL (ref 78–100)
PLATELET # BLD AUTO: 263 10E3/UL (ref 150–450)
RBC # BLD AUTO: 4.15 10E6/UL (ref 3.8–5.2)
WBC # BLD AUTO: 4.7 10E3/UL (ref 4–11)

## 2022-05-20 PROCEDURE — 82040 ASSAY OF SERUM ALBUMIN: CPT

## 2022-05-20 PROCEDURE — 84460 ALANINE AMINO (ALT) (SGPT): CPT

## 2022-05-20 PROCEDURE — 85027 COMPLETE CBC AUTOMATED: CPT

## 2022-05-20 PROCEDURE — 36415 COLL VENOUS BLD VENIPUNCTURE: CPT

## 2022-05-20 PROCEDURE — 82565 ASSAY OF CREATININE: CPT

## 2022-06-22 DIAGNOSIS — M05.79 SEROPOSITIVE RHEUMATOID ARTHRITIS OF MULTIPLE SITES (H): ICD-10-CM

## 2022-06-23 RX ORDER — HYDROXYCHLOROQUINE SULFATE 200 MG/1
TABLET, FILM COATED ORAL
Qty: 180 TABLET | Refills: 0 | Status: SHIPPED | OUTPATIENT
Start: 2022-06-23 | End: 2022-06-28

## 2022-06-28 ENCOUNTER — MYC REFILL (OUTPATIENT)
Dept: RHEUMATOLOGY | Facility: CLINIC | Age: 57
End: 2022-06-28

## 2022-06-28 DIAGNOSIS — M05.79 SEROPOSITIVE RHEUMATOID ARTHRITIS OF MULTIPLE SITES (H): ICD-10-CM

## 2022-06-28 RX ORDER — HYDROXYCHLOROQUINE SULFATE 200 MG/1
200 TABLET, FILM COATED ORAL 2 TIMES DAILY WITH MEALS
Qty: 180 TABLET | Refills: 1 | Status: SHIPPED | OUTPATIENT
Start: 2022-06-28 | End: 2022-08-17

## 2022-07-18 ENCOUNTER — LAB (OUTPATIENT)
Dept: FAMILY MEDICINE | Facility: CLINIC | Age: 57
End: 2022-07-18
Attending: FAMILY MEDICINE
Payer: COMMERCIAL

## 2022-07-18 DIAGNOSIS — Z20.822 SUSPECTED 2019 NOVEL CORONAVIRUS INFECTION: ICD-10-CM

## 2022-07-18 PROCEDURE — U0003 INFECTIOUS AGENT DETECTION BY NUCLEIC ACID (DNA OR RNA); SEVERE ACUTE RESPIRATORY SYNDROME CORONAVIRUS 2 (SARS-COV-2) (CORONAVIRUS DISEASE [COVID-19]), AMPLIFIED PROBE TECHNIQUE, MAKING USE OF HIGH THROUGHPUT TECHNOLOGIES AS DESCRIBED BY CMS-2020-01-R: HCPCS

## 2022-07-18 PROCEDURE — 99207 PR NO CHARGE LOS: CPT

## 2022-07-18 PROCEDURE — U0005 INFEC AGEN DETEC AMPLI PROBE: HCPCS

## 2022-07-19 LAB — SARS-COV-2 RNA RESP QL NAA+PROBE: NEGATIVE

## 2022-08-07 ENCOUNTER — MYC REFILL (OUTPATIENT)
Dept: FAMILY MEDICINE | Facility: CLINIC | Age: 57
End: 2022-08-07

## 2022-08-07 DIAGNOSIS — M05.79 SEROPOSITIVE RHEUMATOID ARTHRITIS OF MULTIPLE SITES (H): ICD-10-CM

## 2022-08-16 ENCOUNTER — LAB (OUTPATIENT)
Dept: LAB | Facility: CLINIC | Age: 57
End: 2022-08-16
Payer: COMMERCIAL

## 2022-08-16 DIAGNOSIS — M05.79 SEROPOSITIVE RHEUMATOID ARTHRITIS OF MULTIPLE SITES (H): ICD-10-CM

## 2022-08-16 LAB
ALBUMIN SERPL BCG-MCNC: 4.4 G/DL (ref 3.5–5.2)
ALT SERPL W P-5'-P-CCNC: 21 U/L (ref 10–35)
CREAT SERPL-MCNC: 1 MG/DL (ref 0.51–0.95)
ERYTHROCYTE [DISTWIDTH] IN BLOOD BY AUTOMATED COUNT: 13.5 % (ref 10–15)
GFR SERPL CREATININE-BSD FRML MDRD: 65 ML/MIN/1.73M2
HCT VFR BLD AUTO: 39.1 % (ref 35–47)
HGB BLD-MCNC: 12.8 G/DL (ref 11.7–15.7)
MCH RBC QN AUTO: 30.7 PG (ref 26.5–33)
MCHC RBC AUTO-ENTMCNC: 32.7 G/DL (ref 31.5–36.5)
MCV RBC AUTO: 94 FL (ref 78–100)
PLATELET # BLD AUTO: 269 10E3/UL (ref 150–450)
RBC # BLD AUTO: 4.17 10E6/UL (ref 3.8–5.2)
WBC # BLD AUTO: 4.7 10E3/UL (ref 4–11)

## 2022-08-16 PROCEDURE — 85027 COMPLETE CBC AUTOMATED: CPT

## 2022-08-16 PROCEDURE — 84460 ALANINE AMINO (ALT) (SGPT): CPT

## 2022-08-16 PROCEDURE — 82040 ASSAY OF SERUM ALBUMIN: CPT

## 2022-08-16 PROCEDURE — 82565 ASSAY OF CREATININE: CPT

## 2022-08-16 PROCEDURE — 36415 COLL VENOUS BLD VENIPUNCTURE: CPT

## 2022-08-17 ENCOUNTER — OFFICE VISIT (OUTPATIENT)
Dept: RHEUMATOLOGY | Facility: CLINIC | Age: 57
End: 2022-08-17
Payer: COMMERCIAL

## 2022-08-17 VITALS
BODY MASS INDEX: 36.7 KG/M2 | SYSTOLIC BLOOD PRESSURE: 130 MMHG | DIASTOLIC BLOOD PRESSURE: 70 MMHG | WEIGHT: 227.4 LBS | HEART RATE: 80 BPM

## 2022-08-17 DIAGNOSIS — M15.0 PRIMARY OSTEOARTHRITIS INVOLVING MULTIPLE JOINTS: ICD-10-CM

## 2022-08-17 DIAGNOSIS — R76.8 POSITIVE ANTI-CCP TEST: ICD-10-CM

## 2022-08-17 DIAGNOSIS — Z79.899 HIGH RISK MEDICATION USE: ICD-10-CM

## 2022-08-17 DIAGNOSIS — M05.79 SEROPOSITIVE RHEUMATOID ARTHRITIS OF MULTIPLE SITES (H): Primary | ICD-10-CM

## 2022-08-17 PROCEDURE — 99214 OFFICE O/P EST MOD 30 MIN: CPT | Performed by: INTERNAL MEDICINE

## 2022-08-17 RX ORDER — SULFASALAZINE 500 MG/1
TABLET ORAL
Qty: 360 TABLET | Refills: 0 | Status: SHIPPED | OUTPATIENT
Start: 2022-08-17 | End: 2023-02-16

## 2022-08-17 RX ORDER — HYDROXYCHLOROQUINE SULFATE 200 MG/1
200 TABLET, FILM COATED ORAL 2 TIMES DAILY WITH MEALS
Qty: 180 TABLET | Refills: 1 | Status: SHIPPED | OUTPATIENT
Start: 2022-08-17 | End: 2023-02-16

## 2022-08-17 NOTE — PROGRESS NOTES
Rheumatology follow-up visit note     Isabela is a 57 year old female presents today for follow-up.    Isabela was seen today for recheck.    Diagnoses and all orders for this visit:    Seropositive rheumatoid arthritis of multiple sites (H)  -     hydroxychloroquine (PLAQUENIL) 200 MG tablet; Take 1 tablet (200 mg) by mouth 2 times daily (with meals)  -     sulfaSALAzine (AZULFIDINE) 500 MG tablet; TAKE TWO TABLETS BY MOUTH TWICE DAILY    High risk medication use    Primary osteoarthritis involving multiple joints    Positive anti-CCP test        Well-controlled seropositive rheumatoid arthritis on triple regimen recent labs within acceptable range continue as now.  Dupuytren's contracture do not require any intervention in her case.  She has medial epicondylitis currently continue observations.  Reminded her eye examination for hydroxychloroquine toxicity monitoring.  Follow-up here in 6 months labs every 3.    Follow up in 6 months.    HPI    Isabela Olivo is a 57 year old female is here for follow-up of  rheumatoid arthritis.  This is polyarticular.  Positive anti-CCP antibody.  She has osteoarthritis.  She is doing great with the current triple regimen of hydroxychloroquine sulfasalazine and methotrexate with folic acid recent labs are normal.  Having worked [employer] she has taken the flange in the took longterm and is very happy with that.  She did more gardening this year she thinks and she is done for a long time.  She noted discomfort around the medial epicondyles on the right elbow.  Her  sustained stroke and is making good recovery from he has a left half weakness.  She has been found to have sleep apnea and looking forward to getting the CPAP started.         DETAILED EXAMINATION  08/17/22  :    Vitals:    08/17/22 0948   BP: 130/70   Pulse: 80   Weight: 103.1 kg (227 lb 6.4 oz)     Alert oriented. Head including the face is examined for malar rash, heliotropes, scarring, lupus  pernio. Eyes examined for redness such as in episcleritis/scleritis, periorbital lesions.   Neck/ Face examined for parotid gland swelling, range of motion of neck.  Left upper and lower and right upper and lower extremities examined for tenderness, swelling, warmth of the appendicular joints, range of motion, edema, rash.  Some of the important findings included: she does not have evidence of synovitis in the palpable joints of the upper extremities.  No significant deformities of the digits.  No Heberden nodes.  Range of motion of the shoulders  show full abduction.  No JLT effusion or warmth of the knees.  she does not have dactylitis of the digits.  She has very early Dupuytren's bilaterally.     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 10/20/2016     Priority: Medium     Hypothyroidism      Priority: Medium     Created by Conversion  Replacement Utility updated for latest IMO load         Hypertension      Priority: Medium     Created by Conversion  Replacement Utility updated for latest IMO load         Vitamin D Deficiency      Priority: Medium     Created by Conversion  Replacement Utility updated for latest IMO load         Seropositive rheumatoid arthritis of multiple sites (H) 04/07/2016     Priority: Medium     High risk medication use 04/07/2016     Priority: Medium     Past Surgical History:   Procedure Laterality Date     BIOPSY  2007    Thyroidectomy     COLONOSCOPY  2014     EYE SURGERY  2020    Eyelids     GYN SURGERY  2005    Fibroids     [facility] THYROID LOBECTOMY,UNILAT      Description: Thyroid Surgery Thyroid  Lobectomy Right Lobe;  Proc Date: 02/05/2007;      Past Medical History:   Diagnosis Date     Arthritis      Hypertension      Allergies   Allergen Reactions     Dilantin Infatabs [Phenytoin] Unknown     Phenytoin Sodium Extended [Phenytoin] Hives     Current Outpatient Medications   Medication Sig Dispense Refill     Bioflavonoid Products (VITAMIN C) CHEW        cholecalciferol, vitamin D3, (CHOLECALCIFEROL) 1,000 unit tablet [CHOLECALCIFEROL, VITAMIN D3, (CHOLECALCIFEROL) 1,000 UNIT TABLET] Take 1,000 Units by mouth daily.       folic acid (FOLVITE) 1 MG tablet Take 1 tablet (1,000 mcg) by mouth daily 90 tablet 1     hydrochlorothiazide (HYDRODIURIL) 25 MG tablet Take 1 tablet (25 mg) by mouth daily 90 tablet 0     hydroxychloroquine (PLAQUENIL) 200 MG tablet Take 1 tablet (200 mg) by mouth 2 times daily (with meals) 180 tablet 1     levothyroxine (SYNTHROID/LEVOTHROID) 125 MCG tablet Take 1 tablet (125 mcg) by mouth daily 90 tablet 1     lisinopril (ZESTRIL) 20 MG tablet Take 1 tablet (20 mg) by mouth daily 90 tablet 0     methotrexate 2.5 MG tablet Take 8 tablets (20 mg) by mouth every 7 days 32 tablet 0     sulfaSALAzine (AZULFIDINE) 500 MG tablet TAKE TWO TABLETS BY MOUTH TWICE DAILY 360 tablet 0     nirmatrelvir and ritonavir (PAXLOVID) therapy pack 300 mg Nirmatrelvir (2X150 mg) and 100 mg ritonavir twice daily for 5 days 30 each 0     family history includes Asthma in her mother; Breast Cancer (age of onset: 62) in her mother; Diabetes in her father; Heart Disease in her maternal grandfather; Hyperlipidemia in her father and mother; Hypertension in her father and mother; Osteoporosis in her mother; Sleep Apnea in her father.  Social Connections: Not on file          WBC Count   Date Value Ref Range Status   08/16/2022 4.7 4.0 - 11.0 10e3/uL Final     RBC Count   Date Value Ref Range Status   08/16/2022 4.17 3.80 - 5.20 10e6/uL Final     Hemoglobin   Date Value Ref Range Status   08/16/2022 12.8 11.7 - 15.7  g/dL Final     Hematocrit   Date Value Ref Range Status   08/16/2022 39.1 35.0 - 47.0 % Final     MCV   Date Value Ref Range Status   08/16/2022 94 78 - 100 fL Final     MCH   Date Value Ref Range Status   08/16/2022 30.7 26.5 - 33.0 pg Final     Platelet Count   Date Value Ref Range Status   08/16/2022 269 150 - 450 10e3/uL Final     AST   Date Value Ref Range Status   10/08/2021 28 0 - 40 U/L Final     ALT   Date Value Ref Range Status   08/16/2022 21 10 - 35 U/L Final     Albumin   Date Value Ref Range Status   08/16/2022 4.4 3.5 - 5.2 g/dL Final   05/20/2022 4.0 3.5 - 5.0 g/dL Final     Alkaline Phosphatase   Date Value Ref Range Status   10/08/2021 59 45 - 120 U/L Final     Creatinine   Date Value Ref Range Status   08/16/2022 1.00 (H) 0.51 - 0.95 mg/dL Final     GFR Estimate   Date Value Ref Range Status   08/16/2022 65 >60 mL/min/1.73m2 Final     Comment:     Effective December 21, 2021 eGFRcr in adults is calculated using the 2021 CKD-EPI creatinine equation which includes age and gender (Branden et al., NEJM, DOI: 10.1056/[id number])   05/04/2021 58 (L) >60 mL/min/1.73m2 Final     GFR Estimate If Black   Date Value Ref Range Status   05/04/2021 >60 >60 mL/min/1.73m2 Final

## 2022-08-23 DIAGNOSIS — I10 ESSENTIAL HYPERTENSION: ICD-10-CM

## 2022-08-25 RX ORDER — HYDROCHLOROTHIAZIDE 25 MG/1
25 TABLET ORAL DAILY
Qty: 90 TABLET | Refills: 0 | Status: SHIPPED | OUTPATIENT
Start: 2022-08-25 | End: 2022-11-29

## 2022-08-25 NOTE — TELEPHONE ENCOUNTER
"Routing refill request to provider for review/approval because:  Labs out of range:  Creatinine    Last Written Prescription Date:  6/1/22  Last Fill Quantity: 90,  # refills: 0   Last office visit provider:  12/16/21     Requested Prescriptions   Pending Prescriptions Disp Refills     hydrochlorothiazide (HYDRODIURIL) 25 MG tablet 90 tablet 0     Sig: Take 1 tablet (25 mg) by mouth daily       Diuretics (Including Combos) Protocol Failed - 8/23/2022  1:30 PM        Failed - Normal serum creatinine on file in past 12 months     Recent Labs   Lab Test 08/16/22  0806   CR 1.00*              Passed - Blood pressure under 140/90 in past 12 months     BP Readings from Last 3 Encounters:   08/17/22 130/70   02/17/22 138/80   12/16/21 134/82                 Passed - Recent (12 mo) or future (30 days) visit within the authorizing provider's specialty     Patient has had an office visit with the authorizing provider or a provider within the authorizing providers department within the previous 12 mos or has a future within next 30 days. See \"Patient Info\" tab in inbasket, or \"Choose Columns\" in Meds & Orders section of the refill encounter.              Passed - Medication is active on med list        Passed - Patient is age 18 or older        Passed - No active pregancy on record        Passed - Normal serum potassium on file in past 12 months     Recent Labs   Lab Test 10/08/21  0841   POTASSIUM 4.0                    Passed - Normal serum sodium on file in past 12 months     Recent Labs   Lab Test 10/08/21  0841                 Passed - No positive pregnancy test in past 12 months             Sivan Perales RN 08/25/22 8:28 AM  "

## 2022-08-26 ENCOUNTER — VIRTUAL VISIT (OUTPATIENT)
Dept: FAMILY MEDICINE | Facility: CLINIC | Age: 57
End: 2022-08-26
Payer: COMMERCIAL

## 2022-08-26 ENCOUNTER — NURSE TRIAGE (OUTPATIENT)
Dept: NURSING | Facility: CLINIC | Age: 57
End: 2022-08-26

## 2022-08-26 DIAGNOSIS — K21.00 GASTROESOPHAGEAL REFLUX DISEASE WITH ESOPHAGITIS WITHOUT HEMORRHAGE: Primary | ICD-10-CM

## 2022-08-26 PROCEDURE — 99213 OFFICE O/P EST LOW 20 MIN: CPT | Mod: GT | Performed by: NURSE PRACTITIONER

## 2022-08-26 NOTE — TELEPHONE ENCOUNTER
Caller at high risk  Is due for second Covid booster and inquiring if she should wait for  next generation of  booster  anticipated  to be available in  > 1 month.  Caller advised that there is no firm  tmeline for roll out of next boosters and that if at high risk she  should be boosted now or consult her PCP for further advisement   caller understands and will comply   Brenda Eaton RN  FNA       Additional Information    Negative: Difficulty breathing or swallowing and starts within 2 hours after injection    Negative: Sounds like a life-threatening emergency to the triager    Negative: [1] Symptoms of COVID-19 (e.g., cough, fever, SOB, or others) AND [2] within 14 days of EXPOSURE (close contact) with diagnosed or suspected COVID-19 patient    Negative: [1] Symptoms of COVID-19 (e.g., cough, fever, SOB, or others) AND [2] within 14 days of being at a crowded indoor or outdoor event (e.g., concert, festival, rally, wedding)    Negative: Typical COVID-19 symptoms (e.g., cough, difficulty breathing, loss of taste or smell, runny nose, sore throat) that are NOT expected from vaccine    Negative: [1] COVID-19 exposure AND [2] no symptoms, or symptoms not typical of COVID-19    Negative: Fever > 104 F (40 C)    Negative: Sounds like a severe, unusual reaction to the triager    Negative: [1] Fever > 101 F (38.3 C) AND [2] over 60 years of age AND [3] started > 48 hours after getting vaccine    Negative: [1] Fever > 100.0 F (37.8 C) AND [2] bedridden (e.g., nursing home patient, CVA, chronic illness, recovering from surgery) AND [3] started > 48 hours after getting vaccine    Negative: [1] Fever > 100.0 F (37.8 C) AND [2] diabetes mellitus or weak immune system (e.g., HIV positive, cancer chemo, splenectomy, organ transplant, chronic steroids) AND [3] started > 48 hours after getting vaccine    Negative: [1] Fever > 100.0 F (37.8 C) AND [2] present > 3 days (72 hours)    Negative: [1] Fever > 100.0 F (37.8 C) AND [2]  healthcare worker    Negative: [1] Pain, tenderness, or swelling at the injection site AND [2] over 3 days (72 hours) since vaccine AND [3] getting worse    Negative: [1] Redness around the injection site AND [2] started > 48 hours after getting vaccine (Exception: red area < 1 inch or 2.5 cm wide)    Negative: [1] Pain, tenderness, or swelling at the injection site AND [2] lasts > 7 days    Negative: [1] Lymph node swelling (i.e., armpit or neck on side of vaccine) AND [2] lasts > 3 weeks    Negative: Requesting COVID-19 vaccine    Negative: Up-to-date on COVID-19 vaccination and exposure to COVID-19, Frequently Asked Questions (FAQs)    Negative: COVID-19 vaccine, Frequently Asked Questions (FAQs)    Negative: COVID-19 vaccine, systemic reactions (e.g., fatigue, fever, muscle aches), questions about    Negative: COVID-19 vaccine, injection site reaction (e.g., pain, redness, swelling), question about    Negative: COVID-19 Prevention and Healthy Living, questions about    Protocols used: CORONAVIRUS (COVID-19) VACCINE QUESTIONS AND [id number]-S-ON 1.18.2022

## 2022-08-26 NOTE — PROGRESS NOTES
Isabela is a 57 year old who is being evaluated via a billable video visit.      How would you like to obtain your AVS? MyChart  If the video visit is dropped, the invitation should be resent by: Text to cell phone: [phone]  Will anyone else be joining your video visit? No          Assessment & Plan     Gastroesophageal reflux disease with esophagitis without hemorrhage  Symptoms consistent with reflux. Trial omeprazole and wean over to pepcid after 1 month. If no improvement follow up in clinic.   - REVIEW OF HEALTH MAINTENANCE PROTOCOL ORDERS  - omeprazole (PRILOSEC) 20 MG DR capsule; Take 1 capsule (20 mg) by mouth daily for 30 days       Return in about 4 weeks (around 9/23/2022) for ongoing symptoms if not improving.    DEMETRIUS Robbins CNP  [facility]   Isabela is a 57 year old, presenting for the following health issues:  Abdominal Pain (Severe heartburn issues if eating to fast or to spicy )      HPI     GERD/Heartburn  Onset/Duration: 3 weeks  Description: heart burn when eating to fast or to spicy of food gets pain  Intensity: moderate, severe  Progression of Symptoms: worsening and intermittent  Accompanying Signs & Symptoms:  Does it feel like food gets stuck or trouble swallowing: YES- little  Nausea: No  Vomiting (bloody?): No  Abdominal Pain: No  Black-Tarry stools: No  Bloody stools: No  History:  Previous similar episodes: No  Previous ulcers: No  Precipitating factors:   Caffeine use: YES- 2 times a week  Alcohol use: No  NSAID/Aspirin use: No  Tobacco use: No  Worse with spicy foods and eating to fast.  Alleviating factors: None  Therapies tried and outcome:             Lifestyle changes: job change has been walking            Medications: none    Worsened over the last few weeks. She is believing that it may be related to her allergy induced asthma. She reports that her asthma is triggered with a cough. GERD has come and gone.   She has not done any  daily acid reducer. She has used TUMS with short term mild relief. Spicy foods will cause some symptoms as well. She denies any nocturnal symptoms. She has been wearing her CPAP.       Review of Systems   Constitutional, HEENT, cardiovascular, pulmonary, gi and gu systems are negative, except as otherwise noted.      Objective           Vitals:  No vitals were obtained today due to virtual visit.    Physical Exam   GENERAL: Healthy, alert and no distress  EYES: Eyes grossly normal to inspection.  No discharge or erythema, or obvious scleral/conjunctival abnormalities.  RESP: No audible wheeze, cough, or visible cyanosis.  No visible retractions or increased work of breathing.    SKIN: Visible skin clear. No significant rash, abnormal pigmentation or lesions.  NEURO: Cranial nerves grossly intact.  Mentation and speech appropriate for age.  PSYCH: Mentation appears normal, affect normal/bright, judgement and insight intact, normal speech and appearance well-groomed.          Video-Visit Details    Video Start Time: 3:35 PM    Type of service:  Video Visit    Video End Time:      Originating Location (pt. Location): Home    Distant Location (provider location):  [facility]     Platform used for Video Visit: [employer]    Bubba Pink

## 2022-09-01 ENCOUNTER — IMMUNIZATION (OUTPATIENT)
Dept: NURSING | Facility: CLINIC | Age: 57
End: 2022-09-01
Payer: COMMERCIAL

## 2022-09-01 PROCEDURE — 0054A COVID-19,PF,PFIZER (12+ YRS): CPT

## 2022-09-01 PROCEDURE — 91305 COVID-19,PF,PFIZER (12+ YRS): CPT

## 2022-09-12 DIAGNOSIS — M05.79 SEROPOSITIVE RHEUMATOID ARTHRITIS OF MULTIPLE SITES (H): ICD-10-CM

## 2022-09-13 RX ORDER — METHOTREXATE 2.5 MG/1
TABLET ORAL
Qty: 32 TABLET | Refills: 0 | Status: SHIPPED | OUTPATIENT
Start: 2022-09-13 | End: 2022-10-10

## 2022-09-13 NOTE — TELEPHONE ENCOUNTER
Routing refill request to provider for review/approval because:  Drug not on the G refill protocol     Last Written Prescription Date:  8/8/22  Last Fill Quantity: 32,  # refills: 0   Last office visit provider:  12/16/21     Requested Prescriptions   Pending Prescriptions Disp Refills     methotrexate sodium 2.5 MG TABS [Pharmacy Med Name: Methotrexate Sodium Oral Tablet 2.5 MG] 32 tablet 0     Sig: TAKE EIGHT TABLETS BY MOUTH ONE TIME WEEKLY       There is no refill protocol information for this order          Doni Lo RN 09/13/22 11:52 AM

## 2022-10-02 ENCOUNTER — HEALTH MAINTENANCE LETTER (OUTPATIENT)
Age: 57
End: 2022-10-02

## 2022-10-09 DIAGNOSIS — M05.79 SEROPOSITIVE RHEUMATOID ARTHRITIS OF MULTIPLE SITES (H): ICD-10-CM

## 2022-10-09 RX ORDER — METHOTREXATE 2.5 MG/1
TABLET ORAL
Qty: 32 TABLET | Refills: 0 | OUTPATIENT
Start: 2022-10-09

## 2022-10-10 RX ORDER — METHOTREXATE 2.5 MG/1
TABLET ORAL
Qty: 32 TABLET | Refills: 0 | Status: SHIPPED | OUTPATIENT
Start: 2022-10-10 | End: 2022-11-07

## 2022-10-25 ENCOUNTER — OFFICE VISIT (OUTPATIENT)
Dept: FAMILY MEDICINE | Facility: CLINIC | Age: 57
End: 2022-10-25
Payer: COMMERCIAL

## 2022-10-25 VITALS
DIASTOLIC BLOOD PRESSURE: 64 MMHG | WEIGHT: 225.8 LBS | HEART RATE: 99 BPM | HEIGHT: 66 IN | OXYGEN SATURATION: 98 % | SYSTOLIC BLOOD PRESSURE: 136 MMHG | BODY MASS INDEX: 36.29 KG/M2

## 2022-10-25 DIAGNOSIS — I10 ESSENTIAL HYPERTENSION: ICD-10-CM

## 2022-10-25 DIAGNOSIS — M05.79 SEROPOSITIVE RHEUMATOID ARTHRITIS OF MULTIPLE SITES (H): ICD-10-CM

## 2022-10-25 DIAGNOSIS — E66.01 MORBID OBESITY (H): ICD-10-CM

## 2022-10-25 DIAGNOSIS — J45.20 MILD INTERMITTENT ASTHMA WITHOUT COMPLICATION: ICD-10-CM

## 2022-10-25 DIAGNOSIS — Z23 NEED FOR PROPHYLACTIC VACCINATION AND INOCULATION AGAINST INFLUENZA: ICD-10-CM

## 2022-10-25 DIAGNOSIS — E78.5 DYSLIPIDEMIA: ICD-10-CM

## 2022-10-25 DIAGNOSIS — Z00.00 ROUTINE GENERAL MEDICAL EXAMINATION AT A HEALTH CARE FACILITY: Primary | ICD-10-CM

## 2022-10-25 DIAGNOSIS — G47.33 OSA (OBSTRUCTIVE SLEEP APNEA): ICD-10-CM

## 2022-10-25 DIAGNOSIS — M15.0 PRIMARY OSTEOARTHRITIS INVOLVING MULTIPLE JOINTS: ICD-10-CM

## 2022-10-25 DIAGNOSIS — E03.9 ACQUIRED HYPOTHYROIDISM: ICD-10-CM

## 2022-10-25 DIAGNOSIS — E55.9 VITAMIN D DEFICIENCY: ICD-10-CM

## 2022-10-25 DIAGNOSIS — Z11.4 SCREENING FOR HIV (HUMAN IMMUNODEFICIENCY VIRUS): ICD-10-CM

## 2022-10-25 DIAGNOSIS — R76.8 POSITIVE ANTI-CCP TEST: ICD-10-CM

## 2022-10-25 PROCEDURE — 99214 OFFICE O/P EST MOD 30 MIN: CPT | Mod: 25 | Performed by: NURSE PRACTITIONER

## 2022-10-25 PROCEDURE — 90471 IMMUNIZATION ADMIN: CPT | Performed by: NURSE PRACTITIONER

## 2022-10-25 PROCEDURE — 99396 PREV VISIT EST AGE 40-64: CPT | Mod: 25 | Performed by: NURSE PRACTITIONER

## 2022-10-25 PROCEDURE — 90682 RIV4 VACC RECOMBINANT DNA IM: CPT | Performed by: NURSE PRACTITIONER

## 2022-10-25 ASSESSMENT — ENCOUNTER SYMPTOMS
COUGH: 0
CHILLS: 0
PARESTHESIAS: 0
SORE THROAT: 0
BREAST MASS: 0
NERVOUS/ANXIOUS: 0
PALPITATIONS: 0
FREQUENCY: 0
HEADACHES: 0
DIARRHEA: 0
HEMATURIA: 0
ARTHRALGIAS: 1
MYALGIAS: 0
HEMATOCHEZIA: 0
DIZZINESS: 0
JOINT SWELLING: 0
FEVER: 0
CONSTIPATION: 0
WEAKNESS: 0
EYE PAIN: 0
NAUSEA: 0
SHORTNESS OF BREATH: 0
ABDOMINAL PAIN: 0

## 2022-10-25 ASSESSMENT — ASTHMA QUESTIONNAIRES
QUESTION_5 LAST FOUR WEEKS HOW WOULD YOU RATE YOUR ASTHMA CONTROL: COMPLETELY CONTROLLED
QUESTION_3 LAST FOUR WEEKS HOW OFTEN DID YOUR ASTHMA SYMPTOMS (WHEEZING, COUGHING, SHORTNESS OF BREATH, CHEST TIGHTNESS OR PAIN) WAKE YOU UP AT NIGHT OR EARLIER THAN USUAL IN THE MORNING: NOT AT ALL
QUESTION_4 LAST FOUR WEEKS HOW OFTEN HAVE YOU USED YOUR RESCUE INHALER OR NEBULIZER MEDICATION (SUCH AS ALBUTEROL): NOT AT ALL
ACT_TOTALSCORE: 25
QUESTION_2 LAST FOUR WEEKS HOW OFTEN HAVE YOU HAD SHORTNESS OF BREATH: NOT AT ALL
ACT_TOTALSCORE: 25
QUESTION_1 LAST FOUR WEEKS HOW MUCH OF THE TIME DID YOUR ASTHMA KEEP YOU FROM GETTING AS MUCH DONE AT WORK, SCHOOL OR AT HOME: NONE OF THE TIME

## 2022-10-25 NOTE — PROGRESS NOTES
SUBJECTIVE:   CC: Isabela is an 57 year old who presents for preventive health visit.       Patient has been advised of split billing requirements and indicates understanding: Yes  Healthy Habits:     Getting at least 3 servings of Calcium per day:  Yes    Bi-annual eye exam:  Yes    Dental care twice a year:  Yes    Sleep apnea or symptoms of sleep apnea:  Sleep apnea    Diet:  Regular (no restrictions)    Frequency of exercise:  4-5 days/week    Duration of exercise:  30-45 minutes    Taking medications regularly:  Yes    Barriers to taking medications:  None    Medication side effects:  Not applicable    PHQ-2 Total Score: 0    Additional concerns today:  Yes    -------------------------------------    Today's PHQ-2 Score:   PHQ-2 ( 1999 Pfizer) 10/25/2022   Q1: Little interest or pleasure in doing things 0   Q2: Feeling down, depressed or hopeless 0   PHQ-2 Score 0   PHQ-2 Total Score (12-17 Years)- Positive if 3 or more points; Administer PHQ-A if positive -   Q1: Little interest or pleasure in doing things Not at all   Q2: Feeling down, depressed or hopeless Not at all   PHQ-2 Score 0       Abuse: Current or Past (Physical, Sexual or Emotional) - No  Do you feel safe in your environment? Yes    Have you ever done Advance Care Planning? (For example, a Health Directive, POLST, or a discussion with a medical provider or your loved ones about your wishes): Yes, advance care planning is on file.    Social History     Tobacco Use     Smoking status: Never     Smokeless tobacco: Never   Substance Use Topics     Alcohol use: No         Alcohol Use 10/25/2022   Prescreen: >3 drinks/day or >7 drinks/week? No       Reviewed orders with patient.  Reviewed health maintenance and updated orders accordingly - Yes  Lab work is in process  Labs reviewed in EPIC    Breast Cancer Screening:    FHS-7:   Breast CA Risk Assessment (FHS-7) 4/5/2022 10/25/2022   Did any of your first-degree relatives have breast or ovarian cancer?  Yes Yes   Did any of your relatives have bilateral breast cancer? No No   Did any man in your family have breast cancer? No No   Did any woman in your family have breast and ovarian cancer? No Yes   Did any woman in your family have breast cancer before age 50 y? No No   Do you have 2 or more relatives with breast and/or ovarian cancer? No No   Do you have 2 or more relatives with breast and/or bowel cancer? No No     click delete button to remove this line now  Mammogram Screening: Recommended mammography every 1-2 years with patient discussion and risk factor consideration  Pertinent mammograms are reviewed under the imaging tab.    History of abnormal Pap smear: NO - age 30-65 PAP every 5 years with negative HPV co-testing recommended  PAP / HPV Latest Ref Rng & Units 10/1/2020 3/21/2017 7/31/2014   PAP Negative for squamous intraepithelial lesion or malignancy. Negative for squamous intraepithelial lesion or malignancy  Electronically signed by Ese Porter CT (ASCP) on 10/5/2020 at  2:13 PM   Negative for squamous intraepithelial lesion or malignancy  Electronically signed by Leslye Rader CT (ASCP) on 3/24/2017 at  2:49 PM   Negative for squamous intraepithelial lesion or malignancy  Electronically signed by Ese Porter CT (ASCP) on 8/8/2014 at  2:23 PM       Reviewed and updated as needed this visit by clinical staff   Tobacco  Allergies  Meds   Med Hx  Surg Hx  Fam Hx  Soc Hx        Reviewed and updated as needed this visit by Provider                 Past Medical History:   Diagnosis Date     Arthritis      Hypertension      Thyroid disease      Uncomplicated asthma       Past Surgical History:   Procedure Laterality Date     BIOPSY  2007    Thyroidectomy     COLONOSCOPY  2014     EYE SURGERY  2020    Eyelids     GYN SURGERY  2005    Fibroids     [place] THYROID LOBECTOMY,UNILAT      Description: Thyroid Surgery Thyroid Lobectomy Right Lobe;  Proc Date: 02/05/2007;       Review  "of Systems   Constitutional: Negative for chills and fever.   HENT: Negative for congestion, ear pain, hearing loss and sore throat.    Eyes: Negative for pain and visual disturbance.   Respiratory: Negative for cough and shortness of breath.    Cardiovascular: Negative for chest pain, palpitations and peripheral edema.   Gastrointestinal: Negative for abdominal pain, constipation, diarrhea, hematochezia and nausea.   Breasts:  Negative for tenderness, breast mass and discharge.   Genitourinary: Negative for frequency, genital sores, hematuria, pelvic pain, urgency, vaginal bleeding and vaginal discharge.   Musculoskeletal: Positive for arthralgias. Negative for joint swelling and myalgias.   Skin: Negative for rash.   Neurological: Negative for dizziness, weakness, headaches and paresthesias.   Psychiatric/Behavioral: Negative for mood changes. The patient is not nervous/anxious.         OBJECTIVE:   /64   Pulse 99   Ht 1.665 m (5' 5.55\")   Wt 102.4 kg (225 lb 12.8 oz)   SpO2 98%   BMI 36.95 kg/m    Physical Exam  GENERAL: healthy, alert and no distress  EYES: Eyes grossly normal to inspection, PERRL and conjunctivae and sclerae normal  HENT: ear canals and TM's normal, nose and mouth without ulcers or lesions  NECK: no adenopathy, no asymmetry, masses, or scars and thyroid normal to palpation  RESP: lungs clear to auscultation - no rales, rhonchi or wheezes  BREAST: normal without masses, tenderness or nipple discharge and no palpable axillary masses or adenopathy  CV: regular rate and rhythm, normal S1 S2, no S3 or S4, no murmur, click or rub, no peripheral edema and peripheral pulses strong  ABDOMEN: soft, nontender, no hepatosplenomegaly, no masses and bowel sounds normal  MS: no gross musculoskeletal defects noted, no edema  SKIN: no suspicious lesions or rashes  NEURO: Normal strength and tone, mentation intact and speech normal  PSYCH: mentation appears normal, affect normal/bright    Diagnostic " "Test Results:  Labs reviewed in Epic    ASSESSMENT/PLAN:       ICD-10-CM    1. Routine general medical examination at a health care facility  Z00.00       2. Screening for HIV (human immunodeficiency virus)  Z11.4 HIV Antigen Antibody Combo      3. Need for prophylactic vaccination and inoculation against influenza  Z23 INFLUENZA QUAD, RECOMBINANT, P-FREE (RIV4) (FLUBLOK) AGE 50-64 [FYW441]      4. YUVAL (obstructive sleep apnea)  G47.33       5. Hypertension  I10 Comprehensive metabolic panel (BMP + Alb, Alk Phos, ALT, AST, Total. Bili, TP)     lisinopril (ZESTRIL) 20 MG tablet      6. Dyslipidemia  E78.5 Lipid Profile (Chol, Trig, HDL, LDL calc)      7. Acquired hypothyroidism  E03.9       8. Morbid obesity (H)  E66.01       9. Seropositive rheumatoid arthritis of multiple sites (H)  M05.79       10. Primary osteoarthritis involving multiple joints  M15.9       11. Mild intermittent asthma without complication  J45.20       12. Essential hypertension  I10 lisinopril (ZESTRIL) 20 MG tablet      13. Vitamin D deficiency  E55.9       14. 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 Can stay on Lisinopril. I ordered CMP and lipids; GFR stable.   - Asthma under control. ACT reviewed   - Can continue to be managed by Rheumatology. Symptoms appear stable  - Plans to get routine labs completed with TSH in November. TSH stable in May 2022. Can continue Synthroid.  - YUVAL dx severe in 2/2022. Will continue to wear CPAP.   - weight loss and exercise discussed.   - vitamin D normal range 2020, please continue supplements.     Patient has been advised of split billing requirements and indicates understanding: Yes      COUNSELING:  Reviewed preventive health counseling, as reflected in patient instructions    Estimated body mass index is 36.95 kg/m  as calculated from the following:    Height as of this encounter: 1.665 m (5' 5.55\").    Weight as of this encounter: 102.4 kg (225 lb 12.8 " oz).    Weight management plan: Discussed healthy diet and exercise guidelines    She reports that she has never smoked. She has never used smokeless tobacco.      Counseling Resources:  ATP IV Guidelines  Pooled Cohorts Equation Calculator  Breast Cancer Risk Calculator  BRCA-Related Cancer Risk Assessment: FHS-7 Tool  FRAX Risk Assessment  ICSI Preventive Guidelines  Dietary Guidelines for Americans, 2010  USDA's MyPlate  ASA Prophylaxis  Lung CA Screening    DEMETRIUS [facility]

## 2022-10-28 RX ORDER — LISINOPRIL 20 MG/1
20 TABLET ORAL DAILY
Qty: 90 TABLET | Refills: 0 | Status: SHIPPED | OUTPATIENT
Start: 2022-10-28 | End: 2023-03-15

## 2022-11-04 ENCOUNTER — DOCUMENTATION ONLY (OUTPATIENT)
Dept: LAB | Facility: CLINIC | Age: 57
End: 2022-11-04

## 2022-11-04 DIAGNOSIS — M05.79 SEROPOSITIVE RHEUMATOID ARTHRITIS OF MULTIPLE SITES (H): Primary | ICD-10-CM

## 2022-11-15 ENCOUNTER — LAB (OUTPATIENT)
Dept: LAB | Facility: CLINIC | Age: 57
End: 2022-11-15
Payer: COMMERCIAL

## 2022-11-15 DIAGNOSIS — M05.79 SEROPOSITIVE RHEUMATOID ARTHRITIS OF MULTIPLE SITES (H): ICD-10-CM

## 2022-11-15 DIAGNOSIS — I10 ESSENTIAL HYPERTENSION: ICD-10-CM

## 2022-11-15 DIAGNOSIS — Z11.4 SCREENING FOR HIV (HUMAN IMMUNODEFICIENCY VIRUS): ICD-10-CM

## 2022-11-15 DIAGNOSIS — E78.5 DYSLIPIDEMIA: ICD-10-CM

## 2022-11-15 LAB
ALBUMIN SERPL BCG-MCNC: 4.3 G/DL (ref 3.5–5.2)
ALBUMIN SERPL BCG-MCNC: 4.3 G/DL (ref 3.5–5.2)
ALP SERPL-CCNC: 70 U/L (ref 35–104)
ALT SERPL W P-5'-P-CCNC: 16 U/L (ref 10–35)
ALT SERPL W P-5'-P-CCNC: 17 U/L (ref 10–35)
ANION GAP SERPL CALCULATED.3IONS-SCNC: 10 MMOL/L (ref 7–15)
AST SERPL W P-5'-P-CCNC: 35 U/L (ref 10–35)
BILIRUB SERPL-MCNC: 0.4 MG/DL
BUN SERPL-MCNC: 13.9 MG/DL (ref 8–23)
CALCIUM SERPL-MCNC: 8.9 MG/DL (ref 8.6–10)
CHLORIDE SERPL-SCNC: 104 MMOL/L (ref 98–107)
CHOLEST SERPL-MCNC: 220 MG/DL
CREAT SERPL-MCNC: 0.89 MG/DL (ref 0.51–0.95)
CREAT SERPL-MCNC: 0.93 MG/DL (ref 0.51–0.95)
DEPRECATED HCO3 PLAS-SCNC: 26 MMOL/L (ref 22–29)
ERYTHROCYTE [DISTWIDTH] IN BLOOD BY AUTOMATED COUNT: 13.4 % (ref 10–15)
GFR SERPL CREATININE-BSD FRML MDRD: 70 ML/MIN/1.73M2
GFR SERPL CREATININE-BSD FRML MDRD: 74 ML/MIN/1.73M2
GLUCOSE SERPL-MCNC: 107 MG/DL (ref 70–99)
HCT VFR BLD AUTO: 40.5 % (ref 35–47)
HDLC SERPL-MCNC: 63 MG/DL
HGB BLD-MCNC: 13.1 G/DL (ref 11.7–15.7)
HIV 1+2 AB+HIV1 P24 AG SERPL QL IA: NONREACTIVE
LDLC SERPL CALC-MCNC: 137 MG/DL
MCH RBC QN AUTO: 30.7 PG (ref 26.5–33)
MCHC RBC AUTO-ENTMCNC: 32.3 G/DL (ref 31.5–36.5)
MCV RBC AUTO: 95 FL (ref 78–100)
NONHDLC SERPL-MCNC: 157 MG/DL
PLATELET # BLD AUTO: 294 10E3/UL (ref 150–450)
POTASSIUM SERPL-SCNC: 3.8 MMOL/L (ref 3.4–5.3)
PROT SERPL-MCNC: 7.1 G/DL (ref 6.4–8.3)
RBC # BLD AUTO: 4.27 10E6/UL (ref 3.8–5.2)
SODIUM SERPL-SCNC: 140 MMOL/L (ref 136–145)
TRIGL SERPL-MCNC: 98 MG/DL
WBC # BLD AUTO: 5.4 10E3/UL (ref 4–11)

## 2022-11-15 PROCEDURE — 84460 ALANINE AMINO (ALT) (SGPT): CPT

## 2022-11-15 PROCEDURE — 85027 COMPLETE CBC AUTOMATED: CPT

## 2022-11-15 PROCEDURE — 36415 COLL VENOUS BLD VENIPUNCTURE: CPT

## 2022-11-15 PROCEDURE — 80053 COMPREHEN METABOLIC PANEL: CPT

## 2022-11-15 PROCEDURE — 82565 ASSAY OF CREATININE: CPT

## 2022-11-15 PROCEDURE — 80061 LIPID PANEL: CPT

## 2022-11-15 PROCEDURE — 87389 HIV-1 AG W/HIV-1&-2 AB AG IA: CPT

## 2022-11-28 ENCOUNTER — TRANSFERRED RECORDS (OUTPATIENT)
Dept: HEALTH INFORMATION MANAGEMENT | Facility: CLINIC | Age: 57
End: 2022-11-28

## 2022-11-28 ENCOUNTER — MYC REFILL (OUTPATIENT)
Dept: INTERNAL MEDICINE | Facility: CLINIC | Age: 57
End: 2022-11-28

## 2022-11-28 DIAGNOSIS — I10 ESSENTIAL HYPERTENSION: ICD-10-CM

## 2022-11-29 ENCOUNTER — MYC REFILL (OUTPATIENT)
Dept: FAMILY MEDICINE | Facility: CLINIC | Age: 57
End: 2022-11-29

## 2022-11-29 DIAGNOSIS — M05.79 SEROPOSITIVE RHEUMATOID ARTHRITIS OF MULTIPLE SITES (H): ICD-10-CM

## 2022-11-29 RX ORDER — METHOTREXATE 2.5 MG/1
TABLET ORAL
Qty: 96 TABLET | Refills: 0 | Status: SHIPPED | OUTPATIENT
Start: 2022-11-29 | End: 2023-05-01

## 2022-11-29 RX ORDER — HYDROCHLOROTHIAZIDE 25 MG/1
25 TABLET ORAL DAILY
Qty: 90 TABLET | Refills: 0 | OUTPATIENT
Start: 2022-11-29

## 2022-11-29 RX ORDER — HYDROCHLOROTHIAZIDE 25 MG/1
25 TABLET ORAL DAILY
Qty: 90 TABLET | Refills: 3 | Status: SHIPPED | OUTPATIENT
Start: 2022-11-29 | End: 2023-12-18

## 2022-11-29 NOTE — TELEPHONE ENCOUNTER
"Last Written Prescription Date:  8/25/22  Last Fill Quantity: 90,  # refills: 0   Last office visit provider:  10/25/22     Requested Prescriptions   Pending Prescriptions Disp Refills     hydrochlorothiazide (HYDRODIURIL) 25 MG tablet 90 tablet 0     Sig: Take 1 tablet (25 mg) by mouth daily       Diuretics (Including Combos) Protocol Passed - 11/29/2022  2:10 PM        Passed - Blood pressure under 140/90 in past 12 months     BP Readings from Last 3 Encounters:   10/25/22 136/64   08/17/22 130/70   02/17/22 138/80                 Passed - Recent (12 mo) or future (30 days) visit within the authorizing provider's specialty     Patient has had an office visit with the authorizing provider or a provider within the authorizing providers department within the previous 12 mos or has a future within next 30 days. See \"Patient Info\" tab in inbasket, or \"Choose Columns\" in Meds & Orders section of the refill encounter.              Passed - Medication is active on med list        Passed - Patient is age 18 or older        Passed - No active pregancy on record        Passed - Normal serum creatinine on file in past 12 months     Recent Labs   Lab Test 11/15/22  0757   CR 0.93              Passed - Normal serum potassium on file in past 12 months     Recent Labs   Lab Test 11/15/22  0757   POTASSIUM 3.8                    Passed - Normal serum sodium on file in past 12 months     Recent Labs   Lab Test 11/15/22  0757                 Passed - No positive pregnancy test in past 12 months             Doni Lo RN 11/29/22 2:10 PM  "

## 2022-12-13 NOTE — RESULT ENCOUNTER NOTE
Kendell Mar    It was a pleasure meeting you back in October.  I am sorry for the delay in getting back your lab results.     -Your cholesterol is stable at 220.  Your HDL is very good at 63.  According to American Heart Association heart risk calculator, you do not need to start a statin medication at this time.  Please continue to eat a diet that is low in saturated fats and get regular exercise.   The 10-year ASCVD risk score (Cherri GANNON, et al., 2019) is: 4.4%    Values used to calculate the score:      Age: 57 years      Sex: Female      Is Non- : No      Diabetic: No      Tobacco smoker: No      Systolic Blood Pressure: 136 mmHg      Is BP treated: Yes      HDL Cholesterol: 63 mg/dL      Total Cholesterol: 220 mg/dL    -Your kidney function is stable.   -Your liver function is stable.   -Your glucose is marginally elevated at 107.  If you are fasting for this test, this is now within the prediabetic range.  If you were not fasting for this test, this is indicated in the normal range.  If you were fasting for this blood test, then this is indicating prediabetes and you will want to work on eating a diet that is low in carbohydrates.  Regular exercise also helps decrease insulin resistance.  Please repeat this test within 1 year.     If you have any questions please let me know.     Constance

## 2022-12-25 DIAGNOSIS — Z79.899 HIGH RISK MEDICATION USE: ICD-10-CM

## 2022-12-25 DIAGNOSIS — M05.79 SEROPOSITIVE RHEUMATOID ARTHRITIS OF MULTIPLE SITES (H): ICD-10-CM

## 2022-12-26 RX ORDER — FOLIC ACID 1 MG/1
TABLET ORAL
Qty: 90 TABLET | Refills: 0 | Status: SHIPPED | OUTPATIENT
Start: 2022-12-26 | End: 2023-03-23

## 2023-01-01 ENCOUNTER — MYC MEDICAL ADVICE (OUTPATIENT)
Dept: INTERNAL MEDICINE | Facility: CLINIC | Age: 58
End: 2023-01-01

## 2023-01-02 DIAGNOSIS — E03.9 HYPOTHYROIDISM: ICD-10-CM

## 2023-01-02 NOTE — TELEPHONE ENCOUNTER
"Routing refill request to provider for review/approval because:  Patient needs to be seen because it has been more than 1 year since last office visit.    Last Written Prescription Date:  5/10/2022  Last Fill Quantity: 90,  # refills: 1   Last office visit provider:  10/5/2021     Requested Prescriptions   Pending Prescriptions Disp Refills     levothyroxine (SYNTHROID/LEVOTHROID) 125 MCG tablet 90 tablet 1     Sig: Take 1 tablet (125 mcg) by mouth daily       Thyroid Protocol Failed - 1/2/2023  4:58 PM        Failed - Recent (12 mo) or future (30 days) visit within the authorizing provider's specialty     Patient has had an office visit with the authorizing provider or a provider within the authorizing providers department within the previous 12 mos or has a future within next 30 days. See \"Patient Info\" tab in inbasket, or \"Choose Columns\" in Meds & Orders section of the refill encounter.              Passed - Patient is 12 years or older        Passed - Medication is active on med list        Passed - Normal TSH on file in past 12 months     Recent Labs   Lab Test 05/04/22  1135   TSH 0.31              Passed - No active pregnancy on record     If patient is pregnant or has had a positive pregnancy test, please check TSH.          Passed - No positive pregnancy test in past 12 months     If patient is pregnant or has had a positive pregnancy test, please check TSH.               Candelaria Nevarez RN 01/02/23 4:59 PM  "

## 2023-01-03 ENCOUNTER — IMMUNIZATION (OUTPATIENT)
Dept: NURSING | Facility: CLINIC | Age: 58
End: 2023-01-03
Payer: COMMERCIAL

## 2023-01-03 PROCEDURE — 0124A COVID-19 VACCINE BIVALENT BOOSTER 12+ (PFIZER): CPT

## 2023-01-03 PROCEDURE — 91312 COVID-19 VACCINE BIVALENT BOOSTER 12+ (PFIZER): CPT

## 2023-01-03 RX ORDER — LEVOTHYROXINE SODIUM 125 UG/1
125 TABLET ORAL DAILY
Qty: 90 TABLET | Refills: 1 | Status: SHIPPED | OUTPATIENT
Start: 2023-01-03 | End: 2023-06-21

## 2023-02-14 ENCOUNTER — LAB (OUTPATIENT)
Dept: LAB | Facility: CLINIC | Age: 58
End: 2023-02-14
Payer: COMMERCIAL

## 2023-02-14 DIAGNOSIS — M05.79 SEROPOSITIVE RHEUMATOID ARTHRITIS OF MULTIPLE SITES (H): ICD-10-CM

## 2023-02-14 LAB
ALBUMIN SERPL BCG-MCNC: 4.3 G/DL (ref 3.5–5.2)
ALT SERPL W P-5'-P-CCNC: 17 U/L (ref 10–35)
CREAT SERPL-MCNC: 0.94 MG/DL (ref 0.51–0.95)
ERYTHROCYTE [DISTWIDTH] IN BLOOD BY AUTOMATED COUNT: 13.6 % (ref 10–15)
GFR SERPL CREATININE-BSD FRML MDRD: 70 ML/MIN/1.73M2
HCT VFR BLD AUTO: 39.1 % (ref 35–47)
HGB BLD-MCNC: 12.6 G/DL (ref 11.7–15.7)
MCH RBC QN AUTO: 30.8 PG (ref 26.5–33)
MCHC RBC AUTO-ENTMCNC: 32.2 G/DL (ref 31.5–36.5)
MCV RBC AUTO: 96 FL (ref 78–100)
PLATELET # BLD AUTO: 255 10E3/UL (ref 150–450)
RBC # BLD AUTO: 4.09 10E6/UL (ref 3.8–5.2)
WBC # BLD AUTO: 5 10E3/UL (ref 4–11)

## 2023-02-14 PROCEDURE — 85027 COMPLETE CBC AUTOMATED: CPT

## 2023-02-14 PROCEDURE — 82040 ASSAY OF SERUM ALBUMIN: CPT

## 2023-02-14 PROCEDURE — 82565 ASSAY OF CREATININE: CPT

## 2023-02-14 PROCEDURE — 36415 COLL VENOUS BLD VENIPUNCTURE: CPT

## 2023-02-14 PROCEDURE — 84460 ALANINE AMINO (ALT) (SGPT): CPT

## 2023-02-16 ENCOUNTER — DOCUMENTATION ONLY (OUTPATIENT)
Dept: SLEEP MEDICINE | Facility: CLINIC | Age: 58
End: 2023-02-16

## 2023-02-16 ENCOUNTER — OFFICE VISIT (OUTPATIENT)
Dept: RHEUMATOLOGY | Facility: CLINIC | Age: 58
End: 2023-02-16
Payer: COMMERCIAL

## 2023-02-16 VITALS
WEIGHT: 226.6 LBS | HEART RATE: 88 BPM | BODY MASS INDEX: 37.08 KG/M2 | SYSTOLIC BLOOD PRESSURE: 130 MMHG | DIASTOLIC BLOOD PRESSURE: 70 MMHG

## 2023-02-16 DIAGNOSIS — M15.0 PRIMARY OSTEOARTHRITIS INVOLVING MULTIPLE JOINTS: ICD-10-CM

## 2023-02-16 DIAGNOSIS — M05.79 SEROPOSITIVE RHEUMATOID ARTHRITIS OF MULTIPLE SITES (H): Primary | ICD-10-CM

## 2023-02-16 DIAGNOSIS — N18.31 STAGE 3A CHRONIC KIDNEY DISEASE (H): ICD-10-CM

## 2023-02-16 DIAGNOSIS — Z79.899 HIGH RISK MEDICATION USE: ICD-10-CM

## 2023-02-16 DIAGNOSIS — G47.34 NOCTURNAL HYPOXEMIA: ICD-10-CM

## 2023-02-16 DIAGNOSIS — R76.8 POSITIVE ANTI-CCP TEST: ICD-10-CM

## 2023-02-16 DIAGNOSIS — G47.33 OSA (OBSTRUCTIVE SLEEP APNEA): Primary | ICD-10-CM

## 2023-02-16 PROCEDURE — 99214 OFFICE O/P EST MOD 30 MIN: CPT | Performed by: INTERNAL MEDICINE

## 2023-02-16 RX ORDER — SULFASALAZINE 500 MG/1
TABLET ORAL
Qty: 360 TABLET | Refills: 0 | Status: SHIPPED | OUTPATIENT
Start: 2023-02-16 | End: 2023-07-05

## 2023-02-16 RX ORDER — HYDROXYCHLOROQUINE SULFATE 200 MG/1
200 TABLET, FILM COATED ORAL 2 TIMES DAILY WITH MEALS
Qty: 180 TABLET | Refills: 1 | Status: SHIPPED | OUTPATIENT
Start: 2023-02-16 | End: 2023-08-17

## 2023-02-16 NOTE — PROGRESS NOTES
Rheumatology follow-up visit note     Isabela is a 57 year old female presents today for follow-up.    Isabela was seen today for recheck.    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    HPI    Isabela Olivo is a 57 year old female is here for follow-up of   rheumatoid arthritis.  This is polyarticular.  Positive anti-CCP antibody.  She has osteoarthritis.  She is doing great with the current triple regimen of hydroxychloroquine sulfasalazine and methotrexate with folic acid recent labs are normal.  Having worked [employer] she has taken the senior care and is very happy with that.  Besides volunteering she has joined gym, water aerobics and is very happy for that.  She has noted mild discomfort at the bases of her thumbs for which she does not even take Tylenol.  Recent labs are entirely normal.    She has been found to have sleep apnea and looking forward to getting the CPAP started.       DETAILED EXAMINATION  02/16/23  :    Vitals:    02/16/23 0858   BP: 130/70   Pulse: 88   Weight: 102.8 kg (226 lb 9.6 oz)     Alert oriented. Head including the face is examined for malar rash, heliotropes, scarring, lupus pernio. Eyes examined for redness such as in episcleritis/scleritis, periorbital lesions.   Neck/ Face examined for parotid gland swelling, range of motion of neck.  Left upper and lower and right upper and lower extremities examined for tenderness, swelling, warmth of the appendicular joints, range of motion, edema, rash.  Some of the important findings included: she does  not have evidence of synovitis in the palpable joints of the upper extremities.  No significant deformities of the digits.  Minimal  [place]ber[place] nodes.  Range of motion of the shoulders  show full abduction.  No JLT effusion or warmth of the knees.  she does not have dactylitis of the digits.     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 10/20/2016     Priority: Medium     Hypothyroidism      Priority: Medium     Created by Conversion  Replacement Utility updated for latest IMO load         Hypertension      Priority: Medium     Created by Conversion  Replacement Utility updated for latest IMO load         Vitamin D Deficiency      Priority: Medium     Created by Conversion  Replacement Utility updated for latest IMO load         Seropositive rheumatoid arthritis of multiple sites (H) 04/07/2016     Priority: Medium     High risk medication use 04/07/2016     Priority: Medium     Past Surgical History:   Procedure Laterality Date     BIOPSY  2007    Thyroidectomy     COLONOSCOPY  2014     EYE SURGERY  2020    Eyelids     GYN SURGERY  2005    Fibroids     [place] THYROID LOBECTOMY,UNILAT      Description: Thyroid Surgery Thyroid Lobectomy Right Lobe;  Proc Date: 02/05/2007;      Past Medical History:   Diagnosis Date     Arthritis      Hypertension      Thyroid disease      Uncomplicated asthma      Allergies   Allergen Reactions     Dilantin Infatabs [Phenytoin] Unknown     Phenytoin Sodium Extended [Phenytoin] Hives     Current Outpatient Medications   Medication Sig Dispense Refill     Bioflavonoid Products (VITAMIN C) CHEW         cholecalciferol, vitamin D3, (CHOLECALCIFEROL) 1,000 unit tablet [CHOLECALCIFEROL, VITAMIN D3, (CHOLECALCIFEROL) 1,000 UNIT TABLET] Take 1,000 Units by mouth daily.       folic acid (FOLVITE) 1 MG tablet TAKE ONE TABLET BY MOUTH ONE TIME DAILY 90 tablet 0     hydrochlorothiazide (HYDRODIURIL) 25 MG tablet Take 1 tablet (25 mg) by mouth daily 90 tablet 3     hydroxychloroquine (PLAQUENIL) 200 MG tablet Take 1 tablet (200 mg) by mouth 2 times daily (with meals) 180 tablet 1     levothyroxine (SYNTHROID/LEVOTHROID) 125 MCG tablet Take 1 tablet (125 mcg) by mouth daily 90 tablet 1     lisinopril (ZESTRIL) 20 MG tablet Take 1 tablet (20 mg) by mouth daily 90 tablet 0     methotrexate sodium 2.5 MG TABS TAKE EIGHT TABLETS BY MOUTH ONE TIME WEEKLY 96 tablet 0     sulfaSALAzine (AZULFIDINE) 500 MG tablet TAKE TWO TABLETS BY MOUTH TWICE DAILY 360 tablet 0     family history includes Asthma in her mother; Breast Cancer in her mother; Diabetes in her father; Heart Disease in her maternal grandfather; Hyperlipidemia in her father and mother; Hypertension in her father and mother; Osteoporosis in her mother; Sleep Apnea in her father.  Social Connections: Not on file          WBC Count   Date Value Ref Range Status   02/14/2023 5.0 4.0 - 11.0 10e3/uL Final     RBC Count   Date Value Ref Range Status   02/14/2023 4.09 3.80 - 5.20 10e6/uL Final     Hemoglobin   Date Value Ref Range Status   02/14/2023 12.6 11.7 - 15.7 g/dL Final     Hematocrit   Date Value Ref Range Status   02/14/2023 39.1 35.0 - 47.0 % Final     MCV   Date Value Ref Range Status   02/14/2023 96 78 - 100 fL Final     MCH   Date Value Ref Range Status   02/14/2023 30.8 26.5 - 33.0 pg Final     Platelet Count   Date Value Ref Range Status   02/14/2023 255 150 - 450 10e3/uL Final     AST   Date Value Ref Range Status   11/15/2022 35 10 - 35 U/L Final     ALT   Date Value Ref Range Status   02/14/2023 17 10 - 35 U/L Final     Albumin   Date Value Ref Range Status    02/14/2023 4.3 3.5 - 5.2 g/dL Final   05/20/2022 4.0 3.5 - 5.0 g/dL Final     Alkaline Phosphatase   Date Value Ref Range Status   11/15/2022 70 35 - 104 U/L Final     Creatinine   Date Value Ref Range Status   02/14/2023 0.94 0.51 - 0.95 mg/dL Final     GFR Estimate   Date Value Ref Range Status   02/14/2023 70 >60 mL/min/1.73m2 Final     Comment:     eGFR calculated using 2021 CKD-EPI equation.   05/04/2021 58 (L) >60 mL/min/1.73m2 Final     GFR Estimate If Black   Date Value Ref Range Status   05/04/2021 >60 >60 mL/min/1.73m2 Final

## 2023-02-16 NOTE — PROGRESS NOTES
Prescription renewed for patient for new mask/supplies.  Left message reminding her that she is due to be seen for follow-up with our scheduling number.Lisa Umanzor PA-C on 2/16/2023 at 5:50 PM

## 2023-02-23 ENCOUNTER — TRANSFERRED RECORDS (OUTPATIENT)
Dept: HEALTH INFORMATION MANAGEMENT | Facility: CLINIC | Age: 58
End: 2023-02-23
Payer: COMMERCIAL

## 2023-03-14 DIAGNOSIS — I10 ESSENTIAL HYPERTENSION: ICD-10-CM

## 2023-03-15 RX ORDER — LISINOPRIL 20 MG/1
20 TABLET ORAL DAILY
Qty: 90 TABLET | Refills: 2 | Status: SHIPPED | OUTPATIENT
Start: 2023-03-15 | End: 2024-01-09

## 2023-03-15 NOTE — TELEPHONE ENCOUNTER
"    Last Written Prescription Date:  10/28/2022  Last Fill Quantity: 90,  # refills: 0   Last office visit provider:  10/25/2022     Requested Prescriptions   Pending Prescriptions Disp Refills     lisinopril (ZESTRIL) 20 MG tablet 90 tablet 0     Sig: Take 1 tablet (20 mg) by mouth daily       ACE Inhibitors (Including Combos) Protocol Failed - 3/15/2023  9:25 AM        Failed - Recent (12 mo) or future (30 days) visit within the authorizing provider's specialty     Patient has had an office visit with the authorizing provider or a provider within the authorizing providers department within the previous 12 mos or has a future within next 30 days. See \"Patient Info\" tab in inbasket, or \"Choose Columns\" in Meds & Orders section of the refill encounter.              Passed - Blood pressure under 140/90 in past 12 months     BP Readings from Last 3 Encounters:   02/16/23 130/70   10/25/22 136/64   08/17/22 130/70                 Passed - Medication is active on med list        Passed - Patient is age 18 or older        Passed - No active pregnancy on record        Passed - Normal serum creatinine on file in past 12 months     Recent Labs   Lab Test 02/14/23  0809   CR 0.94       Ok to refill medication if creatinine is low          Passed - Normal serum potassium on file in past 12 months     Recent Labs   Lab Test 11/15/22  0757   POTASSIUM 3.8             Passed - No positive pregnancy test within past 12 months             Edel Nick RN 03/15/23 9:25 AM  "

## 2023-03-23 DIAGNOSIS — Z79.899 HIGH RISK MEDICATION USE: ICD-10-CM

## 2023-03-23 DIAGNOSIS — M05.79 SEROPOSITIVE RHEUMATOID ARTHRITIS OF MULTIPLE SITES (H): ICD-10-CM

## 2023-03-23 RX ORDER — FOLIC ACID 1 MG/1
TABLET ORAL
Qty: 90 TABLET | Refills: 0 | Status: SHIPPED | OUTPATIENT
Start: 2023-03-23 | End: 2023-06-22

## 2023-04-06 ENCOUNTER — ANCILLARY PROCEDURE (OUTPATIENT)
Dept: MAMMOGRAPHY | Facility: CLINIC | Age: 58
End: 2023-04-06
Attending: INTERNAL MEDICINE
Payer: COMMERCIAL

## 2023-04-06 DIAGNOSIS — Z12.31 VISIT FOR SCREENING MAMMOGRAM: ICD-10-CM

## 2023-04-06 PROCEDURE — 77067 SCR MAMMO BI INCL CAD: CPT

## 2023-04-29 DIAGNOSIS — M05.79 SEROPOSITIVE RHEUMATOID ARTHRITIS OF MULTIPLE SITES (H): ICD-10-CM

## 2023-05-15 ASSESSMENT — SLEEP AND FATIGUE QUESTIONNAIRES
HOW LIKELY ARE YOU TO NOD OFF OR FALL ASLEEP WHILE SITTING QUIETLY AFTER LUNCH WITHOUT ALCOHOL: SLIGHT CHANCE OF DOZING
HOW LIKELY ARE YOU TO NOD OFF OR FALL ASLEEP WHILE LYING DOWN TO REST IN THE AFTERNOON WHEN CIRCUMSTANCES PERMIT: MODERATE CHANCE OF DOZING
HOW LIKELY ARE YOU TO NOD OFF OR FALL ASLEEP WHILE SITTING AND READING: SLIGHT CHANCE OF DOZING
HOW LIKELY ARE YOU TO NOD OFF OR FALL ASLEEP WHEN YOU ARE A PASSENGER IN A CAR FOR AN HOUR WITHOUT A BREAK: MODERATE CHANCE OF DOZING
HOW LIKELY ARE YOU TO NOD OFF OR FALL ASLEEP WHILE SITTING INACTIVE IN A PUBLIC PLACE: WOULD NEVER DOZE
HOW LIKELY ARE YOU TO NOD OFF OR FALL ASLEEP WHILE WATCHING TV: WOULD NEVER DOZE
HOW LIKELY ARE YOU TO NOD OFF OR FALL ASLEEP WHILE SITTING AND TALKING TO SOMEONE: WOULD NEVER DOZE
HOW LIKELY ARE YOU TO NOD OFF OR FALL ASLEEP IN A CAR, WHILE STOPPED FOR A FEW MINUTES IN TRAFFIC: WOULD NEVER DOZE

## 2023-05-16 ENCOUNTER — LAB (OUTPATIENT)
Dept: LAB | Facility: CLINIC | Age: 58
End: 2023-05-16
Payer: COMMERCIAL

## 2023-05-16 DIAGNOSIS — M05.79 SEROPOSITIVE RHEUMATOID ARTHRITIS OF MULTIPLE SITES (H): ICD-10-CM

## 2023-05-16 LAB
ALBUMIN SERPL BCG-MCNC: 4.5 G/DL (ref 3.5–5.2)
ALT SERPL W P-5'-P-CCNC: 19 U/L (ref 10–35)
CREAT SERPL-MCNC: 0.98 MG/DL (ref 0.51–0.95)
ERYTHROCYTE [DISTWIDTH] IN BLOOD BY AUTOMATED COUNT: 13.6 % (ref 10–15)
GFR SERPL CREATININE-BSD FRML MDRD: 66 ML/MIN/1.73M2
HCT VFR BLD AUTO: 41.7 % (ref 35–47)
HGB BLD-MCNC: 13.6 G/DL (ref 11.7–15.7)
MCH RBC QN AUTO: 31.1 PG (ref 26.5–33)
MCHC RBC AUTO-ENTMCNC: 32.6 G/DL (ref 31.5–36.5)
MCV RBC AUTO: 95 FL (ref 78–100)
PLATELET # BLD AUTO: 280 10E3/UL (ref 150–450)
RBC # BLD AUTO: 4.38 10E6/UL (ref 3.8–5.2)
WBC # BLD AUTO: 5 10E3/UL (ref 4–11)

## 2023-05-16 PROCEDURE — 82565 ASSAY OF CREATININE: CPT

## 2023-05-16 PROCEDURE — 85027 COMPLETE CBC AUTOMATED: CPT

## 2023-05-16 PROCEDURE — 36415 COLL VENOUS BLD VENIPUNCTURE: CPT

## 2023-05-16 PROCEDURE — 82040 ASSAY OF SERUM ALBUMIN: CPT

## 2023-05-16 PROCEDURE — 84460 ALANINE AMINO (ALT) (SGPT): CPT

## 2023-05-22 ENCOUNTER — VIRTUAL VISIT (OUTPATIENT)
Dept: SLEEP MEDICINE | Facility: CLINIC | Age: 58
End: 2023-05-22
Payer: COMMERCIAL

## 2023-05-22 VITALS — HEIGHT: 66 IN | BODY MASS INDEX: 34.55 KG/M2 | WEIGHT: 215 LBS

## 2023-05-22 DIAGNOSIS — G47.33 OSA (OBSTRUCTIVE SLEEP APNEA): Primary | ICD-10-CM

## 2023-05-22 PROCEDURE — 99214 OFFICE O/P EST MOD 30 MIN: CPT | Mod: VID | Performed by: PHYSICIAN ASSISTANT

## 2023-05-22 ASSESSMENT — PAIN SCALES - GENERAL: PAINLEVEL: NO PAIN (0)

## 2023-05-22 NOTE — NURSING NOTE
Is the patient currently in the state of [place]? YES    Visit mode:VIDEO    If the visit is dropped, the patient can be reconnected by: VIDEO VISIT: Send to e-mail at: [email or web address]    Will anyone else be joining the visit? NO    How would you like to obtain your AVS? MyChart    Are changes needed to the allergy or medication list? NO    Reason for visit: RECHECK    Has patient had flu shot for current/most recent flu season? If so, when? Yes: 10/25/2022    Pt does not check BP at home    LUIS Guadalupe/JAZZMINE

## 2023-05-22 NOTE — PROGRESS NOTES
Virtual Visit Details    Type of service:  Video Visit   Video visit start time 4:03PM  Video visit end time 4:25PM  Originating Location (pt. Location): Home    Distant Location (provider location):  Off-site  Platform used for Video Visit: [facility]   Outpatient Sleep Medicine  May 22, 2023       Name: Isabela Olivo MRN# [id number]   Age: 57 year old YOB: 1965            Assessment and Plan:   1. YUVAL (obstructive sleep apnea)    Patient's sleep apnea is adequately managed and well treated with current PAP settings [id number] with low residual AHI of 3.6 events per hour. Compliance is adequate. Tolerating PAP well overall. Daytime symptoms and nighttime sleep quality are improved. Will continue current therapy with goal of nightly use >4 hours. Follow-up in 1-2 years for routine CPAP follow-up or sooner as needed.   - Comprehensive DME       Chief Complaint      Chief Complaint   Patient presents with     RECHECK            History of Present Illness:     Isabela Olivo is a 57 year old female who presents to the clinic for follow-up of their severe obstructive sleep apnea treated with CPAP.     Originally diagnosed via HST on 2/2/22 (227#, BMI 36.92) showing AHI 36.6, supine AHI 64.5, left side AHI 35.9, right side AHI 26.3, oxygen artie 81%, 24.5 minutes spent <=88%.    Patient was set up with auto CPAP 5-15 cm H2O on 3/7/2022.     Presents today for routine CPAP follow-up. Reports took her about 6 months to get used to the machine but now part of her routine. Patient is using a nasal pillow mask. The mask is comfortable unless congested, some seasonal congestion but not bothersome. The mask is not leaking. They are not snoring with the mask on. They are not having gasp arousals.  They are not having significant oral/nasal dryness or epistaxis. The pressure settings are comfortable.     Bedtime is typically 11:30PM. Usually it takes about 20 minutes to fall asleep with  "the mask on. Wake time is typically 7:30AM.  Patient is using PAP therapy 7 hours per night. The patient is usually getting 7 hours of sleep per night.    Improvements noted with CPAP include \"going for longer periods of time before I wake up to go pee and just more restful\".     ResMed Auto-PAP 5-10 cmH2O download:  24 total days of use. 6 nonuse days. 23 days with >4 hours use.  Average use 6 hours 44 minutes per day. Median Leak 0.1 L/min. 95%ile Leak 6.5 L/min. CPAP 95% pressure 9.0cm. AHI 3.6    SCALES:   INSOMNIA: Insomnia Severity Score: 7   SLEEPINESS: [place] Sleepiness Score: 6    Past medical/surgical history, family history, social history, medications and allergies were reviewed.           Physical Examination:   Ht 1.664 m (5' 5.5\")   Wt 97.5 kg (215 lb)   BMI 35.23 kg/m    General appearance: Awake, alert, cooperative. Well groomed. Sitting comfortably in chair. In no apparent distress.  HEENT: Head: Normocephalic, atraumatic. Eyes:Conjunctiva clear. Sclera normal. Nose: External appearance without deformity.   Pulmonary:  Able to speak easily in full sentences. No cough or wheeze.   Skin:  No rashes or significant lesions on visible skin.   Neurologic: Alert, oriented x3.   Psychiatric: Mood euthymic. Affect congruent with full range and intensity.      CC:  Emily Bustillo PA-C  May 22, 2023     [facility]  [address] [phone]     [facility]  [address] [address]    Chart documentation was completed, in part, with [employer] voice-recognition software. Even though reviewed, some grammatical, spelling, and word errors may remain.    30 minutes spent on day of encounter doing chart review, history and exam, documentation, and further activities as noted above    "

## 2023-06-19 ENCOUNTER — TRANSFERRED RECORDS (OUTPATIENT)
Dept: HEALTH INFORMATION MANAGEMENT | Facility: CLINIC | Age: 58
End: 2023-06-19
Payer: COMMERCIAL

## 2023-06-21 DIAGNOSIS — M05.79 SEROPOSITIVE RHEUMATOID ARTHRITIS OF MULTIPLE SITES (H): ICD-10-CM

## 2023-06-21 DIAGNOSIS — E03.9 HYPOTHYROIDISM: ICD-10-CM

## 2023-06-21 DIAGNOSIS — Z79.899 HIGH RISK MEDICATION USE: ICD-10-CM

## 2023-06-21 RX ORDER — LEVOTHYROXINE SODIUM 125 UG/1
TABLET ORAL
Qty: 90 TABLET | Refills: 0 | Status: SHIPPED | OUTPATIENT
Start: 2023-06-21 | End: 2023-12-08

## 2023-06-22 RX ORDER — FOLIC ACID 1 MG/1
TABLET ORAL
Qty: 90 TABLET | Refills: 0 | Status: SHIPPED | OUTPATIENT
Start: 2023-06-22 | End: 2023-11-20

## 2023-06-22 NOTE — TELEPHONE ENCOUNTER
"Routing refill request to provider for review/approval because:  Labs not current:  TSH  Patient needs to be seen because it has been more than 1 year since last office visit.    Last Written Prescription Date:  1/3/2023  Last Fill Quantity: 90,  # refills: 1   Last office visit provider:  10/25/2022     Requested Prescriptions   Pending Prescriptions Disp Refills     levothyroxine (SYNTHROID/LEVOTHROID) 125 MCG tablet [Pharmacy Med Name: Levothyroxine Sodium Oral Tablet 125 MCG] 90 tablet 0     Sig: TAKE ONE TABLET BY MOUTH ONE TIME DAILY       Thyroid Protocol Failed - 6/21/2023  4:46 PM        Failed - Recent (12 mo) or future (30 days) visit within the authorizing provider's specialty     Patient has had an office visit with the authorizing provider or a provider within the authorizing providers department within the previous 12 mos or has a future within next 30 days. See \"Patient Info\" tab in inbasket, or \"Choose Columns\" in Meds & Orders section of the refill encounter.              Failed - Normal TSH on file in past 12 months     Recent Labs   Lab Test 05/04/22  1135   TSH 0.31              Passed - Patient is 12 years or older        Passed - Medication is active on med list        Passed - No active pregnancy on record     If patient is pregnant or has had a positive pregnancy test, please check TSH.          Passed - No positive pregnancy test in past 12 months     If patient is pregnant or has had a positive pregnancy test, please check TSH.               Anila Negron RN 06/21/23 7:19 PM  "

## 2023-07-04 DIAGNOSIS — M05.79 SEROPOSITIVE RHEUMATOID ARTHRITIS OF MULTIPLE SITES (H): ICD-10-CM

## 2023-07-05 RX ORDER — SULFASALAZINE 500 MG/1
TABLET ORAL
Qty: 360 TABLET | Refills: 0 | Status: SHIPPED | OUTPATIENT
Start: 2023-07-05 | End: 2023-11-20

## 2023-07-06 ASSESSMENT — ASTHMA QUESTIONNAIRES: ACT_TOTALSCORE: 25

## 2023-07-13 ENCOUNTER — OFFICE VISIT (OUTPATIENT)
Dept: INTERNAL MEDICINE | Facility: CLINIC | Age: 58
End: 2023-07-13
Payer: COMMERCIAL

## 2023-07-13 VITALS
BODY MASS INDEX: 35.62 KG/M2 | TEMPERATURE: 98.6 F | DIASTOLIC BLOOD PRESSURE: 63 MMHG | SYSTOLIC BLOOD PRESSURE: 119 MMHG | WEIGHT: 221.6 LBS | HEART RATE: 86 BPM | OXYGEN SATURATION: 97 % | HEIGHT: 66 IN | RESPIRATION RATE: 16 BRPM

## 2023-07-13 DIAGNOSIS — E03.9 HYPOTHYROIDISM, UNSPECIFIED TYPE: ICD-10-CM

## 2023-07-13 DIAGNOSIS — Z12.4 CERVICAL CANCER SCREENING: ICD-10-CM

## 2023-07-13 DIAGNOSIS — M25.512 CHRONIC LEFT SHOULDER PAIN: Primary | ICD-10-CM

## 2023-07-13 DIAGNOSIS — G89.29 CHRONIC LEFT SHOULDER PAIN: Primary | ICD-10-CM

## 2023-07-13 LAB — TSH SERPL DL<=0.005 MIU/L-ACNC: 3.62 UIU/ML (ref 0.3–4.2)

## 2023-07-13 PROCEDURE — 36415 COLL VENOUS BLD VENIPUNCTURE: CPT | Performed by: NURSE PRACTITIONER

## 2023-07-13 PROCEDURE — 99214 OFFICE O/P EST MOD 30 MIN: CPT | Performed by: NURSE PRACTITIONER

## 2023-07-13 PROCEDURE — 84443 ASSAY THYROID STIM HORMONE: CPT | Performed by: NURSE PRACTITIONER

## 2023-07-13 NOTE — PROGRESS NOTES
"  Assessment & Plan     Hypothyroidism, unspecified type  Due for labs  - TSH with free T4 reflex; Future    Cervical cancer screening  She will get her Pap smear done in October at her annual physical    Chronic left shoulder pain  Suspect shoulder impingement syndrome.  We will try physical therapy  - Physical Therapy Referral; Future    Patient Instructions   Recheck thyroid labs today.    Referral to PT placed.  There should be a telephone number listed below.    Follow-up in October for your physical exam.  You will be due for a Pap smear at that time.    Get your pneumococcal vaccine at that time as well      Jenaro Rich [facility]    [place] is a 57 year old, presenting for the following health issues:    Shoulder Pain (L Shoulder Pain)        7/13/2023     6:51 AM   Additional Questions   Roomed by Becki     Shoulder Pain    History of Present Illness       Reason for visit:  Persistent shoulder pain, possibly in rotator cuff  Symptom onset:  More than a month  Symptoms include:  Left shoulder pain, more towards back  Symptom intensity:  Moderate  Symptom progression:  Staying the same  Had these symptoms before:  No  What makes it worse:  Certain movements where raising arm above and back.  What makes it better:  No movement, heat    She eats 2-3 servings of fruits and vegetables daily.She consumes 0 sweetened beverage(s) daily.She exercises with enough effort to increase her heart rate 30 to 60 minutes per day.  She exercises with enough effort to increase her heart rate 5 days per week.   She is taking medications regularly.             Review of Systems   Constitutional, HEENT, cardiovascular, pulmonary, gi and gu systems are negative, except as otherwise noted.      Objective    /63   Pulse 86   Temp 98.6  F (37  C)   Resp 16   Ht 1.664 m (5' 5.5\")   Wt 100.5 kg (221 lb 9.6 oz)   SpO2 97%   BMI 36.32 kg/m    Body mass index is 36.32 " kg/m .  Physical Exam     Agustin/Neer's negative.  Slight discomfort with upside down can test.  Pain with palpation to the lateral and posterior deltoid region

## 2023-07-13 NOTE — PATIENT INSTRUCTIONS
Recheck thyroid labs today.    Referral to PT placed.  There should be a telephone number listed below.    Follow-up in October for your physical exam.  You will be due for a Pap smear at that time.    Get your pneumococcal vaccine at that time as well

## 2023-07-30 DIAGNOSIS — M05.79 SEROPOSITIVE RHEUMATOID ARTHRITIS OF MULTIPLE SITES (H): ICD-10-CM

## 2023-08-07 ENCOUNTER — THERAPY VISIT (OUTPATIENT)
Dept: PHYSICAL THERAPY | Facility: REHABILITATION | Age: 58
End: 2023-08-07
Payer: COMMERCIAL

## 2023-08-07 DIAGNOSIS — G89.29 CHRONIC LEFT SHOULDER PAIN: ICD-10-CM

## 2023-08-07 DIAGNOSIS — M25.512 CHRONIC LEFT SHOULDER PAIN: ICD-10-CM

## 2023-08-07 PROCEDURE — 97161 PT EVAL LOW COMPLEX 20 MIN: CPT | Mod: GP | Performed by: PHYSICAL THERAPIST

## 2023-08-07 PROCEDURE — 97110 THERAPEUTIC EXERCISES: CPT | Mod: GP | Performed by: PHYSICAL THERAPIST

## 2023-08-07 NOTE — PROGRESS NOTES
PHYSICAL THERAPY EVALUATION  Type of Visit: Evaluation    See electronic medical record for Abuse and Falls Screening details.    Subjective       Presenting condition or subjective complaint: January 2023 she got a gym membership and started water aerobics.  She has a HX of RA so wonders if some of this increase in activity has caused the pain.  Otherwise, insidious onset.  The pain wis intermittent and in left posterior shoulder, and it aches with certain motions. Pt is right handed.  Date of onset: 01/01/23    Relevant medical history: High blood pressure; Osteoarthritis; Rheumatoid arthritis; Thyroid problems   Dates & types of surgery: Tyroidectomy 2007    Prior diagnostic imaging/testing results:       Prior therapy history for the same diagnosis, illness or injury: No      Prior Level of Function  Transfers:   Ambulation:   ADL:   IADL:     Living Environment  Social support: With a significant other or spouse   Type of home: House   Stairs to enter the home: Yes 3 Is there a railing: Yes   Ramp: No   Stairs inside the home: Yes 10 Is there a railing: Yes   Help at home: None  Equipment owned:       Employment: No    Hobbies/Interests: reading, water aerobics, walking, travel    Patient goals for therapy: Sleep on side and reaching movements without pain.    Pain assessment:      Objective   SHOULDER EVALUATION  PAIN: Pain is Exacerbated By: reaching behind, sleep, at times out to side  Pain is Relieved By: cold, heat, and rest  INTEGUMENTARY (edema, incisions):   POSTURE:  Dowagers Hump, forward head  GAIT:   Weightbearing Status:   Assistive Device(s):   Gait Deviations:   BALANCE/PROPRIOCEPTION:   WEIGHTBEARING ALIGNMENT:   ROM:  cervical AROM : rotation R-36, L-44 pain.  Shoulder AROM WNL, pain with L ER at end of range, ext/IR: R=T7, L=T5, pain    STRENGTH:  L shoulder IR and ER 5-/5, pain with both, other B shoulder strength 5/5 throughout  FLEXIBILITY:   SPECIAL TESTS:    Left Right   Impingement      Neer's Negative     Hawkin's-Anthony Negative     Coracoid Impingement     Internal impingement     Labral     Anterior Slide     Dubois's     Crank     Instability     Apprehension (anterior)     Relocation (anterior)     Anterior Load & Shift     Posterior Load & Shift     Posterior instability (with 90 degrees flex)     Multi-Directional Instability      Sulcus     Biceps      Speed's     Rotator Cuff Tear     Drop Arm Negative     Belly Press     Lift off        PALPATION:  L: infraspinatus, posterior deltoid, teres  JOINT MOBILITY: WNL  CERVICAL SCREEN:     Assessment & Plan   CLINICAL IMPRESSIONS  Medical Diagnosis: Chronic Left Shoulder Pain    Treatment Diagnosis: chronic left shoulder pain   Impression/Assessment: Patient is a 58 year old female with left shoulder pain complaints.  The following significant findings have been identified: Pain, Decreased strength, Impaired muscle performance, and Decreased activity tolerance. These impairments interfere with their ability to perform self care tasks and household chores as compared to previous level of function.     Clinical Decision Making (Complexity):  Clinical Presentation: Stable/Uncomplicated  Clinical Presentation Rationale: based on medical and personal factors listed in PT evaluation  Clinical Decision Making (Complexity): Low complexity    PLAN OF CARE  Treatment Interventions:  Modalities: Ultrasound  Interventions: Manual Therapy, Neuromuscular Re-education, Therapeutic Activity, Therapeutic Exercise    Long Term Goals     PT Goal 1  Goal Identifier: sleep  Goal Description: Pt will be able to sleep through the night without waking due to pain in left shoulder  Rationale: to maximize safety and independence with performance of ADLs and functional tasks  Target Date: 11/04/23  PT Goal 2  Goal Identifier: dressing  Goal Description: Pt will be able to reach behind and put left arm in sleeve with less reported pain of 0-2/10  Rationale: to  maximize safety and independence with performance of ADLs and functional tasks  Target Date: 11/05/23      Frequency of Treatment: 1-2x/week  Duration of Treatment: 10 visits    Recommended Referrals to Other Professionals:   Education Assessment:   Learner/Method: Patient    Risks and benefits of evaluation/treatment have been explained.   Patient/Family/caregiver agrees with Plan of Care.     Evaluation Time:     PT Eval, Low Complexity Minutes (19107): 22       Signing Clinician: Michelle Barger PT

## 2023-08-14 ENCOUNTER — THERAPY VISIT (OUTPATIENT)
Dept: PHYSICAL THERAPY | Facility: REHABILITATION | Age: 58
End: 2023-08-14
Payer: COMMERCIAL

## 2023-08-14 DIAGNOSIS — G89.29 CHRONIC LEFT SHOULDER PAIN: Primary | ICD-10-CM

## 2023-08-14 DIAGNOSIS — M25.512 CHRONIC LEFT SHOULDER PAIN: Primary | ICD-10-CM

## 2023-08-14 PROCEDURE — 97140 MANUAL THERAPY 1/> REGIONS: CPT | Mod: GP

## 2023-08-14 PROCEDURE — 97110 THERAPEUTIC EXERCISES: CPT | Mod: GP

## 2023-08-15 ENCOUNTER — LAB (OUTPATIENT)
Dept: LAB | Facility: CLINIC | Age: 58
End: 2023-08-15
Payer: COMMERCIAL

## 2023-08-15 DIAGNOSIS — M05.79 SEROPOSITIVE RHEUMATOID ARTHRITIS OF MULTIPLE SITES (H): ICD-10-CM

## 2023-08-15 LAB
ALBUMIN SERPL BCG-MCNC: 4.5 G/DL (ref 3.5–5.2)
ALT SERPL W P-5'-P-CCNC: 20 U/L (ref 0–50)
CREAT SERPL-MCNC: 0.94 MG/DL (ref 0.51–0.95)
ERYTHROCYTE [DISTWIDTH] IN BLOOD BY AUTOMATED COUNT: 13.5 % (ref 10–15)
GFR SERPL CREATININE-BSD FRML MDRD: 69 ML/MIN/1.73M2
HCT VFR BLD AUTO: 42.2 % (ref 35–47)
HGB BLD-MCNC: 13.5 G/DL (ref 11.7–15.7)
MCH RBC QN AUTO: 30.8 PG (ref 26.5–33)
MCHC RBC AUTO-ENTMCNC: 32 G/DL (ref 31.5–36.5)
MCV RBC AUTO: 96 FL (ref 78–100)
PLATELET # BLD AUTO: 286 10E3/UL (ref 150–450)
RBC # BLD AUTO: 4.39 10E6/UL (ref 3.8–5.2)
WBC # BLD AUTO: 6.2 10E3/UL (ref 4–11)

## 2023-08-15 PROCEDURE — 82565 ASSAY OF CREATININE: CPT

## 2023-08-15 PROCEDURE — 82040 ASSAY OF SERUM ALBUMIN: CPT

## 2023-08-15 PROCEDURE — 84460 ALANINE AMINO (ALT) (SGPT): CPT

## 2023-08-15 PROCEDURE — 85027 COMPLETE CBC AUTOMATED: CPT

## 2023-08-15 PROCEDURE — 36415 COLL VENOUS BLD VENIPUNCTURE: CPT

## 2023-08-17 ENCOUNTER — OFFICE VISIT (OUTPATIENT)
Dept: RHEUMATOLOGY | Facility: CLINIC | Age: 58
End: 2023-08-17
Payer: COMMERCIAL

## 2023-08-17 VITALS — HEART RATE: 78 BPM | SYSTOLIC BLOOD PRESSURE: 124 MMHG | DIASTOLIC BLOOD PRESSURE: 78 MMHG

## 2023-08-17 DIAGNOSIS — M05.79 SEROPOSITIVE RHEUMATOID ARTHRITIS OF MULTIPLE SITES (H): Primary | ICD-10-CM

## 2023-08-17 DIAGNOSIS — M15.0 PRIMARY OSTEOARTHRITIS INVOLVING MULTIPLE JOINTS: ICD-10-CM

## 2023-08-17 DIAGNOSIS — Z79.899 HIGH RISK MEDICATION USE: ICD-10-CM

## 2023-08-17 DIAGNOSIS — R76.8 POSITIVE ANTI-CCP TEST: ICD-10-CM

## 2023-08-17 DIAGNOSIS — N18.31 STAGE 3A CHRONIC KIDNEY DISEASE (H): ICD-10-CM

## 2023-08-17 PROCEDURE — 99214 OFFICE O/P EST MOD 30 MIN: CPT | Performed by: INTERNAL MEDICINE

## 2023-08-17 RX ORDER — HYDROXYCHLOROQUINE SULFATE 200 MG/1
200 TABLET, FILM COATED ORAL 2 TIMES DAILY WITH MEALS
Qty: 180 TABLET | Refills: 1 | Status: SHIPPED | OUTPATIENT
Start: 2023-08-17 | End: 2024-02-22

## 2023-08-17 NOTE — PROGRESS NOTES
Rheumatology follow-up visit note     Isabela is a 58 year old female presents today for follow-up.    Diagnoses and all orders for this visit:    Seropositive rheumatoid arthritis of multiple sites (H)  -     hydroxychloroquine (PLAQUENIL) 200 MG tablet; Take 1 tablet (200 mg) by mouth 2 times daily (with meals)    High risk medication use    Positive anti-CCP test    Primary osteoarthritis involving multiple joints    Stage 3a chronic kidney disease (H)        Her rheumatoid arthritis is very well controlled with the current regimen.  She is very happy with the persistence that she has shown with her water aerobics and is been able to do more things be more flexible less stiff and has lost some weight as well.  She is going to stay the course.  Recent labs are normal.  We will meet here in 6 months labs every 3.    Follow up in 6 months.    HPI    Isabela Olivo is a 58 year old female is here for follow-up of rheumatoid arthritis.  This is polyarticular.  Positive anti-CCP antibody.  She has osteoarthritis.  She is doing great with the current triple regimen of hydroxychloroquine sulfasalazine and methotrexate with folic acid recent labs are normal.  Having worked [employer] she has taken the intermediate and is very happy with that.  Besides volunteering she has joined gym, water aerobics and is very happy for that.  She has noted mild discomfort at the bases of her thumbs for which she does not even take Tylenol.  Recent labs are entirely normal.  Although she does not care for the CPAP but it is helping her.      DETAILED EXAMINATION  08/17/23  :    Vitals:    08/17/23 1104   BP: 124/78   BP Location: Right arm   Patient Position: Sitting   Cuff Size: Adult Regular   Pulse: 78     Alert oriented. Head including the face is examined for malar rash, heliotropes, scarring, lupus pernio. Eyes examined for redness such as in episcleritis/scleritis, periorbital lesions.   Neck/ Face examined for parotid  gland swelling, range of motion of neck.  Left upper and lower and right upper and lower extremities examined for tenderness, swelling, warmth of the appendicular joints, range of motion, edema, rash.  Some of the important findings included: she does not have evidence of synovitis in the palpable joints of the upper extremities.  No significant deformities of the digits.  no Heberden nodes.  Range of motion of the shoulders  show full abduction.  No JLT effusion or warmth of the knees.  she does not have dactylitis of the digits.  She has Dupuytren's both sides which seem not to have progressed over the past year or so.     Patient Active Problem List    Diagnosis Date Noted    YUVAL (obstructive sleep apnea) 02/04/2022     Priority: Medium     Severe YUVAL      Morbid obesity (H) 10/05/2021     Priority: Medium    Asthma      Priority: Medium     Created by Conversion        Primary osteoarthritis involving multiple joints 11/01/2018     Priority: Medium    Dyslipidemia      Priority: Medium     Created by Conversion        Positive anti-CCP test 10/20/2016     Priority: Medium    Hypothyroidism      Priority: Medium     Created by Conversion  Replacement Utility updated for latest IMO load        Hypertension      Priority: Medium     Created by Conversion  Replacement Utility updated for latest IMO load        Vitamin D Deficiency      Priority: Medium     Created by Conversion  Replacement Utility updated for latest IMO load        Seropositive rheumatoid arthritis of multiple sites (H) 04/07/2016     Priority: Medium    High risk medication use 04/07/2016     Priority: Medium     Past Surgical History:   Procedure Laterality Date    BIOPSY  2007    Thyroidectomy    COLONOSCOPY  2014    EYE SURGERY  2020    Eyelids    GYN SURGERY  2005    Fibroids    IA THYROID LOBECTOMY,UNILAT      Description: Thyroid Surgery Thyroid Lobectomy Right Lobe;  Proc Date: 02/05/2007;      Past Medical History:   Diagnosis Date     Arthritis     Hypertension     Thyroid disease     Uncomplicated asthma      Allergies   Allergen Reactions    Dilantin Infatabs [Phenytoin] Unknown    Phenytoin Sodium Extended [Phenytoin] Hives     Current Outpatient Medications   Medication Sig Dispense Refill    Bioflavonoid Products (VITAMIN C) CHEW       cholecalciferol, vitamin D3, (CHOLECALCIFEROL) 1,000 unit tablet [CHOLECALCIFEROL, VITAMIN D3, (CHOLECALCIFEROL) 1,000 UNIT TABLET] Take 1,000 Units by mouth daily.      folic acid (FOLVITE) 1 MG tablet TAKE ONE TABLET BY MOUTH ONE TIME DAILY 90 tablet 0    hydrochlorothiazide (HYDRODIURIL) 25 MG tablet Take 1 tablet (25 mg) by mouth daily 90 tablet 3    hydroxychloroquine (PLAQUENIL) 200 MG tablet Take 1 tablet (200 mg) by mouth 2 times daily (with meals) 180 tablet 1    levothyroxine (SYNTHROID/LEVOTHROID) 125 MCG tablet TAKE ONE TABLET BY MOUTH ONE TIME DAILY 90 tablet 0    lisinopril (ZESTRIL) 20 MG tablet Take 1 tablet (20 mg) by mouth daily 90 tablet 2    methotrexate 2.5 MG tablet take 8 tablets by mouth once weekly 96 tablet 0    sulfaSALAzine (AZULFIDINE) 500 MG tablet Take 2 tablets by mouth twice daily 360 tablet 0     family history includes Asthma in her mother; Breast Cancer in her mother; Diabetes in her father; Heart Disease in her maternal grandfather; Hyperlipidemia in her father and mother; Hypertension in her father and mother; Osteoporosis in her mother; Sleep Apnea in her father.  Social Connections: Not on file          WBC Count   Date Value Ref Range Status   08/15/2023 6.2 4.0 - 11.0 10e3/uL Final     RBC Count   Date Value Ref Range Status   08/15/2023 4.39 3.80 - 5.20 10e6/uL Final     Hemoglobin   Date Value Ref Range Status   08/15/2023 13.5 11.7 - 15.7 g/dL Final     Hematocrit   Date Value Ref Range Status   08/15/2023 42.2 35.0 - 47.0 % Final     MCV   Date Value Ref Range Status   08/15/2023 96 78 - 100 fL Final     MCH   Date Value Ref Range Status   08/15/2023 30.8 26.5 -  33.0 pg Final     Platelet Count   Date Value Ref Range Status   08/15/2023 286 150 - 450 10e3/uL Final     AST   Date Value Ref Range Status   11/15/2022 35 10 - 35 U/L Final     ALT   Date Value Ref Range Status   08/15/2023 20 0 - 50 U/L Final     Comment:     Reference intervals for this test were updated on 6/12/2023 to more accurately reflect our healthy population. There may be differences in the flagging of prior results with similar values performed with this method. Interpretation of those prior results can be made in the context of the updated reference intervals.       Albumin   Date Value Ref Range Status   08/15/2023 4.5 3.5 - 5.2 g/dL Final   05/20/2022 4.0 3.5 - 5.0 g/dL Final     Alkaline Phosphatase   Date Value Ref Range Status   11/15/2022 70 35 - 104 U/L Final     Creatinine   Date Value Ref Range Status   08/15/2023 0.94 0.51 - 0.95 mg/dL Final     GFR Estimate   Date Value Ref Range Status   08/15/2023 69 >60 mL/min/1.73m2 Final   05/04/2021 58 (L) >60 mL/min/1.73m2 Final     GFR Estimate If Black   Date Value Ref Range Status   05/04/2021 >60 >60 mL/min/1.73m2 Final

## 2023-08-21 ENCOUNTER — THERAPY VISIT (OUTPATIENT)
Dept: PHYSICAL THERAPY | Facility: REHABILITATION | Age: 58
End: 2023-08-21
Payer: COMMERCIAL

## 2023-08-21 DIAGNOSIS — M25.512 CHRONIC LEFT SHOULDER PAIN: Primary | ICD-10-CM

## 2023-08-21 DIAGNOSIS — G89.29 CHRONIC LEFT SHOULDER PAIN: Primary | ICD-10-CM

## 2023-08-21 PROCEDURE — 97140 MANUAL THERAPY 1/> REGIONS: CPT | Mod: GP | Performed by: PHYSICAL THERAPIST

## 2023-08-21 PROCEDURE — 97110 THERAPEUTIC EXERCISES: CPT | Mod: GP | Performed by: PHYSICAL THERAPIST

## 2023-09-05 NOTE — PROGRESS NOTES
"      DISCHARGE  Reason for Discharge: Patient has met all goals.    Equipment Issued:     Discharge Plan: Patient to continue home program.    Referring Provider:  Jenaro Rich      08/21/23 0500   Appointment Info   Signing clinician's name / credentials Wei Huerta PT   Total/Authorized Visits 10   Visits Used 3   Medical Diagnosis Chronic Left Shoulder Pain   PT Tx Diagnosis chronic left shoulder pain   Precautions/Limitations RA   Progress Note/Certification   Onset of illness/injury or Date of Surgery 01/01/23   Therapy Frequency 1-2x/week   Predicted Duration 10 visits   PT Goal 1   Goal Identifier sleep   Goal Description Pt will be able to sleep through the night without waking due to pain in left shoulder   Rationale to maximize safety and independence with performance of ADLs and functional tasks   Target Date 11/04/23   PT Goal 2   Goal Identifier dressing   Goal Description Pt will be able to reach behind and put left arm in sleeve with less reported pain of 0-2/10   Rationale to maximize safety and independence with performance of ADLs and functional tasks   Target Date 11/05/23   Subjective Report   Subjective Report Should is doing a lot better. Thinks the MT last sesion was helpful. New shoulder exs also feel really good (isometrics are more achy and less sharp). Pain isn't as sharp as it was. P 3.   Objective Measure 1   Objective Measure SPADI: 29.23 on 8/7/23   Therapeutic Procedure/Exercise   Therapeutic Procedures: strength, endurance, ROM, flexibillity minutes (41874) 30   Ther Proc 1 stretches :standing: shoulder abd at wall, 30\" holds x3   Ther Proc 1 - Details chin tuck and scapular retraction, 15 reps + hold 10 seconds x 10 reps   Ther Proc 2 Shoulder ER and abduction isometrics: 1 x 5-8 x 10 second holds each on left   Ther Proc 2 - Details UBE: 2.5 minutes forward + 2.5 minutes reverse   Patient Response/Progress Pt did not tolerated S/L ER so this exs was terminated. She " "also had pain ER with standing IR with TB L2. With L1, no pain with IR in standing. Her HEP was updated/progressed today and she was told to report back to next therapist with how new exercises are going. Discussed sleeping positions and exercises in water aerobics   Ther Proc 3 rows with blue TB x10 with 5\" holds   Ther Proc 3 - Details S/L ER- terminated due to increased pain with and w/o weight.   Skilled Intervention Standing IR with yellow TB x15. Tried L2 TB but this increased pain   Manual Therapy   Manual Therapy: Mobilization, MFR, MLD, friction massage minutes (09238) 15   Manual Therapy 1 Soft Tissue Mobilization (STM)   Manual Therapy 1 - Details in sitting: MFR to L UT/middle trap/IS/rhomboid/lauren major   Education   Learner/Method Patient   Plan   Plan for next session continue scapular strength/stabilization, continue manual therapy L infraspinatus, teres major, posterior deltoid pending patient report on tolerance. Add serratus exs   Comments   Comments Assessment: Isabela reports feeling better since last session and states that she responded well to the MT. She has noticed less sharp pain since last session. She tolerated some exercises well today (rows and IE with L1), but did not tolerated ER in sidelying or IR with L2. Will continue to progress per POC.   Total Session Time   Timed Code Treatment Minutes 45   Total Treatment Time (sum of timed and untimed services) 45       "

## 2023-10-19 ASSESSMENT — ENCOUNTER SYMPTOMS
DIARRHEA: 0
ARTHRALGIAS: 0
HEMATOCHEZIA: 0
DYSURIA: 0
NERVOUS/ANXIOUS: 0
ABDOMINAL PAIN: 0
FREQUENCY: 0
HEADACHES: 0
SORE THROAT: 0
CHILLS: 0
WEAKNESS: 0
SHORTNESS OF BREATH: 0
NAUSEA: 0
BREAST MASS: 0
PALPITATIONS: 0
HEARTBURN: 0
COUGH: 0
JOINT SWELLING: 0
HEMATURIA: 0
MYALGIAS: 0
EYE PAIN: 0
DIZZINESS: 0
CONSTIPATION: 0
FEVER: 0
PARESTHESIAS: 0

## 2023-10-21 DIAGNOSIS — M05.79 SEROPOSITIVE RHEUMATOID ARTHRITIS OF MULTIPLE SITES (H): ICD-10-CM

## 2023-10-26 ENCOUNTER — OFFICE VISIT (OUTPATIENT)
Dept: INTERNAL MEDICINE | Facility: CLINIC | Age: 58
End: 2023-10-26
Payer: COMMERCIAL

## 2023-10-26 VITALS
BODY MASS INDEX: 36.54 KG/M2 | OXYGEN SATURATION: 96 % | RESPIRATION RATE: 18 BRPM | DIASTOLIC BLOOD PRESSURE: 72 MMHG | SYSTOLIC BLOOD PRESSURE: 130 MMHG | TEMPERATURE: 98.2 F | WEIGHT: 219.3 LBS | HEIGHT: 65 IN | HEART RATE: 85 BPM

## 2023-10-26 DIAGNOSIS — M05.79 SEROPOSITIVE RHEUMATOID ARTHRITIS OF MULTIPLE SITES (H): ICD-10-CM

## 2023-10-26 DIAGNOSIS — I10 ESSENTIAL HYPERTENSION: ICD-10-CM

## 2023-10-26 DIAGNOSIS — E66.01 MORBID OBESITY (H): ICD-10-CM

## 2023-10-26 DIAGNOSIS — G47.33 OSA (OBSTRUCTIVE SLEEP APNEA): ICD-10-CM

## 2023-10-26 DIAGNOSIS — E03.9 ACQUIRED HYPOTHYROIDISM: ICD-10-CM

## 2023-10-26 DIAGNOSIS — Z13.29 SCREENING FOR ENDOCRINE, METABOLIC AND IMMUNITY DISORDER: ICD-10-CM

## 2023-10-26 DIAGNOSIS — Z13.0 SCREENING FOR ENDOCRINE, METABOLIC AND IMMUNITY DISORDER: ICD-10-CM

## 2023-10-26 DIAGNOSIS — Z12.4 CERVICAL CANCER SCREENING: ICD-10-CM

## 2023-10-26 DIAGNOSIS — Z00.00 ANNUAL PHYSICAL EXAM: Primary | ICD-10-CM

## 2023-10-26 DIAGNOSIS — E78.5 DYSLIPIDEMIA: ICD-10-CM

## 2023-10-26 DIAGNOSIS — Z12.11 SPECIAL SCREENING FOR MALIGNANT NEOPLASMS, COLON: ICD-10-CM

## 2023-10-26 DIAGNOSIS — J45.20 MILD INTERMITTENT ASTHMA WITHOUT COMPLICATION: ICD-10-CM

## 2023-10-26 DIAGNOSIS — Z78.0 MENOPAUSE: ICD-10-CM

## 2023-10-26 DIAGNOSIS — Z13.228 SCREENING FOR ENDOCRINE, METABOLIC AND IMMUNITY DISORDER: ICD-10-CM

## 2023-10-26 DIAGNOSIS — R73.01 IMPAIRED FASTING GLUCOSE: ICD-10-CM

## 2023-10-26 LAB
ALBUMIN SERPL BCG-MCNC: 4.5 G/DL (ref 3.5–5.2)
ALBUMIN UR-MCNC: NEGATIVE MG/DL
ALP SERPL-CCNC: 64 U/L (ref 35–104)
ALT SERPL W P-5'-P-CCNC: 22 U/L (ref 0–50)
ANION GAP SERPL CALCULATED.3IONS-SCNC: 12 MMOL/L (ref 7–15)
APPEARANCE UR: CLEAR
AST SERPL W P-5'-P-CCNC: 44 U/L (ref 0–45)
BILIRUB SERPL-MCNC: 0.5 MG/DL
BILIRUB UR QL STRIP: NEGATIVE
BUN SERPL-MCNC: 13.3 MG/DL (ref 8–23)
CALCIUM SERPL-MCNC: 9.5 MG/DL (ref 8.8–10.2)
CHLORIDE SERPL-SCNC: 103 MMOL/L (ref 98–107)
CHOLEST SERPL-MCNC: 226 MG/DL
COLOR UR AUTO: YELLOW
CREAT SERPL-MCNC: 0.96 MG/DL (ref 0.51–0.95)
DEPRECATED HCO3 PLAS-SCNC: 23 MMOL/L (ref 22–29)
EGFRCR SERPLBLD CKD-EPI 2021: 67 ML/MIN/1.73M2
ERYTHROCYTE [DISTWIDTH] IN BLOOD BY AUTOMATED COUNT: 13.4 % (ref 10–15)
GLUCOSE SERPL-MCNC: 101 MG/DL (ref 70–99)
GLUCOSE UR STRIP-MCNC: NEGATIVE MG/DL
HCT VFR BLD AUTO: 43.2 % (ref 35–47)
HDLC SERPL-MCNC: 64 MG/DL
HGB BLD-MCNC: 13.9 G/DL (ref 11.7–15.7)
HGB UR QL STRIP: NEGATIVE
KETONES UR STRIP-MCNC: NEGATIVE MG/DL
LDLC SERPL CALC-MCNC: 135 MG/DL
LEUKOCYTE ESTERASE UR QL STRIP: NEGATIVE
MCH RBC QN AUTO: 30.8 PG (ref 26.5–33)
MCHC RBC AUTO-ENTMCNC: 32.2 G/DL (ref 31.5–36.5)
MCV RBC AUTO: 96 FL (ref 78–100)
NITRATE UR QL: NEGATIVE
NONHDLC SERPL-MCNC: 162 MG/DL
PH UR STRIP: 5.5 [PH] (ref 5–8)
PLATELET # BLD AUTO: 274 10E3/UL (ref 150–450)
POTASSIUM SERPL-SCNC: 4.5 MMOL/L (ref 3.4–5.3)
PROT SERPL-MCNC: 7.5 G/DL (ref 6.4–8.3)
RBC # BLD AUTO: 4.51 10E6/UL (ref 3.8–5.2)
SODIUM SERPL-SCNC: 138 MMOL/L (ref 135–145)
SP GR UR STRIP: 1.02 (ref 1–1.03)
TRIGL SERPL-MCNC: 133 MG/DL
TSH SERPL DL<=0.005 MIU/L-ACNC: 3.14 UIU/ML (ref 0.3–4.2)
UROBILINOGEN UR STRIP-ACNC: 0.2 E.U./DL
VIT D+METAB SERPL-MCNC: 49 NG/ML (ref 20–50)
WBC # BLD AUTO: 5.2 10E3/UL (ref 4–11)

## 2023-10-26 PROCEDURE — 90472 IMMUNIZATION ADMIN EACH ADD: CPT | Performed by: INTERNAL MEDICINE

## 2023-10-26 PROCEDURE — 90471 IMMUNIZATION ADMIN: CPT | Performed by: INTERNAL MEDICINE

## 2023-10-26 PROCEDURE — 99214 OFFICE O/P EST MOD 30 MIN: CPT | Mod: 25 | Performed by: INTERNAL MEDICINE

## 2023-10-26 PROCEDURE — 85027 COMPLETE CBC AUTOMATED: CPT | Performed by: INTERNAL MEDICINE

## 2023-10-26 PROCEDURE — 80053 COMPREHEN METABOLIC PANEL: CPT | Performed by: INTERNAL MEDICINE

## 2023-10-26 PROCEDURE — 84443 ASSAY THYROID STIM HORMONE: CPT | Performed by: INTERNAL MEDICINE

## 2023-10-26 PROCEDURE — G0145 SCR C/V CYTO,THINLAYER,RESCR: HCPCS | Performed by: INTERNAL MEDICINE

## 2023-10-26 PROCEDURE — 90677 PCV20 VACCINE IM: CPT | Performed by: INTERNAL MEDICINE

## 2023-10-26 PROCEDURE — 83036 HEMOGLOBIN GLYCOSYLATED A1C: CPT | Performed by: INTERNAL MEDICINE

## 2023-10-26 PROCEDURE — 90682 RIV4 VACC RECOMBINANT DNA IM: CPT | Performed by: INTERNAL MEDICINE

## 2023-10-26 PROCEDURE — 87624 HPV HI-RISK TYP POOLED RSLT: CPT | Performed by: INTERNAL MEDICINE

## 2023-10-26 PROCEDURE — 81003 URINALYSIS AUTO W/O SCOPE: CPT | Performed by: INTERNAL MEDICINE

## 2023-10-26 PROCEDURE — 36415 COLL VENOUS BLD VENIPUNCTURE: CPT | Performed by: INTERNAL MEDICINE

## 2023-10-26 PROCEDURE — 99396 PREV VISIT EST AGE 40-64: CPT | Mod: 25 | Performed by: INTERNAL MEDICINE

## 2023-10-26 PROCEDURE — 82306 VITAMIN D 25 HYDROXY: CPT | Performed by: INTERNAL MEDICINE

## 2023-10-26 PROCEDURE — 80061 LIPID PANEL: CPT | Performed by: INTERNAL MEDICINE

## 2023-10-26 ASSESSMENT — ENCOUNTER SYMPTOMS
JOINT SWELLING: 0
ABDOMINAL PAIN: 0
CHILLS: 0
BREAST MASS: 0
PALPITATIONS: 0
NERVOUS/ANXIOUS: 0
PARESTHESIAS: 0
CONSTIPATION: 0
EYE PAIN: 0
COUGH: 0
SORE THROAT: 0
DYSURIA: 0
HEADACHES: 0
FEVER: 0
SHORTNESS OF BREATH: 0
WEAKNESS: 0
MYALGIAS: 0
NAUSEA: 0
DIZZINESS: 0
HEMATOCHEZIA: 0
FREQUENCY: 0
DIARRHEA: 0
HEMATURIA: 0
HEARTBURN: 0
ARTHRALGIAS: 0

## 2023-10-26 NOTE — PROGRESS NOTES
SUBJECTIVE:   CC: Isabela is an 58 year old who presents for preventive health visit.       10/26/2023    10:05 AM   Additional Questions   Roomed by lc-lpn   Accompanied by n/a       Healthy Habits:     Getting at least 3 servings of Calcium per day:  Yes    Bi-annual eye exam:  Yes    Dental care twice a year:  Yes    Sleep apnea or symptoms of sleep apnea:  Sleep apnea    Diet:  Regular (no restrictions)    Frequency of exercise:  4-5 days/week    Duration of exercise:  45-60 minutes    Taking medications regularly:  Yes    Medication side effects:  Not applicable    Additional concerns today:  Yes                    Social History     Tobacco Use    Smoking status: Never    Smokeless tobacco: Never   Substance Use Topics    Alcohol use: No             10/19/2023     4:56 PM   Alcohol Use   Prescreen: >3 drinks/day or >7 drinks/week? No     Reviewed orders with patient.  Reviewed health maintenance and updated orders accordingly -       Breast Cancer Screening:    FHS-7:       4/5/2022     3:57 PM 10/25/2022     9:00 AM 4/6/2023     3:17 PM 4/6/2023     3:19 PM 10/19/2023     4:59 PM   Breast CA Risk Assessment (FHS-7)   Did any of your first-degree relatives have breast or ovarian cancer? Yes Yes Yes Yes Yes   Did any of your relatives have bilateral breast cancer? No No No No No   Did any man in your family have breast cancer? No No No No No   Did any woman in your family have breast and ovarian cancer? No Yes Yes Yes Yes   Did any woman in your family have breast cancer before age 50 y? No No No No Unknown   Do you have 2 or more relatives with breast and/or ovarian cancer? No No No No No   Do you have 2 or more relatives with breast and/or bowel cancer? No No No No No         Pertinent mammograms are reviewed under the imaging tab.    History of abnormal Pap smear:       Latest Ref Rng & Units 10/1/2020     1:17 PM 3/21/2017     4:01 PM 7/31/2014    11:55 AM   PAP / HPV   PAP Negative for squamous  "intraepithelial lesion or malignancy. Negative for squamous intraepithelial lesion or malignancy  Electronically signed by Ese Porter CT (ASCP) on 10/5/2020 at  2:13 PM    Negative for squamous intraepithelial lesion or malignancy  Electronically signed by Leslye Rader CT (ASCP) on 3/24/2017 at  2:49 PM    Negative for squamous intraepithelial lesion or malignancy  Electronically signed by Ese Porter CT (ASCP) on 8/8/2014 at  2:23 PM        Reviewed and updated as needed this visit by clinical staff   Tobacco  Allergies  Meds              Reviewed and updated as needed this visit by Provider                     Review of Systems   Constitutional:  Negative for chills and fever.   HENT:  Negative for congestion, ear pain, hearing loss and sore throat.    Eyes:  Negative for pain and visual disturbance.   Respiratory:  Negative for cough and shortness of breath.    Cardiovascular:  Negative for chest pain, palpitations and peripheral edema.   Gastrointestinal:  Negative for abdominal pain, constipation, diarrhea, heartburn, hematochezia and nausea.   Breasts:  Negative for tenderness, breast mass and discharge.   Genitourinary:  Negative for dysuria, frequency, genital sores, hematuria, pelvic pain, urgency, vaginal bleeding and vaginal discharge.   Musculoskeletal:  Negative for arthralgias, joint swelling and myalgias.   Skin:  Negative for rash.   Neurological:  Negative for dizziness, weakness, headaches and paresthesias.   Psychiatric/Behavioral:  Negative for mood changes. The patient is not nervous/anxious.           OBJECTIVE:   /72 (BP Location: Right arm, Patient Position: Sitting, Cuff Size: Adult Large)   Pulse 85   Temp 98.2  F (36.8  C) (Oral)   Resp 18   Ht 1.651 m (5' 5\")   Wt 99.5 kg (219 lb 4.8 oz)   SpO2 96%   BMI 36.49 kg/m    Physical Exam  General Appearance: Alert, cooperative, no distress, appears stated age.  Head: Normocephalic, without obvious " abnormality, atraumatic  Eyes: PERRL, conjunctiva/corneas clear, EOM's intact  Ears: Normal TM's and external ear canals, both ears  Nose: Nares normal, septum midline,mucosa normal, no drainage  Throat: Lips, mucosa, and tongue normal; teeth and gums normal  Neck: Supple, symmetrical, trachea midline, no adenopathy;  thyroid: not enlarged, symmetric, no tenderness/mass/nodules; no carotid bruit or JVD  Back: Symmetric, no curvature, ROM normal, no CVA tenderness.  Lungs: Clear to auscultation bilaterally, respirations unlabored.  Breasts: No breast masses, tenderness, asymmetry, or nipple discharge.  Heart: Regular rate and rhythm, S1 and S2 normal, no murmur, rub, or gallop.  Abdomen: Soft, non-tender, bowel sounds active all four quadrants,  no masses, no organomegaly.  Pelvic:Normal external genitalia, speculum exam done, PAP done, cervix normal, bimanual exam showed no adnexal tenderness.  Extremities: Extremities normal, atraumatic, no cyanosis or edema.  Skin: Skin color, texture, turgor normal, no rashes or lesions.  Lymph nodes: Cervical, supraclavicular, and axillary nodes normal.  Neurologic: No focal neurological findings.        ASSESSMENT/PLAN:   (Z00.00) Annual physical exam  (primary encounter diagnosis)      (Z12.4) Cervical cancer screening  Comment:   Plan: Pap Screen with HPV - recommended age 30 - 65         years            (M05.79) Seropositive rheumatoid arthritis of multiple sites (H)  Comment: stable on sulfasalazine, methotrexate, Plaquenil  Plan: CBC with platelets, Comprehensive metabolic         panel            (G47.33) YUVAL (obstructive sleep apnea)      (E66.01) Morbid obesity (H)  Comment: weight loss and regular exercise discussed      (E03.9) Acquired hypothyroidism  Comment: on 125 mcg levothyroxine  Plan: TSH with free T4 reflex            (I10) Hypertension  Comment: stable on hydrochlorothiazide and lisinopril  Plan: CBC with platelets, Comprehensive metabolic         panel, UA  "Macroscopic with reflex to         Microscopic and Culture            (E78.5) Dyslipidemia  Comment:   Plan: Lipid panel reflex to direct LDL Fasting            (J45.20) Mild intermittent asthma without complication  Comment: allergy induced, very mild and rare symptoms of cough only      (Z78.0) Menopause  Comment:   Plan: DX Hip/Pelvis/Spine            (Z12.11) Special screening for malignant neoplasms, colon  Comment:   Plan: Colonoscopy Screening  Referral            (Z13.29,  Z13.228,  Z13.0) Screening for endocrine, metabolic and immunity disorder  Comment:   Plan: Vitamin D Deficiency            Patient has been advised of split billing requirements and indicates understanding: Yes      COUNSELING:  Reviewed preventive health counseling, as reflected in patient instructions       Regular exercise       Healthy diet/nutrition      BMI:   Estimated body mass index is 36.49 kg/m  as calculated from the following:    Height as of this encounter: 1.651 m (5' 5\").    Weight as of this encounter: 99.5 kg (219 lb 4.8 oz).         She reports that she has never smoked. She has never used smokeless tobacco.          Emily Bustillo MD  [facility]  "

## 2023-10-27 LAB — HBA1C MFR BLD: 5.1 % (ref 0–5.6)

## 2023-10-30 LAB
BKR LAB AP GYN ADEQUACY: NORMAL
BKR LAB AP GYN INTERPRETATION: NORMAL
BKR LAB AP HPV REFLEX: NORMAL
BKR LAB AP PREVIOUS ABNORMAL: NORMAL
PATH REPORT.COMMENTS IMP SPEC: NORMAL
PATH REPORT.COMMENTS IMP SPEC: NORMAL
PATH REPORT.RELEVANT HX SPEC: NORMAL

## 2023-10-31 LAB
HUMAN PAPILLOMA VIRUS 16 DNA: NEGATIVE
HUMAN PAPILLOMA VIRUS 18 DNA: NEGATIVE
HUMAN PAPILLOMA VIRUS FINAL DIAGNOSIS: NORMAL
HUMAN PAPILLOMA VIRUS OTHER HR: NEGATIVE

## 2023-11-01 PROBLEM — Z12.4 CERVICAL CANCER SCREENING: Status: ACTIVE | Noted: 2023-11-01

## 2023-11-13 ENCOUNTER — DOCUMENTATION ONLY (OUTPATIENT)
Dept: RHEUMATOLOGY | Facility: CLINIC | Age: 58
End: 2023-11-13
Payer: COMMERCIAL

## 2023-11-13 DIAGNOSIS — M05.79 SEROPOSITIVE RHEUMATOID ARTHRITIS OF MULTIPLE SITES (H): Primary | ICD-10-CM

## 2023-11-17 ENCOUNTER — LAB (OUTPATIENT)
Dept: LAB | Facility: CLINIC | Age: 58
End: 2023-11-17
Payer: COMMERCIAL

## 2023-11-17 DIAGNOSIS — Z79.899 HIGH RISK MEDICATION USE: ICD-10-CM

## 2023-11-17 DIAGNOSIS — M05.79 SEROPOSITIVE RHEUMATOID ARTHRITIS OF MULTIPLE SITES (H): ICD-10-CM

## 2023-11-17 LAB
ALBUMIN SERPL BCG-MCNC: 4.3 G/DL (ref 3.5–5.2)
ALT SERPL W P-5'-P-CCNC: 20 U/L (ref 0–50)
CREAT SERPL-MCNC: 1 MG/DL (ref 0.51–0.95)
EGFRCR SERPLBLD CKD-EPI 2021: 64 ML/MIN/1.73M2
ERYTHROCYTE [DISTWIDTH] IN BLOOD BY AUTOMATED COUNT: 13.2 % (ref 10–15)
HCT VFR BLD AUTO: 40.1 % (ref 35–47)
HGB BLD-MCNC: 13.2 G/DL (ref 11.7–15.7)
MCH RBC QN AUTO: 31 PG (ref 26.5–33)
MCHC RBC AUTO-ENTMCNC: 32.9 G/DL (ref 31.5–36.5)
MCV RBC AUTO: 94 FL (ref 78–100)
PLATELET # BLD AUTO: 286 10E3/UL (ref 150–450)
RBC # BLD AUTO: 4.26 10E6/UL (ref 3.8–5.2)
WBC # BLD AUTO: 5.3 10E3/UL (ref 4–11)

## 2023-11-17 PROCEDURE — 82565 ASSAY OF CREATININE: CPT

## 2023-11-17 PROCEDURE — 85027 COMPLETE CBC AUTOMATED: CPT

## 2023-11-17 PROCEDURE — 36415 COLL VENOUS BLD VENIPUNCTURE: CPT

## 2023-11-17 PROCEDURE — 84460 ALANINE AMINO (ALT) (SGPT): CPT

## 2023-11-17 PROCEDURE — 82040 ASSAY OF SERUM ALBUMIN: CPT

## 2023-11-20 RX ORDER — SULFASALAZINE 500 MG/1
TABLET ORAL
Qty: 360 TABLET | Refills: 0 | Status: SHIPPED | OUTPATIENT
Start: 2023-11-20 | End: 2024-02-22

## 2023-11-20 RX ORDER — FOLIC ACID 1 MG/1
TABLET ORAL
Qty: 90 TABLET | Refills: 0 | Status: SHIPPED | OUTPATIENT
Start: 2023-11-20 | End: 2024-03-06

## 2023-11-20 NOTE — TELEPHONE ENCOUNTER
Per Stacy 11/19/23:  Dr. Lyn recommends making sure you are drinking plenty of fluids as being dehydrated can sometimes cause this elevation. He will continue to monitor the creatinine but no changes are needed with medications for now.     Refilled per Rheum RN refill protocol.

## 2023-11-29 ENCOUNTER — TRANSFERRED RECORDS (OUTPATIENT)
Dept: HEALTH INFORMATION MANAGEMENT | Facility: CLINIC | Age: 58
End: 2023-11-29
Payer: COMMERCIAL

## 2023-12-08 DIAGNOSIS — E03.9 HYPOTHYROIDISM: ICD-10-CM

## 2023-12-08 RX ORDER — LEVOTHYROXINE SODIUM 125 UG/1
TABLET ORAL
Qty: 90 TABLET | Refills: 2 | Status: SHIPPED | OUTPATIENT
Start: 2023-12-08

## 2024-01-04 ENCOUNTER — ANCILLARY PROCEDURE (OUTPATIENT)
Dept: BONE DENSITY | Facility: CLINIC | Age: 59
End: 2024-01-04
Attending: INTERNAL MEDICINE
Payer: COMMERCIAL

## 2024-01-04 DIAGNOSIS — Z78.0 MENOPAUSE: ICD-10-CM

## 2024-01-04 PROCEDURE — 77080 DXA BONE DENSITY AXIAL: CPT | Mod: TC | Performed by: PHYSICIAN ASSISTANT

## 2024-01-09 DIAGNOSIS — I10 ESSENTIAL HYPERTENSION: ICD-10-CM

## 2024-01-09 RX ORDER — LISINOPRIL 20 MG/1
20 TABLET ORAL DAILY
Qty: 90 TABLET | Refills: 3 | Status: SHIPPED | OUTPATIENT
Start: 2024-01-09

## 2024-01-14 DIAGNOSIS — M05.79 SEROPOSITIVE RHEUMATOID ARTHRITIS OF MULTIPLE SITES (H): ICD-10-CM

## 2024-01-16 ENCOUNTER — MYC MEDICAL ADVICE (OUTPATIENT)
Dept: INTERNAL MEDICINE | Facility: CLINIC | Age: 59
End: 2024-01-16
Payer: COMMERCIAL

## 2024-01-16 RX ORDER — METHOTREXATE 2.5 MG/1
TABLET ORAL
Qty: 96 TABLET | Refills: 0 | Status: SHIPPED | OUTPATIENT
Start: 2024-01-16 | End: 2024-06-12

## 2024-01-24 ENCOUNTER — TRANSFERRED RECORDS (OUTPATIENT)
Dept: HEALTH INFORMATION MANAGEMENT | Facility: CLINIC | Age: 59
End: 2024-01-24
Payer: COMMERCIAL

## 2024-02-20 ENCOUNTER — LAB (OUTPATIENT)
Dept: LAB | Facility: CLINIC | Age: 59
End: 2024-02-20
Payer: COMMERCIAL

## 2024-02-20 DIAGNOSIS — M05.79 SEROPOSITIVE RHEUMATOID ARTHRITIS OF MULTIPLE SITES (H): ICD-10-CM

## 2024-02-20 LAB
ALBUMIN SERPL BCG-MCNC: 4.5 G/DL (ref 3.5–5.2)
ALT SERPL W P-5'-P-CCNC: 15 U/L (ref 0–50)
CREAT SERPL-MCNC: 1.03 MG/DL (ref 0.51–0.95)
EGFRCR SERPLBLD CKD-EPI 2021: 62 ML/MIN/1.73M2
ERYTHROCYTE [DISTWIDTH] IN BLOOD BY AUTOMATED COUNT: 12.9 % (ref 10–15)
HCT VFR BLD AUTO: 42.3 % (ref 35–47)
HGB BLD-MCNC: 13.7 G/DL (ref 11.7–15.7)
MCH RBC QN AUTO: 31 PG (ref 26.5–33)
MCHC RBC AUTO-ENTMCNC: 32.4 G/DL (ref 31.5–36.5)
MCV RBC AUTO: 96 FL (ref 78–100)
PLATELET # BLD AUTO: 286 10E3/UL (ref 150–450)
RBC # BLD AUTO: 4.42 10E6/UL (ref 3.8–5.2)
WBC # BLD AUTO: 6.1 10E3/UL (ref 4–11)

## 2024-02-20 PROCEDURE — 82040 ASSAY OF SERUM ALBUMIN: CPT

## 2024-02-20 PROCEDURE — 84460 ALANINE AMINO (ALT) (SGPT): CPT

## 2024-02-20 PROCEDURE — 82565 ASSAY OF CREATININE: CPT

## 2024-02-20 PROCEDURE — 36415 COLL VENOUS BLD VENIPUNCTURE: CPT

## 2024-02-20 PROCEDURE — 85027 COMPLETE CBC AUTOMATED: CPT

## 2024-02-22 ENCOUNTER — OFFICE VISIT (OUTPATIENT)
Dept: RHEUMATOLOGY | Facility: CLINIC | Age: 59
End: 2024-02-22
Payer: COMMERCIAL

## 2024-02-22 VITALS
SYSTOLIC BLOOD PRESSURE: 122 MMHG | BODY MASS INDEX: 37.24 KG/M2 | OXYGEN SATURATION: 99 % | HEART RATE: 88 BPM | WEIGHT: 223.8 LBS | DIASTOLIC BLOOD PRESSURE: 78 MMHG

## 2024-02-22 DIAGNOSIS — M05.79 SEROPOSITIVE RHEUMATOID ARTHRITIS OF MULTIPLE SITES (H): Primary | ICD-10-CM

## 2024-02-22 DIAGNOSIS — M15.0 PRIMARY OSTEOARTHRITIS INVOLVING MULTIPLE JOINTS: ICD-10-CM

## 2024-02-22 DIAGNOSIS — Z79.899 HIGH RISK MEDICATION USE: ICD-10-CM

## 2024-02-22 DIAGNOSIS — R76.8 POSITIVE ANTI-CCP TEST: ICD-10-CM

## 2024-02-22 PROCEDURE — G2211 COMPLEX E/M VISIT ADD ON: HCPCS | Performed by: INTERNAL MEDICINE

## 2024-02-22 PROCEDURE — 99214 OFFICE O/P EST MOD 30 MIN: CPT | Performed by: INTERNAL MEDICINE

## 2024-02-22 RX ORDER — HYDROXYCHLOROQUINE SULFATE 200 MG/1
200 TABLET, FILM COATED ORAL 2 TIMES DAILY WITH MEALS
Qty: 180 TABLET | Refills: 1 | Status: SHIPPED | OUTPATIENT
Start: 2024-02-22 | End: 2024-08-22

## 2024-02-22 RX ORDER — SULFASALAZINE 500 MG/1
TABLET ORAL
Qty: 360 TABLET | Refills: 0 | Status: SHIPPED | OUTPATIENT
Start: 2024-02-22 | End: 2024-07-05

## 2024-02-22 NOTE — PROGRESS NOTES
Rheumatology follow-up visit note     Isabela is a 58 year old female presents today for follow-up.    Isabela was seen today for recheck.    Diagnoses and all orders for this visit:    Seropositive rheumatoid arthritis of multiple sites (H)  -     sulfaSALAzine (AZULFIDINE) 500 MG tablet; TAKE TWO TABLETS BY MOUTH TWICE DAILY  -     hydroxychloroquine (PLAQUENIL) 200 MG tablet; Take 1 tablet (200 mg) by mouth 2 times daily (with meals)    High risk medication use    Positive anti-CCP test    Primary osteoarthritis involving multiple joints       her rheumatoid arthritis remains under good control with the current combination, occasional discomfort in the OA and the management principles of which she is aware of avoiding nonsteroidals in view of intermittent renal impairment.      The longitudinal plan of care for the diagnosis(es)/condition(s) as documented were addressed during this visit. Due to the added complexity in care, I will continue to support Isabela in the subsequent management and with ongoing continuity of care.      Follow up in 6 months.  Her eye exam is coming up in April.  Labs every 3 months.    HPI    Isabela A Voit is a 58 year old female is here for follow-up of  rheumatoid arthritis.  This is polyarticular.  Positive anti-CCP antibody.  She has osteoarthritis.  She is doing great with the current triple regimen of hydroxychloroquine sulfasalazine and methotrexate with folic acid recent labs are normal.  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  Besides volunteering she has joined gym, water aerobics and is very happy for that.  She has noted mild discomfort at the bases of her thumbs for which she does not even take Tylenol.   Although she does not care for the CPAP but it is helping her.  She has been under quite a bit of stress.  Her mom fell she had pelvic fracture her dementia has gotten worse she does not want to go into an assisted living she is not a  candidate to be by herself anymore.  So there is quite a bit of struggle going on there.         DETAILED EXAMINATION  02/22/24  :    Vitals:    02/22/24 1100   BP: 122/78   Pulse: 88   SpO2: 99%   Weight: 101.5 kg (223 lb 12.8 oz)     Alert oriented. Head including the face is examined for malar rash, heliotropes, scarring, lupus pernio. Eyes examined for redness such as in episcleritis/scleritis, periorbital lesions.   Neck/ Face examined for parotid gland swelling, range of motion of neck.  Left upper and lower and right upper and lower extremities examined for tenderness, swelling, warmth of the appendicular joints, range of motion, edema, rash.  Some of the important findings included: she does not have evidence of synovitis in the palpable joints of the upper extremities.  No significant deformities of the digits.  no Heberden nodes.  Range of motion of the shoulders   show full abduction.  No JLT effusion or warmth of the knees.  she does not have dactylitis of the digits.     Patient Active Problem List    Diagnosis Date Noted    Cervical cancer screening 11/01/2023     Priority: Medium     Pt takes Methotrexate potential for immunosuppression  2014, 2017, 2020 NIL Pap's  10/26/23 NIL Pap. Neg HR HPV Plan cotest in 3 years       YUVAL (obstructive sleep apnea) 02/04/2022     Priority: Medium     Severe YUVAL      Morbid obesity (H) 10/05/2021     Priority: Medium    Asthma      Priority: Medium     Created by Conversion        Primary osteoarthritis involving multiple joints 11/01/2018     Priority: Medium    Dyslipidemia      Priority: Medium     Created by Conversion        Positive anti-CCP test 10/20/2016     Priority: Medium    Hypothyroidism      Priority: Medium     Created by Conversion  Replacement Utility updated for latest IMO load        Hypertension      Priority: Medium     Created by Conversion  Replacement Utility updated for latest IMO load        Vitamin D Deficiency      Priority: Medium      Created by Conversion  Replacement Utility updated for latest IMO load        Seropositive rheumatoid arthritis of multiple sites (H) 04/07/2016     Priority: Medium    High risk medication use 04/07/2016     Priority: Medium     Past Surgical History:   Procedure Laterality Date    BIOPSY  2007    Thyroidectomy    COLONOSCOPY  2014    EYE SURGERY  2020    Eyelids    GYN SURGERY  2005    Fibroids    IA THYROID LOBECTOMY,UNILAT      Description: Thyroid Surgery Thyroid Lobectomy Right Lobe;  Proc Date: 02/05/2007;      Past Medical History:   Diagnosis Date    Arthritis     Hypertension     Thyroid disease     Uncomplicated asthma      Allergies   Allergen Reactions    Dilantin Infatabs [Phenytoin] Unknown    Phenytoin Sodium Extended [Phenytoin] Hives     Current Outpatient Medications   Medication Sig Dispense Refill    Bioflavonoid Products (VITAMIN C) CHEW       cholecalciferol, vitamin D3, (CHOLECALCIFEROL) 1,000 unit tablet [CHOLECALCIFEROL, VITAMIN D3, (CHOLECALCIFEROL) 1,000 UNIT TABLET] Take 1,000 Units by mouth daily.      folic acid (FOLVITE) 1 MG tablet TAKE ONE TABLET BY MOUTH ONE TIME DAILY 90 tablet 0    hydrochlorothiazide (HYDRODIURIL) 25 MG tablet Take 1 tablet (25 mg) by mouth daily 90 tablet 3    hydroxychloroquine (PLAQUENIL) 200 MG tablet Take 1 tablet (200 mg) by mouth 2 times daily (with meals) 180 tablet 1    levothyroxine (SYNTHROID/LEVOTHROID) 125 MCG tablet TAKE ONE TABLET BY MOUTH ONE TIME DAILY 90 tablet 2    lisinopril (ZESTRIL) 20 MG tablet TAKE ONE TABLET BY MOUTH ONE TIME DAILY 90 tablet 3    methotrexate 2.5 MG tablet TAKE EIGHT TABLETS BY MOUTH ONCE A WEEK 96 tablet 0    sulfaSALAzine (AZULFIDINE) 500 MG tablet TAKE TWO TABLETS BY MOUTH TWICE DAILY 360 tablet 0     family history includes Asthma in her mother; Breast Cancer (age of onset: 62.00) in her mother; Diabetes in her father; Heart Disease in her maternal grandfather; Hyperlipidemia in her father and mother; Hypertension in  her father and mother; Osteoporosis in her mother; Sleep Apnea in her father.  Social Connections: Not on file          WBC Count   Date Value Ref Range Status   02/20/2024 6.1 4.0 - 11.0 10e3/uL Final     RBC Count   Date Value Ref Range Status   02/20/2024 4.42 3.80 - 5.20 10e6/uL Final     Hemoglobin   Date Value Ref Range Status   02/20/2024 13.7 11.7 - 15.7 g/dL Final     Hematocrit   Date Value Ref Range Status   02/20/2024 42.3 35.0 - 47.0 % Final     MCV   Date Value Ref Range Status   02/20/2024 96 78 - 100 fL Final     MCH   Date Value Ref Range Status   02/20/2024 31.0 26.5 - 33.0 pg Final     Platelet Count   Date Value Ref Range Status   02/20/2024 286 150 - 450 10e3/uL Final     AST   Date Value Ref Range Status   10/26/2023 44 0 - 45 U/L Final     Comment:     Specimen is hemolyzed which can falsely elevate AST. Analysis of a non-hemolyzed specimen may result in a lower value.    Specimen is hemolyzed which can falsely elevate AST. Analysis of a non-hemolyzed specimen may result in a lower value.  Reference intervals for this test were updated on 6/12/2023 to more accurately reflect our healthy population. There may be differences in the flagging of prior results with similar values performed with this method. Interpretation of those prior results can be made in the context of the updated reference intervals.     ALT   Date Value Ref Range Status   02/20/2024 15 0 - 50 U/L Final     Albumin   Date Value Ref Range Status   02/20/2024 4.5 3.5 - 5.2 g/dL Final   05/20/2022 4.0 3.5 - 5.0 g/dL Final     Alkaline Phosphatase   Date Value Ref Range Status   10/26/2023 64 35 - 104 U/L Final     Creatinine   Date Value Ref Range Status   02/20/2024 1.03 (H) 0.51 - 0.95 mg/dL Final     GFR Estimate   Date Value Ref Range Status   02/20/2024 62 >60 mL/min/1.73m2 Final   05/04/2021 58 (L) >60 mL/min/1.73m2 Final     GFR Estimate If Black   Date Value Ref Range Status   05/04/2021 >60 >60 mL/min/1.73m2 Final

## 2024-03-06 DIAGNOSIS — M05.79 SEROPOSITIVE RHEUMATOID ARTHRITIS OF MULTIPLE SITES (H): ICD-10-CM

## 2024-03-06 DIAGNOSIS — Z79.899 HIGH RISK MEDICATION USE: ICD-10-CM

## 2024-03-06 RX ORDER — FOLIC ACID 1 MG/1
TABLET ORAL
Qty: 90 TABLET | Refills: 0 | Status: SHIPPED | OUTPATIENT
Start: 2024-03-06 | End: 2024-06-05

## 2024-04-12 ENCOUNTER — ANCILLARY PROCEDURE (OUTPATIENT)
Dept: MAMMOGRAPHY | Facility: CLINIC | Age: 59
End: 2024-04-12
Attending: INTERNAL MEDICINE
Payer: COMMERCIAL

## 2024-04-12 DIAGNOSIS — Z12.31 VISIT FOR SCREENING MAMMOGRAM: ICD-10-CM

## 2024-04-12 PROCEDURE — 77063 BREAST TOMOSYNTHESIS BI: CPT

## 2024-05-21 ENCOUNTER — LAB (OUTPATIENT)
Dept: LAB | Facility: CLINIC | Age: 59
End: 2024-05-21
Payer: COMMERCIAL

## 2024-05-21 DIAGNOSIS — M05.79 SEROPOSITIVE RHEUMATOID ARTHRITIS OF MULTIPLE SITES (H): ICD-10-CM

## 2024-05-21 LAB
ALBUMIN SERPL BCG-MCNC: 4.5 G/DL (ref 3.5–5.2)
ALT SERPL W P-5'-P-CCNC: 13 U/L (ref 0–50)
CREAT SERPL-MCNC: 0.95 MG/DL (ref 0.51–0.95)
EGFRCR SERPLBLD CKD-EPI 2021: 68 ML/MIN/1.73M2
ERYTHROCYTE [DISTWIDTH] IN BLOOD BY AUTOMATED COUNT: 13.3 % (ref 10–15)
HCT VFR BLD AUTO: 42.3 % (ref 35–47)
HGB BLD-MCNC: 13.7 G/DL (ref 11.7–15.7)
MCH RBC QN AUTO: 30.6 PG (ref 26.5–33)
MCHC RBC AUTO-ENTMCNC: 32.4 G/DL (ref 31.5–36.5)
MCV RBC AUTO: 95 FL (ref 78–100)
PLATELET # BLD AUTO: 297 10E3/UL (ref 150–450)
RBC # BLD AUTO: 4.47 10E6/UL (ref 3.8–5.2)
WBC # BLD AUTO: 5.1 10E3/UL (ref 4–11)

## 2024-05-21 PROCEDURE — 36415 COLL VENOUS BLD VENIPUNCTURE: CPT

## 2024-05-21 PROCEDURE — 82565 ASSAY OF CREATININE: CPT

## 2024-05-21 PROCEDURE — 82040 ASSAY OF SERUM ALBUMIN: CPT

## 2024-05-21 PROCEDURE — 84460 ALANINE AMINO (ALT) (SGPT): CPT

## 2024-05-21 PROCEDURE — 85027 COMPLETE CBC AUTOMATED: CPT

## 2024-06-04 DIAGNOSIS — M05.79 SEROPOSITIVE RHEUMATOID ARTHRITIS OF MULTIPLE SITES (H): ICD-10-CM

## 2024-06-04 DIAGNOSIS — Z79.899 HIGH RISK MEDICATION USE: ICD-10-CM

## 2024-06-05 RX ORDER — FOLIC ACID 1 MG/1
TABLET ORAL
Qty: 90 TABLET | Refills: 0 | Status: SHIPPED | OUTPATIENT
Start: 2024-06-05 | End: 2024-08-22

## 2024-06-10 ENCOUNTER — OFFICE VISIT (OUTPATIENT)
Dept: FAMILY MEDICINE | Facility: CLINIC | Age: 59
End: 2024-06-10
Payer: COMMERCIAL

## 2024-06-10 ENCOUNTER — ANCILLARY PROCEDURE (OUTPATIENT)
Dept: GENERAL RADIOLOGY | Facility: CLINIC | Age: 59
End: 2024-06-10
Attending: FAMILY MEDICINE
Payer: COMMERCIAL

## 2024-06-10 VITALS
BODY MASS INDEX: 37.32 KG/M2 | HEART RATE: 72 BPM | OXYGEN SATURATION: 96 % | WEIGHT: 224 LBS | TEMPERATURE: 98 F | HEIGHT: 65 IN | RESPIRATION RATE: 18 BRPM | DIASTOLIC BLOOD PRESSURE: 83 MMHG | SYSTOLIC BLOOD PRESSURE: 145 MMHG

## 2024-06-10 DIAGNOSIS — M05.79 SEROPOSITIVE RHEUMATOID ARTHRITIS OF MULTIPLE SITES (H): ICD-10-CM

## 2024-06-10 DIAGNOSIS — M79.676 PAIN OF FIFTH TOE: ICD-10-CM

## 2024-06-10 DIAGNOSIS — M79.676 PAIN OF FIFTH TOE: Primary | ICD-10-CM

## 2024-06-10 PROCEDURE — 73660 X-RAY EXAM OF TOE(S): CPT | Mod: TC | Performed by: RADIOLOGY

## 2024-06-10 PROCEDURE — 99213 OFFICE O/P EST LOW 20 MIN: CPT | Performed by: FAMILY MEDICINE

## 2024-06-10 ASSESSMENT — ASTHMA QUESTIONNAIRES
QUESTION_3 LAST FOUR WEEKS HOW OFTEN DID YOUR ASTHMA SYMPTOMS (WHEEZING, COUGHING, SHORTNESS OF BREATH, CHEST TIGHTNESS OR PAIN) WAKE YOU UP AT NIGHT OR EARLIER THAN USUAL IN THE MORNING: ONCE OR TWICE
QUESTION_2 LAST FOUR WEEKS HOW OFTEN HAVE YOU HAD SHORTNESS OF BREATH: NOT AT ALL
QUESTION_5 LAST FOUR WEEKS HOW WOULD YOU RATE YOUR ASTHMA CONTROL: COMPLETELY CONTROLLED
QUESTION_1 LAST FOUR WEEKS HOW MUCH OF THE TIME DID YOUR ASTHMA KEEP YOU FROM GETTING AS MUCH DONE AT WORK, SCHOOL OR AT HOME: NONE OF THE TIME
ACT_TOTALSCORE: 24
ACT_TOTALSCORE: 24
QUESTION_4 LAST FOUR WEEKS HOW OFTEN HAVE YOU USED YOUR RESCUE INHALER OR NEBULIZER MEDICATION (SUCH AS ALBUTEROL): NOT AT ALL

## 2024-06-10 ASSESSMENT — PAIN SCALES - GENERAL: PAINLEVEL: MILD PAIN (2)

## 2024-06-10 NOTE — PROGRESS NOTES
"  Assessment & Plan     Pain of fifth toe  Acute fracture on x-ray.  Advised zoila taping and slowly increasing activity as tolerated.  - XR Toe Right G/E 2 Views; Future          BMI  Estimated body mass index is 37.28 kg/m  as calculated from the following:    Height as of this encounter: 1.651 m (5' 5\").    Weight as of this encounter: 101.6 kg (224 lb).   Weight management plan: Discussed healthy diet and exercise guidelines          [place] is a 58 year old, presenting for the following health issues:  Foot Injury (Dropped reading tablet in right foot 06/05/2024, swelling, bruising, unable to bend pinky toe)        6/10/2024     9:26 AM   Additional Questions   Roomed by [facility], Visit Facilitator     Foot Injury       5 days ago a yissel dropped from counter and the corner of the yissel landed on barefoot right 5th toe. There is associated bruising, not able to bend 5th toe and pain with walking.  She had a DEXA 6 months ago that showed mild osteopenia only in left hip, otherwise normal.            Review of Systems  No fever      Objective    BP (!) 145/83 (BP Location: Left arm, Patient Position: Sitting, Cuff Size: Adult Large)   Pulse 72   Temp 98  F (36.7  C) (Oral)   Resp 18   Ht 1.651 m (5' 5\")   Wt 101.6 kg (224 lb)   SpO2 96%   BMI 37.28 kg/m    Body mass index is 37.28 kg/m .  Physical Exam   GENERAL: alert and no distress  MS: Tenderness to palpation of right fifth toe, no metatarsal bone tenderness.  PSYCH: mentation appears normal, affect normal/bright            Signed Electronically by: Karthikeyan Antoine MD    "

## 2024-06-12 RX ORDER — METHOTREXATE 2.5 MG/1
TABLET ORAL
Qty: 96 TABLET | Refills: 0 | Status: SHIPPED | OUTPATIENT
Start: 2024-06-12 | End: 2024-08-22

## 2024-06-26 ENCOUNTER — TRANSFERRED RECORDS (OUTPATIENT)
Dept: HEALTH INFORMATION MANAGEMENT | Facility: CLINIC | Age: 59
End: 2024-06-26
Payer: COMMERCIAL

## 2024-07-03 DIAGNOSIS — M05.79 SEROPOSITIVE RHEUMATOID ARTHRITIS OF MULTIPLE SITES (H): ICD-10-CM

## 2024-07-05 RX ORDER — SULFASALAZINE 500 MG/1
TABLET ORAL
Qty: 240 TABLET | Refills: 0 | Status: SHIPPED | OUTPATIENT
Start: 2024-07-05 | End: 2024-10-02

## 2024-07-05 ASSESSMENT — SLEEP AND FATIGUE QUESTIONNAIRES
HOW LIKELY ARE YOU TO NOD OFF OR FALL ASLEEP WHILE WATCHING TV: SLIGHT CHANCE OF DOZING
HOW LIKELY ARE YOU TO NOD OFF OR FALL ASLEEP WHILE SITTING INACTIVE IN A PUBLIC PLACE: WOULD NEVER DOZE
HOW LIKELY ARE YOU TO NOD OFF OR FALL ASLEEP WHILE LYING DOWN TO REST IN THE AFTERNOON WHEN CIRCUMSTANCES PERMIT: HIGH CHANCE OF DOZING
HOW LIKELY ARE YOU TO NOD OFF OR FALL ASLEEP WHILE SITTING AND TALKING TO SOMEONE: WOULD NEVER DOZE
HOW LIKELY ARE YOU TO NOD OFF OR FALL ASLEEP WHILE SITTING AND READING: SLIGHT CHANCE OF DOZING
HOW LIKELY ARE YOU TO NOD OFF OR FALL ASLEEP IN A CAR, WHILE STOPPED FOR A FEW MINUTES IN TRAFFIC: WOULD NEVER DOZE
HOW LIKELY ARE YOU TO NOD OFF OR FALL ASLEEP WHILE SITTING QUIETLY AFTER LUNCH WITHOUT ALCOHOL: SLIGHT CHANCE OF DOZING
HOW LIKELY ARE YOU TO NOD OFF OR FALL ASLEEP WHEN YOU ARE A PASSENGER IN A CAR FOR AN HOUR WITHOUT A BREAK: HIGH CHANCE OF DOZING

## 2024-07-10 ENCOUNTER — VIRTUAL VISIT (OUTPATIENT)
Dept: SLEEP MEDICINE | Facility: CLINIC | Age: 59
End: 2024-07-10
Payer: COMMERCIAL

## 2024-07-10 VITALS — HEIGHT: 66 IN | WEIGHT: 220 LBS | BODY MASS INDEX: 35.36 KG/M2

## 2024-07-10 DIAGNOSIS — G47.33 OSA (OBSTRUCTIVE SLEEP APNEA): Primary | ICD-10-CM

## 2024-07-10 DIAGNOSIS — J34.89 NASAL OBSTRUCTION: ICD-10-CM

## 2024-07-10 PROCEDURE — 99214 OFFICE O/P EST MOD 30 MIN: CPT | Mod: 95 | Performed by: PSYCHIATRY & NEUROLOGY

## 2024-07-10 ASSESSMENT — PAIN SCALES - GENERAL: PAINLEVEL: MILD PAIN (2)

## 2024-07-10 NOTE — PROGRESS NOTES
Patient returns to discuss CPAP usage.     Patient was diagnosed with sleep disordered breathing in particular with an obstructive component in 2022 with an AHI of 37.  The patient has been treated with autotitrating CPAP (pressures 5 to 10).  The patient indicates treatment is going well.  Uses the device nearly every night for the vast majority of nights.  But struggles in large part due to nasal congestion/obstruction.     Per the download patient is using the device > 4 hours 71% of nights.  AHI on download is now < 5.  Leak is reasonable but would likely be better with new supplies.     Discussed the improvement in daytime alertness with PAP therapy as well as the cardiovascular benefits.  Patient is highly motivated to continue to use therapy but is interested in visiting with ENT to evaluate for any strategies that may address nasal obstruction.     Patient has a history of currently treated HTN.     Orders placed for new supplies and patient will follow up in 6 months with Lisa Umanzor who she has followed with for some time.      All questions have been answered.  Its a pleasure being asked to participate in the patients care.  Patient has been advised on the importance of never driving if tired or sleepy.     In addition to face to face time, time was spent in care coordination today (chart review, orders, documentation).  Total time spent in the care of the patient today was 30 minutes.

## 2024-07-10 NOTE — NURSING NOTE
Has patient had flu shot for current/most recent flu season? If so, when? Yes: 10/26/23        Current patient location: [address] [place] [id number]    Is the patient currently in the state of [place]? YES    Visit mode:VIDEO    If the visit is dropped, the patient can be reconnected by: VIDEO VISIT: Text to cell phone:   Telephone Information:   Mobile [phone]       Will anyone else be joining the visit? NO  (If patient encounters technical issues they should call [phone])    How would you like to obtain your AVS? MyChart    Are changes needed to the allergy or medication list? No    Are refills needed on medications prescribed by this physician? NO    Reason for visit: RECHECK    Gina ARROYO

## 2024-08-20 ENCOUNTER — LAB (OUTPATIENT)
Dept: LAB | Facility: CLINIC | Age: 59
End: 2024-08-20
Payer: COMMERCIAL

## 2024-08-20 DIAGNOSIS — M05.79 SEROPOSITIVE RHEUMATOID ARTHRITIS OF MULTIPLE SITES (H): ICD-10-CM

## 2024-08-20 LAB
ALBUMIN SERPL BCG-MCNC: 4.4 G/DL (ref 3.5–5.2)
ALT SERPL W P-5'-P-CCNC: 19 U/L (ref 0–50)
CREAT SERPL-MCNC: 1.03 MG/DL (ref 0.51–0.95)
EGFRCR SERPLBLD CKD-EPI 2021: 62 ML/MIN/1.73M2
ERYTHROCYTE [DISTWIDTH] IN BLOOD BY AUTOMATED COUNT: 13.4 % (ref 10–15)
HCT VFR BLD AUTO: 41.7 % (ref 35–47)
HGB BLD-MCNC: 13.5 G/DL (ref 11.7–15.7)
MCH RBC QN AUTO: 30.5 PG (ref 26.5–33)
MCHC RBC AUTO-ENTMCNC: 32.4 G/DL (ref 31.5–36.5)
MCV RBC AUTO: 94 FL (ref 78–100)
PLATELET # BLD AUTO: 281 10E3/UL (ref 150–450)
RBC # BLD AUTO: 4.43 10E6/UL (ref 3.8–5.2)
WBC # BLD AUTO: 5.9 10E3/UL (ref 4–11)

## 2024-08-20 PROCEDURE — 84460 ALANINE AMINO (ALT) (SGPT): CPT

## 2024-08-20 PROCEDURE — 82040 ASSAY OF SERUM ALBUMIN: CPT

## 2024-08-20 PROCEDURE — 85027 COMPLETE CBC AUTOMATED: CPT

## 2024-08-20 PROCEDURE — 36415 COLL VENOUS BLD VENIPUNCTURE: CPT

## 2024-08-20 PROCEDURE — 82565 ASSAY OF CREATININE: CPT

## 2024-08-22 ENCOUNTER — OFFICE VISIT (OUTPATIENT)
Dept: RHEUMATOLOGY | Facility: CLINIC | Age: 59
End: 2024-08-22
Payer: COMMERCIAL

## 2024-08-22 VITALS
HEART RATE: 81 BPM | BODY MASS INDEX: 36.15 KG/M2 | SYSTOLIC BLOOD PRESSURE: 138 MMHG | WEIGHT: 224 LBS | DIASTOLIC BLOOD PRESSURE: 78 MMHG | OXYGEN SATURATION: 96 %

## 2024-08-22 DIAGNOSIS — R76.8 POSITIVE ANTI-CCP TEST: ICD-10-CM

## 2024-08-22 DIAGNOSIS — M15.0 PRIMARY OSTEOARTHRITIS INVOLVING MULTIPLE JOINTS: ICD-10-CM

## 2024-08-22 DIAGNOSIS — M05.79 SEROPOSITIVE RHEUMATOID ARTHRITIS OF MULTIPLE SITES (H): Primary | ICD-10-CM

## 2024-08-22 DIAGNOSIS — N18.31 STAGE 3A CHRONIC KIDNEY DISEASE (H): ICD-10-CM

## 2024-08-22 DIAGNOSIS — Z79.899 HIGH RISK MEDICATION USE: ICD-10-CM

## 2024-08-22 PROCEDURE — G2211 COMPLEX E/M VISIT ADD ON: HCPCS | Performed by: INTERNAL MEDICINE

## 2024-08-22 PROCEDURE — 99214 OFFICE O/P EST MOD 30 MIN: CPT | Performed by: INTERNAL MEDICINE

## 2024-08-22 RX ORDER — DULOXETIN HYDROCHLORIDE 20 MG/1
20 CAPSULE, DELAYED RELEASE ORAL DAILY
Qty: 30 CAPSULE | Refills: 2 | Status: SHIPPED | OUTPATIENT
Start: 2024-08-22 | End: 2024-09-21

## 2024-08-22 RX ORDER — HYDROXYCHLOROQUINE SULFATE 200 MG/1
200 TABLET, FILM COATED ORAL 2 TIMES DAILY WITH MEALS
Qty: 180 TABLET | Refills: 1 | Status: SHIPPED | OUTPATIENT
Start: 2024-08-22

## 2024-08-22 RX ORDER — METHOTREXATE 2.5 MG/1
TABLET ORAL
Qty: 96 TABLET | Refills: 0 | Status: SHIPPED | OUTPATIENT
Start: 2024-08-22

## 2024-08-22 RX ORDER — FOLIC ACID 1 MG/1
1000 TABLET ORAL DAILY
Qty: 90 TABLET | Refills: 0 | Status: SHIPPED | OUTPATIENT
Start: 2024-08-22

## 2024-08-22 NOTE — PROGRESS NOTES
Rheumatology follow-up visit note     Isabela is a 59 year old female presents today for follow-up.    Isabela was seen today for recheck.    Diagnoses and all orders for this visit:    Seropositive rheumatoid arthritis of multiple sites (H)  -     hydroxychloroquine (PLAQUENIL) 200 MG tablet; Take 1 tablet (200 mg) by mouth 2 times daily (with meals).  -     methotrexate 2.5 MG tablet; TAKE 8 TABLETS BY MOUTH ONCE PER WEEK.  -     folic acid (FOLVITE) 1 MG tablet; Take 1 tablet (1,000 mcg) by mouth daily.    High risk medication use  -     folic acid (FOLVITE) 1 MG tablet; Take 1 tablet (1,000 mcg) by mouth daily.    Positive anti-CCP test    Primary osteoarthritis involving multiple joints  -     DULoxetine (CYMBALTA) 20 MG capsule; Take 1 capsule (20 mg) by mouth daily.    Stage 3a chronic kidney disease (H)        Well-controlled rheumatoid arthritis continue current regimen recent labs reviewed renal impairment as noted previously.  Aware of avoidance of nonsteroidals.  The residual pain is more likely osteoarthritis related.  Discussed options.  She is prepared to try duloxetine major side effects outlined.    Follow up in 3 months.    HPI    Isabela STRATTON Vorajat is a 59 year old female is here for follow-up of    rheumatoid arthritis.  This is polyarticular.  Positive anti-CCP antibody.  She has osteoarthritis.  She is doing great with regards to control of rheumatoid arthritis with the current triple regimen of hydroxychloroquine sulfasalazine and methotrexate with folic acid recent labs are normal.  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  Besides volunteering she has joined gym, water aerobics and is very happy for that.  She has noted mild discomfort at the bases of her thumbs for which she does not even take Tylenol.   She has noted worsening pain such as in her knees which sounds more like osteoarthritis, this does not wake her up from sleep.  She has not observed  swelling in the knees.  Although she does not care for the CPAP but it is helping her.  She has been under quite a bit of stress.  Her mom fell she had pelvic fracture her dementia has gotten worse she does not want to go into an assisted living she is not a candidate to be by herself anymore.  So there is quite a bit of struggle going on there.           DETAILED EXAMINATION  08/22/24  :    Vitals:    08/22/24 1254   BP: 138/78   BP Location: Right arm   Pulse: 81   SpO2: 96%   Weight: 101.6 kg (224 lb)     Alert oriented. Head including the face is examined for malar rash, heliotropes, scarring, lupus pernio. Eyes examined for redness such as in episcleritis/scleritis, periorbital lesions.   Neck/ Face examined for parotid gland swelling, range of motion of neck.  Left upper and lower and right upper and lower extremities examined for tenderness, swelling, warmth of the appendicular joints, range of motion, edema, rash.  Some of the important findings included: she does not have evidence of synovitis in the palpable joints of the upper extremities.  No significant deformities of the digits.  No Heberden nodes.  Range of motion of the shoulders  show full abduction.  JLT without effusion or warmth of the knees.  she does not have dactylitis of the digits.     Patient Active Problem List    Diagnosis Date Noted    Cervical cancer screening 11/01/2023     Priority: Medium     Pt takes Methotrexate potential for immunosuppression  2014, 2017, 2020 NIL Pap's  10/26/23 NIL Pap. Neg HR HPV Plan cotest in 3 years       YUVAL (obstructive sleep apnea) 02/04/2022     Priority: Medium     Severe YUVAL      Morbid obesity (H) 10/05/2021     Priority: Medium    Asthma      Priority: Medium     Created by Conversion        Primary osteoarthritis involving multiple joints 11/01/2018     Priority: Medium    Dyslipidemia      Priority: Medium     Created by Conversion        Positive anti-CCP test 10/20/2016     Priority: Medium     Hypothyroidism      Priority: Medium     Created by Conversion  Replacement Utility updated for latest IMO load        Hypertension      Priority: Medium     Created by Conversion  Replacement Utility updated for latest IMO load        Vitamin D Deficiency      Priority: Medium     Created by Conversion  Replacement Utility updated for latest IMO load        Seropositive rheumatoid arthritis of multiple sites (H) 04/07/2016     Priority: Medium    High risk medication use 04/07/2016     Priority: Medium     Past Surgical History:   Procedure Laterality Date    BIOPSY  2007    Thyroidectomy    COLONOSCOPY  2014    EYE SURGERY  2020    Eyelids    GYN SURGERY  2005    Fibroids    MT THYROID LOBECTOMY,UNILAT      Description: Thyroid Surgery Thyroid Lobectomy Right Lobe;  Proc Date: 02/05/2007;      Past Medical History:   Diagnosis Date    Arthritis     Hypertension     Thyroid disease     Uncomplicated asthma      Allergies   Allergen Reactions    Clopidogrel Hives    Dilantin Infatabs [Phenytoin] Unknown    Phenytoin Sodium Extended [Phenytoin] Hives     Current Outpatient Medications   Medication Sig Dispense Refill    Bioflavonoid Products (VITAMIN C) CHEW       cholecalciferol, vitamin D3, (CHOLECALCIFEROL) 1,000 unit tablet [CHOLECALCIFEROL, VITAMIN D3, (CHOLECALCIFEROL) 1,000 UNIT TABLET] Take 1,000 Units by mouth daily.      folic acid (FOLVITE) 1 MG tablet TAKE ONE TABLET BY MOUTH ONE TIME DAILY 90 tablet 0    hydrochlorothiazide (HYDRODIURIL) 25 MG tablet Take 1 tablet (25 mg) by mouth daily 90 tablet 3    hydroxychloroquine (PLAQUENIL) 200 MG tablet Take 1 tablet (200 mg) by mouth 2 times daily (with meals) 180 tablet 1    levothyroxine (SYNTHROID/LEVOTHROID) 125 MCG tablet TAKE ONE TABLET BY MOUTH ONE TIME DAILY 90 tablet 2    lisinopril (ZESTRIL) 20 MG tablet TAKE ONE TABLET BY MOUTH ONE TIME DAILY 90 tablet 3    methotrexate 2.5 MG tablet TAKE 8 TABLETS BY MOUTH ONCE PER WEEK. 96 tablet 0    sulfaSALAzine  (AZULFIDINE) 500 MG tablet TAKE 2 TABLETS BY MOUTH TWICE A DAY. 240 tablet 0     family history includes Asthma in her mother; Breast Cancer (age of onset: 62.00) in her mother; Diabetes in her father; Heart Disease in her maternal grandfather; Hyperlipidemia in her father and mother; Hypertension in her father and mother; Osteoporosis in her mother; Sleep Apnea in her father.  Social Connections: Not on file          WBC Count   Date Value Ref Range Status   08/20/2024 5.9 4.0 - 11.0 10e3/uL Final     RBC Count   Date Value Ref Range Status   08/20/2024 4.43 3.80 - 5.20 10e6/uL Final     Hemoglobin   Date Value Ref Range Status   08/20/2024 13.5 11.7 - 15.7 g/dL Final     Hematocrit   Date Value Ref Range Status   08/20/2024 41.7 35.0 - 47.0 % Final     MCV   Date Value Ref Range Status   08/20/2024 94 78 - 100 fL Final     MCH   Date Value Ref Range Status   08/20/2024 30.5 26.5 - 33.0 pg Final     Platelet Count   Date Value Ref Range Status   08/20/2024 281 150 - 450 10e3/uL Final     AST   Date Value Ref Range Status   10/26/2023 44 0 - 45 U/L Final     Comment:     Specimen is hemolyzed which can falsely elevate AST. Analysis of a non-hemolyzed specimen may result in a lower value.    Specimen is hemolyzed which can falsely elevate AST. Analysis of a non-hemolyzed specimen may result in a lower value.  Reference intervals for this test were updated on 6/12/2023 to more accurately reflect our healthy population. There may be differences in the flagging of prior results with similar values performed with this method. Interpretation of those prior results can be made in the context of the updated reference intervals.     ALT   Date Value Ref Range Status   08/20/2024 19 0 - 50 U/L Final     Albumin   Date Value Ref Range Status   08/20/2024 4.4 3.5 - 5.2 g/dL Final   05/20/2022 4.0 3.5 - 5.0 g/dL Final     Alkaline Phosphatase   Date Value Ref Range Status   10/26/2023 64 35 - 104 U/L Final     Creatinine   Date  Value Ref Range Status   08/20/2024 1.03 (H) 0.51 - 0.95 mg/dL Final     GFR Estimate   Date Value Ref Range Status   08/20/2024 62 >60 mL/min/1.73m2 Final     Comment:     eGFR calculated using 2021 CKD-EPI equation.   05/04/2021 58 (L) >60 mL/min/1.73m2 Final     GFR Estimate If Black   Date Value Ref Range Status   05/04/2021 >60 >60 mL/min/1.73m2 Final

## 2024-09-26 ENCOUNTER — IMMUNIZATION (OUTPATIENT)
Dept: FAMILY MEDICINE | Facility: CLINIC | Age: 59
End: 2024-09-26
Payer: COMMERCIAL

## 2024-09-26 DIAGNOSIS — Z23 NEED FOR VACCINATION: Primary | ICD-10-CM

## 2024-09-26 NOTE — PROGRESS NOTES
Prior to immunization administration, verified patients identity using patient s name and date of birth. Please see Immunization Activity for additional information.     Screening Questionnaire for Adult Immunization    Are you sick today?   No   Do you have allergies to medications, food, a vaccine component or latex?   No   Have you ever had a serious reaction after receiving a vaccination?   No   Do you have a long-term health problem with heart, lung, kidney, or metabolic disease (e.g., diabetes), asthma, a blood disorder, no spleen, complement component deficiency, a cochlear implant, or a spinal fluid leak?  Are you on long-term aspirin therapy?   No   Do you have cancer, leukemia, HIV/AIDS, or any other immune system problem?   No   Do you have a parent, brother, or sister with an immune system problem?   No   In the past 3 months, have you taken medications that affect  your immune system, such as prednisone, other steroids, or anticancer drugs; drugs for the treatment of rheumatoid arthritis, Crohn s disease, or psoriasis; or have you had radiation treatments?   No   Have you had a seizure, or a brain or other nervous system problem?   No   During the past year, have you received a transfusion of blood or blood    products, or been given immune (gamma) globulin or antiviral drug?   No   For women: Are you pregnant or is there a chance you could become       pregnant during the next month?   No   Have you received any vaccinations in the past 4 weeks?   No     Immunization questionnaire answers were all negative.    I have reviewed the following standing orders:   This patient is due and qualifies for the Covid-19 vaccine.     Click here for COVID-19 Standing Order    I have reviewed the vaccines inclusion and exclusion criteria; No concerns regarding eligibility.     Patient instructed to remain in clinic for 15 minutes afterwards, and to report any adverse reactions.     Screening performed by Najma  HELENE Hernandez on 9/26/2024 at 1:12 PM.

## 2024-10-02 DIAGNOSIS — M05.79 SEROPOSITIVE RHEUMATOID ARTHRITIS OF MULTIPLE SITES (H): ICD-10-CM

## 2024-10-02 RX ORDER — SULFASALAZINE 500 MG/1
TABLET ORAL
Qty: 240 TABLET | Refills: 0 | Status: SHIPPED | OUTPATIENT
Start: 2024-10-02

## 2024-10-24 SDOH — HEALTH STABILITY: PHYSICAL HEALTH: ON AVERAGE, HOW MANY DAYS PER WEEK DO YOU ENGAGE IN MODERATE TO STRENUOUS EXERCISE (LIKE A BRISK WALK)?: 5 DAYS

## 2024-10-24 SDOH — HEALTH STABILITY: PHYSICAL HEALTH: ON AVERAGE, HOW MANY MINUTES DO YOU ENGAGE IN EXERCISE AT THIS LEVEL?: 60 MIN

## 2024-10-24 ASSESSMENT — ASTHMA QUESTIONNAIRES
ACT_TOTALSCORE: 25
QUESTION_4 LAST FOUR WEEKS HOW OFTEN HAVE YOU USED YOUR RESCUE INHALER OR NEBULIZER MEDICATION (SUCH AS ALBUTEROL): NOT AT ALL
QUESTION_1 LAST FOUR WEEKS HOW MUCH OF THE TIME DID YOUR ASTHMA KEEP YOU FROM GETTING AS MUCH DONE AT WORK, SCHOOL OR AT HOME: NONE OF THE TIME
QUESTION_5 LAST FOUR WEEKS HOW WOULD YOU RATE YOUR ASTHMA CONTROL: COMPLETELY CONTROLLED
QUESTION_3 LAST FOUR WEEKS HOW OFTEN DID YOUR ASTHMA SYMPTOMS (WHEEZING, COUGHING, SHORTNESS OF BREATH, CHEST TIGHTNESS OR PAIN) WAKE YOU UP AT NIGHT OR EARLIER THAN USUAL IN THE MORNING: NOT AT ALL
QUESTION_2 LAST FOUR WEEKS HOW OFTEN HAVE YOU HAD SHORTNESS OF BREATH: NOT AT ALL
ACT_TOTALSCORE: 25

## 2024-10-24 ASSESSMENT — SOCIAL DETERMINANTS OF HEALTH (SDOH): HOW OFTEN DO YOU GET TOGETHER WITH FRIENDS OR RELATIVES?: THREE TIMES A WEEK

## 2024-10-29 ENCOUNTER — OFFICE VISIT (OUTPATIENT)
Dept: INTERNAL MEDICINE | Facility: CLINIC | Age: 59
End: 2024-10-29
Payer: COMMERCIAL

## 2024-10-29 VITALS
TEMPERATURE: 97.9 F | SYSTOLIC BLOOD PRESSURE: 130 MMHG | RESPIRATION RATE: 18 BRPM | HEART RATE: 81 BPM | DIASTOLIC BLOOD PRESSURE: 84 MMHG | BODY MASS INDEX: 36 KG/M2 | HEIGHT: 66 IN | WEIGHT: 224 LBS | OXYGEN SATURATION: 96 %

## 2024-10-29 DIAGNOSIS — E03.9 ACQUIRED HYPOTHYROIDISM: ICD-10-CM

## 2024-10-29 DIAGNOSIS — Z13.1 SCREENING FOR DIABETES MELLITUS: ICD-10-CM

## 2024-10-29 DIAGNOSIS — E66.01 MORBID OBESITY (H): ICD-10-CM

## 2024-10-29 DIAGNOSIS — Z00.00 ANNUAL PHYSICAL EXAM: Primary | ICD-10-CM

## 2024-10-29 DIAGNOSIS — M05.79 SEROPOSITIVE RHEUMATOID ARTHRITIS OF MULTIPLE SITES (H): ICD-10-CM

## 2024-10-29 DIAGNOSIS — E55.9 VITAMIN D DEFICIENCY: ICD-10-CM

## 2024-10-29 DIAGNOSIS — G47.33 OSA (OBSTRUCTIVE SLEEP APNEA): ICD-10-CM

## 2024-10-29 DIAGNOSIS — I10 ESSENTIAL HYPERTENSION: ICD-10-CM

## 2024-10-29 DIAGNOSIS — E78.5 DYSLIPIDEMIA: ICD-10-CM

## 2024-10-29 PROBLEM — Z12.4 CERVICAL CANCER SCREENING: Status: RESOLVED | Noted: 2023-11-01 | Resolved: 2024-10-29

## 2024-10-29 PROCEDURE — 90471 IMMUNIZATION ADMIN: CPT | Performed by: INTERNAL MEDICINE

## 2024-10-29 PROCEDURE — 90673 RIV3 VACCINE NO PRESERV IM: CPT | Performed by: INTERNAL MEDICINE

## 2024-10-29 PROCEDURE — 99396 PREV VISIT EST AGE 40-64: CPT | Mod: 25 | Performed by: INTERNAL MEDICINE

## 2024-10-29 NOTE — PROGRESS NOTES
Preventive Care Visit  [facility]  Emily Bustillo MD, Internal Medicine  Oct 29, 2024      Assessment & Plan     Annual physical exam      Seropositive rheumatoid arthritis of multiple sites (H)  Well-controlled rheumatoid arthritis continue current regimen recent labs reviewed renal impairment as noted previously. Aware of avoidance of nonsteroidals. The residual pain is more likely osteoarthritis related. Discussed options. She is prepared to try duloxetine ,major side effects outlined.   - CBC with platelets; Future  - Comprehensive metabolic panel; Future    Morbid obesity (H)  Low calories diet and regular exercise discussed. GLP1s discussed too.    Essential hypertension  Stable on lisinopril and hydrochlorothiazide   - UA Macroscopic with reflex to Microscopic and Culture; Future    Acquired hypothyroidism  Stable on 125 mcg levothyroxine  - TSH with free T4 reflex; Future    Dyslipidemia  Diet controlled  - Lipid panel reflex to direct LDL Fasting; Future    YUVAL (obstructive sleep apnea)  Using CPAP every night    Screening for diabetes mellitus    - Hemoglobin A1c; Future    Vitamin D deficiency    - Vitamin D Deficiency; Future            Counseling  Appropriate preventive services were addressed with this patient via screening, questionnaire, or discussion as appropriate for fall prevention, nutrition, physical activity, Tobacco-use cessation, social engagement, weight loss and cognition.  Checklist reviewing preventive services available has been given to the patient.  Reviewed patient's diet, addressing concerns and/or questions.   She is at risk for psychosocial distress and has been provided with information to reduce risk.           M[place] is a 59 year old, presenting for the following:  Physical (Pt is not fasting)        10/29/2024    12:58 PM   Additional Questions   Roomed by Park BONILLA          Health Care Directive  Patient does not have a Health  Care Directive: Patient states has Advance Directive and will bring in a copy to clinic.      10/24/2024   General Health   How would you rate your overall physical health? Good   Feel stress (tense, anxious, or unable to sleep) Only a little      (!) STRESS CONCERN      10/24/2024   Nutrition   Three or more servings of calcium each day? Yes   Diet: Regular (no restrictions)   How many servings of fruit and vegetables per day? (!) 0-1   How many sweetened beverages each day? 0-1            10/24/2024   Exercise   Days per week of moderate/strenous exercise 5 days   Average minutes spent exercising at this level 60 min            10/24/2024   Social Factors   Frequency of gathering with friends or relatives Three times a week   Worry food won't last until get money to buy more No   Food not last or not have enough money for food? No   Do you have housing? (Housing is defined as stable permanent housing and does not include staying ouside in a car, in a tent, in an abandoned building, in an overnight shelter, or couch-surfing.) Yes   Are you worried about losing your housing? No   Lack of transportation? No   Unable to get utilities (heat,electricity)? No            10/24/2024   Fall Risk   Fallen 2 or more times in the past year? No    Trouble with walking or balance? No        Patient-reported          10/24/2024   Dental   Dentist two times every year? Yes            10/24/2024   TB Screening   Were you born outside of the [place]? No            Today's PHQ-2 Score:       10/29/2024    12:56 PM   PHQ-2 ( 1999 [employer])   Q1: Little interest or pleasure in doing things 0    Q2: Feeling down, depressed or hopeless 0    PHQ-2 Score 0    Q1: Little interest or pleasure in doing things Not at all   Q2: Feeling down, depressed or hopeless Not at all   PHQ-2 Score 0       Patient-reported           10/24/2024   Substance Use   Alcohol more than 3/day or more than 7/wk Not Applicable   Do you use any other substances  recreationally? No        Social History     Tobacco Use    Smoking status: Never     Passive exposure: Never    Smokeless tobacco: Never   Vaping Use    Vaping status: Never Used   Substance Use Topics    Alcohol use: No    Drug use: Never           10/19/2023   LAST FHS-7 RESULTS   1st degree relative breast or ovarian cancer Yes    Any relative bilateral breast cancer No    Any male have breast cancer No    Any ONE woman have BOTH breast AND ovarian cancer Yes    Any woman with breast cancer before 50yrs Unknown    2 or more relatives with breast AND/OR ovarian cancer No    2 or more relatives with breast AND/OR bowel cancer No        Patient-reported                10/24/2024   STI Screening   New sexual partner(s) since last STI/HIV test? No        History of abnormal Pap smear:         Latest Ref Rng & Units 10/26/2023    10:29 AM 10/1/2020     1:17 PM 3/21/2017     4:01 PM   PAP / HPV   PAP  Negative for Intraepithelial Lesion or Malignancy (NILM)  Negative for squamous intraepithelial lesion or malignancy  Electronically signed by Ese Porter CT (ASCP) on 10/5/2020 at  2:13 PM    Negative for squamous intraepithelial lesion or malignancy  Electronically signed by Leslye Rader CT (ASCP) on 3/24/2017 at  2:49 PM      HPV 16 DNA Negative Negative      HPV 18 DNA Negative Negative      Other HR HPV Negative Negative        ASCVD Risk   The 10-year ASCVD risk score (Cherri GANNON, et al., 2019) is: 5%    Values used to calculate the score:      Age: 59 years      Sex: Female      Is Non- : No      Diabetic: No      Tobacco smoker: No      Systolic Blood Pressure: 130 mmHg      Is BP treated: Yes      HDL Cholesterol: 64 mg/dL      Total Cholesterol: 226 mg/dL           Reviewed and updated as needed this visit by Provider                             Objective    Exam  /84 (BP Location: Right arm, Patient Position: Sitting)   Pulse 81   Temp 97.9  F (36.6  " C)   Resp 18   Ht 1.664 m (5' 5.5\")   Wt 101.6 kg (224 lb)   LMP  (LMP Unknown)   SpO2 96%   BMI 36.71 kg/m     Estimated body mass index is 36.71 kg/m  as calculated from the following:    Height as of this encounter: 1.664 m (5' 5.5\").    Weight as of this encounter: 101.6 kg (224 lb).    Physical Exam  General Appearance: Alert, cooperative, no distress, appears stated age.  Head: Normocephalic, without obvious abnormality, atraumatic  Eyes: PERRL, conjunctiva/corneas clear, EOM's intact  Ears: Normal TM's and external ear canals, both ears  Nose: Nares normal, septum midline,mucosa normal, no drainage  Throat: Lips, mucosa, and tongue normal; teeth and gums normal  Neck: Supple, symmetrical, trachea midline, no adenopathy;  thyroid: not enlarged, symmetric, no tenderness/mass/nodules; no carotid bruit or JVD  Back: Symmetric, no curvature, ROM normal, no CVA tenderness.  Lungs: Clear to auscultation bilaterally, respirations unlabored.  Breasts: No breast masses, tenderness, asymmetry, or nipple discharge.  Heart: Regular rate and rhythm, S1 and S2 normal, no murmur, rub, or gallop.  Abdomen: Soft, non-tender, bowel sounds active all four quadrants,  no masses, no organomegaly.  Extremities: Extremities normal, atraumatic, no cyanosis or edema.  Skin: Skin color, texture, turgor normal, no rashes or lesions.  Lymph nodes: Cervical, supraclavicular, and axillary nodes normal.  Neurologic: No focal neurological findings.          Signed Electronically by: Emily Bustillo MD    "

## 2024-10-29 NOTE — PATIENT INSTRUCTIONS
Flu vaccine today.    Schedule fasting lab appointment.    PAP in 2028.    Colonoscopy in 2027.    DXA scan in 2026.

## 2024-11-06 ENCOUNTER — LAB (OUTPATIENT)
Dept: LAB | Facility: CLINIC | Age: 59
End: 2024-11-06
Payer: COMMERCIAL

## 2024-11-06 DIAGNOSIS — M05.79 SEROPOSITIVE RHEUMATOID ARTHRITIS OF MULTIPLE SITES (H): ICD-10-CM

## 2024-11-06 DIAGNOSIS — E03.9 ACQUIRED HYPOTHYROIDISM: ICD-10-CM

## 2024-11-06 DIAGNOSIS — Z13.1 SCREENING FOR DIABETES MELLITUS: ICD-10-CM

## 2024-11-06 DIAGNOSIS — E55.9 VITAMIN D DEFICIENCY: ICD-10-CM

## 2024-11-06 DIAGNOSIS — E78.5 DYSLIPIDEMIA: ICD-10-CM

## 2024-11-06 DIAGNOSIS — I10 ESSENTIAL HYPERTENSION: ICD-10-CM

## 2024-11-06 LAB
ALBUMIN SERPL BCG-MCNC: 4.5 G/DL (ref 3.5–5.2)
ALBUMIN UR-MCNC: NEGATIVE MG/DL
ALP SERPL-CCNC: 71 U/L (ref 40–150)
ALT SERPL W P-5'-P-CCNC: 15 U/L (ref 0–50)
ANION GAP SERPL CALCULATED.3IONS-SCNC: 12 MMOL/L (ref 7–15)
APPEARANCE UR: CLEAR
AST SERPL W P-5'-P-CCNC: 31 U/L (ref 0–45)
BILIRUB SERPL-MCNC: 0.5 MG/DL
BILIRUB UR QL STRIP: NEGATIVE
BUN SERPL-MCNC: 15.7 MG/DL (ref 8–23)
CALCIUM SERPL-MCNC: 9.7 MG/DL (ref 8.8–10.4)
CHLORIDE SERPL-SCNC: 101 MMOL/L (ref 98–107)
CHOLEST SERPL-MCNC: 244 MG/DL
COLOR UR AUTO: YELLOW
CREAT SERPL-MCNC: 1.08 MG/DL (ref 0.51–0.95)
EGFRCR SERPLBLD CKD-EPI 2021: 58 ML/MIN/1.73M2
ERYTHROCYTE [DISTWIDTH] IN BLOOD BY AUTOMATED COUNT: 13.4 % (ref 10–15)
EST. AVERAGE GLUCOSE BLD GHB EST-MCNC: 105 MG/DL
FASTING STATUS PATIENT QL REPORTED: YES
FASTING STATUS PATIENT QL REPORTED: YES
GLUCOSE SERPL-MCNC: 109 MG/DL (ref 70–99)
GLUCOSE UR STRIP-MCNC: NEGATIVE MG/DL
HBA1C MFR BLD: 5.3 % (ref 0–5.6)
HCO3 SERPL-SCNC: 25 MMOL/L (ref 22–29)
HCT VFR BLD AUTO: 43.8 % (ref 35–47)
HDLC SERPL-MCNC: 66 MG/DL
HGB BLD-MCNC: 14.3 G/DL (ref 11.7–15.7)
HGB UR QL STRIP: NEGATIVE
KETONES UR STRIP-MCNC: NEGATIVE MG/DL
LDLC SERPL CALC-MCNC: 158 MG/DL
LEUKOCYTE ESTERASE UR QL STRIP: NEGATIVE
MCH RBC QN AUTO: 31.1 PG (ref 26.5–33)
MCHC RBC AUTO-ENTMCNC: 32.6 G/DL (ref 31.5–36.5)
MCV RBC AUTO: 95 FL (ref 78–100)
NITRATE UR QL: NEGATIVE
NONHDLC SERPL-MCNC: 178 MG/DL
PH UR STRIP: 6.5 [PH] (ref 5–8)
PLATELET # BLD AUTO: 282 10E3/UL (ref 150–450)
POTASSIUM SERPL-SCNC: 4.2 MMOL/L (ref 3.4–5.3)
PROT SERPL-MCNC: 7.5 G/DL (ref 6.4–8.3)
RBC # BLD AUTO: 4.6 10E6/UL (ref 3.8–5.2)
SODIUM SERPL-SCNC: 138 MMOL/L (ref 135–145)
SP GR UR STRIP: 1.01 (ref 1–1.03)
T4 FREE SERPL-MCNC: 1.34 NG/DL (ref 0.9–1.7)
TRIGL SERPL-MCNC: 99 MG/DL
TSH SERPL DL<=0.005 MIU/L-ACNC: 4.8 UIU/ML (ref 0.3–4.2)
UROBILINOGEN UR STRIP-ACNC: 0.2 E.U./DL
VIT D+METAB SERPL-MCNC: 61 NG/ML (ref 20–50)
WBC # BLD AUTO: 4.3 10E3/UL (ref 4–11)

## 2024-11-06 PROCEDURE — 80053 COMPREHEN METABOLIC PANEL: CPT

## 2024-11-06 PROCEDURE — 85027 COMPLETE CBC AUTOMATED: CPT

## 2024-11-06 PROCEDURE — 36415 COLL VENOUS BLD VENIPUNCTURE: CPT

## 2024-11-06 PROCEDURE — 84439 ASSAY OF FREE THYROXINE: CPT

## 2024-11-06 PROCEDURE — 83036 HEMOGLOBIN GLYCOSYLATED A1C: CPT

## 2024-11-06 PROCEDURE — 84443 ASSAY THYROID STIM HORMONE: CPT

## 2024-11-06 PROCEDURE — 82306 VITAMIN D 25 HYDROXY: CPT

## 2024-11-06 PROCEDURE — 80061 LIPID PANEL: CPT

## 2024-11-06 PROCEDURE — 81003 URINALYSIS AUTO W/O SCOPE: CPT

## 2024-11-07 DIAGNOSIS — E03.9 ACQUIRED HYPOTHYROIDISM: Primary | ICD-10-CM

## 2024-11-07 DIAGNOSIS — E78.5 DYSLIPIDEMIA: ICD-10-CM

## 2024-11-07 RX ORDER — LEVOTHYROXINE SODIUM 137 UG/1
TABLET ORAL
Qty: 90 TABLET | Refills: 3 | Status: SHIPPED | OUTPATIENT
Start: 2024-11-07

## 2024-11-11 ENCOUNTER — DOCUMENTATION ONLY (OUTPATIENT)
Dept: RHEUMATOLOGY | Facility: CLINIC | Age: 59
End: 2024-11-11
Payer: COMMERCIAL

## 2024-11-11 DIAGNOSIS — N18.31 STAGE 3A CHRONIC KIDNEY DISEASE (H): ICD-10-CM

## 2024-11-11 DIAGNOSIS — Z79.899 HIGH RISK MEDICATION USE: Primary | ICD-10-CM

## 2024-11-11 DIAGNOSIS — M15.0 PRIMARY OSTEOARTHRITIS INVOLVING MULTIPLE JOINTS: ICD-10-CM

## 2024-11-11 DIAGNOSIS — M05.79 SEROPOSITIVE RHEUMATOID ARTHRITIS OF MULTIPLE SITES (H): ICD-10-CM

## 2024-11-11 DIAGNOSIS — R76.8 POSITIVE ANTI-CCP TEST: ICD-10-CM

## 2024-11-21 ENCOUNTER — LAB (OUTPATIENT)
Dept: LAB | Facility: CLINIC | Age: 59
End: 2024-11-21
Payer: COMMERCIAL

## 2024-11-21 DIAGNOSIS — R76.8 POSITIVE ANTI-CCP TEST: ICD-10-CM

## 2024-11-21 DIAGNOSIS — M15.0 PRIMARY OSTEOARTHRITIS INVOLVING MULTIPLE JOINTS: ICD-10-CM

## 2024-11-21 DIAGNOSIS — Z79.899 HIGH RISK MEDICATION USE: ICD-10-CM

## 2024-11-21 DIAGNOSIS — M05.79 SEROPOSITIVE RHEUMATOID ARTHRITIS OF MULTIPLE SITES (H): ICD-10-CM

## 2024-11-21 DIAGNOSIS — N18.31 STAGE 3A CHRONIC KIDNEY DISEASE (H): ICD-10-CM

## 2024-11-21 LAB
ALBUMIN SERPL BCG-MCNC: 4.4 G/DL (ref 3.5–5.2)
ALT SERPL W P-5'-P-CCNC: 20 U/L (ref 0–50)
CREAT SERPL-MCNC: 1 MG/DL (ref 0.51–0.95)
EGFRCR SERPLBLD CKD-EPI 2021: 64 ML/MIN/1.73M2
ERYTHROCYTE [DISTWIDTH] IN BLOOD BY AUTOMATED COUNT: 13.3 % (ref 10–15)
HCT VFR BLD AUTO: 41.3 % (ref 35–47)
HGB BLD-MCNC: 13.4 G/DL (ref 11.7–15.7)
MCH RBC QN AUTO: 31.2 PG (ref 26.5–33)
MCHC RBC AUTO-ENTMCNC: 32.4 G/DL (ref 31.5–36.5)
MCV RBC AUTO: 96 FL (ref 78–100)
PLATELET # BLD AUTO: 271 10E3/UL (ref 150–450)
RBC # BLD AUTO: 4.29 10E6/UL (ref 3.8–5.2)
WBC # BLD AUTO: 4 10E3/UL (ref 4–11)

## 2024-11-25 ENCOUNTER — VIRTUAL VISIT (OUTPATIENT)
Dept: RHEUMATOLOGY | Facility: CLINIC | Age: 59
End: 2024-11-25
Payer: COMMERCIAL

## 2024-11-25 DIAGNOSIS — M05.79 SEROPOSITIVE RHEUMATOID ARTHRITIS OF MULTIPLE SITES (H): Primary | ICD-10-CM

## 2024-11-25 DIAGNOSIS — R76.8 POSITIVE ANTI-CCP TEST: ICD-10-CM

## 2024-11-25 DIAGNOSIS — Z79.899 HIGH RISK MEDICATION USE: ICD-10-CM

## 2024-11-25 DIAGNOSIS — M15.0 PRIMARY OSTEOARTHRITIS INVOLVING MULTIPLE JOINTS: ICD-10-CM

## 2024-11-25 DIAGNOSIS — N18.31 STAGE 3A CHRONIC KIDNEY DISEASE (H): ICD-10-CM

## 2024-11-25 PROCEDURE — 99214 OFFICE O/P EST MOD 30 MIN: CPT | Mod: 95 | Performed by: INTERNAL MEDICINE

## 2024-11-25 PROCEDURE — G2211 COMPLEX E/M VISIT ADD ON: HCPCS | Mod: 95 | Performed by: INTERNAL MEDICINE

## 2024-11-25 RX ORDER — METHOTREXATE 2.5 MG/1
TABLET ORAL
Qty: 96 TABLET | Refills: 0 | Status: SHIPPED | OUTPATIENT
Start: 2024-11-25

## 2024-11-25 RX ORDER — HYDROXYCHLOROQUINE SULFATE 200 MG/1
200 TABLET, FILM COATED ORAL 2 TIMES DAILY WITH MEALS
Qty: 180 TABLET | Refills: 1 | Status: SHIPPED | OUTPATIENT
Start: 2024-11-25

## 2024-11-25 NOTE — PROGRESS NOTES
Virtual Visit Details    Type of service:  Video Visit     Originating Location (pt. Location): Home    Distant Location (provider location):  On-site  Platform used for Video Visit: Devin  Joined the call at 11/25/2024, 11:25:54 am.  Left the call at 11/25/2024, 11:39:11 am.  You were on the call for 13 minutes 17 seconds .  This document was created using a software with less than 100% fidelity, at times resulting in unintended, even erroneous syntax and grammar.  The reader is advised to keep this under consideration while reviewing, interpreting this note.           ASSESSMENT AND PLAN:    Diagnoses and all orders for this visit:  Seropositive rheumatoid arthritis of multiple sites (H)  -     hydroxychloroquine (PLAQUENIL) 200 MG tablet; Take 1 tablet (200 mg) by mouth 2 times daily (with meals).  -     methotrexate 2.5 MG tablet; TAKE 8 TABLETS BY MOUTH ONCE PER WEEK.  High risk medication use  Primary osteoarthritis involving multiple joints  Stage 3a chronic kidney disease (H)  Positive anti-CCP test      Follow up in 6 months      HISTORY OF PRESENTING ILLNESS:  Isabela Yeit 59 year old is evaluated here via video/audio link.  This is for  rheumatoid arthritis.  This is polyarticular.  Positive anti-CCP antibody.  She has osteoarthritis.  She is doing great with regards to control of rheumatoid arthritis with the current triple regimen of hydroxychloroquine sulfasalazine and methotrexate with folic acid recent labs are normal.  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  Besides volunteering she has joined gym, water aerobics and is very happy for that.  She has noted triggering of the right ring finger which is mild.  She is aware of the options.  She has been under quite a bit of stress.  She has to manage her stepmother in [place] while her father with whom she has not had much interaction for a long time has felt ill and in the hospital and was a primary caregiver to  her, and she is to manage some of these issues.  So there is quite a bit of struggle going on there.  Unfortunately her dermatologist once again made her apprehensive about hydroxychloroquine and the risk of skin cancer, he indeed went on it seems to recommend that she might be interested in looking into Rinvoq for her RA symptoms.  Today once again we had a detailed discussion around these.  I have reassured her.  I have advised against the thought of Rinvoq for her at this juncture.  I have encouraged her to do her own deep dive research for the various resources no easily available for all of us to look into these issues.  Reminded of her eye examination.     ROS enquiry held for fever, ocular symptoms, rash, headache,  GI issues.  Today we also discussed the issues related to the current pandemic, the pros and cons of the current treatment plan, the CDC guidelines such as social distancing washing the hands covering the cough.  ALLERGIES:Clopidogrel, Dilantin infatabs [phenytoin], and Phenytoin sodium extended [phenytoin]    PAST MEDICAL/ACTIVE PROBLEMS/MEDICATION/SOCIAL DATA  Past Medical History:   Diagnosis Date    Arthritis     Hypertension     Thyroid disease     Uncomplicated asthma      History   Smoking Status    Never   Smokeless Tobacco    Never     Patient Active Problem List   Diagnosis    Hypothyroidism    Hypertension    Vitamin D Deficiency    Dyslipidemia    Seropositive rheumatoid arthritis of multiple sites (H)    High risk medication use    Primary osteoarthritis involving multiple joints    Morbid obesity (H)    YUVAL (obstructive sleep apnea)     Current Outpatient Medications   Medication Sig Dispense Refill    Bioflavonoid Products (VITAMIN C) CHEW       cholecalciferol, vitamin D3, (CHOLECALCIFEROL) 1,000 unit tablet [CHOLECALCIFEROL, VITAMIN D3, (CHOLECALCIFEROL) 1,000 UNIT TABLET] Take 1,000 Units by mouth daily.      DULoxetine (CYMBALTA) 20 MG capsule Take 1 capsule (20 mg) by mouth  daily. 30 capsule 2    folic acid (FOLVITE) 1 MG tablet TAKE ONE TABLET BY MOUTH ONE TIME DAILY 90 tablet 0    hydrochlorothiazide (HYDRODIURIL) 25 MG tablet Take 1 tablet (25 mg) by mouth daily 90 tablet 3    hydroxychloroquine (PLAQUENIL) 200 MG tablet Take 1 tablet (200 mg) by mouth 2 times daily (with meals). 180 tablet 1    levothyroxine (SYNTHROID/LEVOTHROID) 125 MCG tablet TAKE ONE TABLET BY MOUTH ONE TIME DAILY 90 tablet 2    levothyroxine (SYNTHROID/LEVOTHROID) 137 MCG tablet Alternate with 125 mcg daily 90 tablet 3    lisinopril (ZESTRIL) 20 MG tablet TAKE ONE TABLET BY MOUTH ONE TIME DAILY 90 tablet 3    methotrexate 2.5 MG tablet TAKE 8 TABLETS BY MOUTH ONCE PER WEEK. 96 tablet 0    sulfaSALAzine (AZULFIDINE) 500 MG tablet TAKE TWO TABLETS BY MOUTH TWICE DAILY 240 tablet 0         EXAMINATION:    Using the audio and video link as best as possible the constitutional, neck, neurologic, psych, skin, both upper extremities areas/organ system were evaluated during this assessment.  Some of the important findings: Alert, oriented, speech fluent.    Able to fully flex the digits, into fists bilaterally, wrist and elbow range of motion appear normal, abduction of the shoulder is normal.  Does not seem to have significant triggering of the right ring finger.      LAB / IMAGING DATA:  ALT   Date Value Ref Range Status   11/21/2024 20 0 - 50 U/L Final   11/06/2024 15 0 - 50 U/L Final   08/20/2024 19 0 - 50 U/L Final     Albumin   Date Value Ref Range Status   11/21/2024 4.4 3.5 - 5.2 g/dL Final   11/06/2024 4.5 3.5 - 5.2 g/dL Final   08/20/2024 4.4 3.5 - 5.2 g/dL Final   05/20/2022 4.0 3.5 - 5.0 g/dL Final   02/15/2022 4.0 3.5 - 5.0 g/dL Final   11/09/2021 4.2 3.5 - 5.0 g/dL Final       WBC Count   Date Value Ref Range Status   11/21/2024 4.0 4.0 - 11.0 10e3/uL Final   11/06/2024 4.3 4.0 - 11.0 10e3/uL Final     Hemoglobin   Date Value Ref Range Status   11/21/2024 13.4 11.7 - 15.7 g/dL Final   11/06/2024 14.3 11.7  "- 15.7 g/dL Final   08/20/2024 13.5 11.7 - 15.7 g/dL Final     Platelet Count   Date Value Ref Range Status   11/21/2024 271 150 - 450 10e3/uL Final   11/06/2024 282 150 - 450 10e3/uL Final   08/20/2024 281 150 - 450 10e3/uL Final       No results found for: \"HEATHER\"    "

## 2024-11-27 ENCOUNTER — HOSPITAL ENCOUNTER (OUTPATIENT)
Dept: CT IMAGING | Facility: CLINIC | Age: 59
Discharge: HOME | End: 2024-11-27
Attending: INTERNAL MEDICINE
Payer: COMMERCIAL

## 2024-11-27 DIAGNOSIS — E78.5 DYSLIPIDEMIA: ICD-10-CM

## 2024-11-27 LAB
CV CALCIUM SCORE AGATSTON LM: 0
CV CALCIUM SCORING AGATSON LAD: 0
CV CALCIUM SCORING AGATSTON CX: 0
CV CALCIUM SCORING AGATSTON RCA: 0
CV CALCIUM SCORING AGATSTON TOTAL: 0

## 2024-11-27 PROCEDURE — 75571 CT HRT W/O DYE W/CA TEST: CPT

## 2024-11-27 PROCEDURE — 75571 CT HRT W/O DYE W/CA TEST: CPT | Mod: 26 | Performed by: INTERNAL MEDICINE

## 2024-12-08 DIAGNOSIS — M05.79 SEROPOSITIVE RHEUMATOID ARTHRITIS OF MULTIPLE SITES (H): ICD-10-CM

## 2024-12-09 RX ORDER — SULFASALAZINE 500 MG/1
TABLET ORAL
Qty: 240 TABLET | Refills: 0 | Status: SHIPPED | OUTPATIENT
Start: 2024-12-09

## 2025-01-27 ENCOUNTER — MYC REFILL (OUTPATIENT)
Dept: INTERNAL MEDICINE | Facility: CLINIC | Age: 60
End: 2025-01-27
Payer: COMMERCIAL

## 2025-01-27 DIAGNOSIS — E03.9 HYPOTHYROIDISM, UNSPECIFIED TYPE: ICD-10-CM

## 2025-01-27 DIAGNOSIS — I10 ESSENTIAL HYPERTENSION: ICD-10-CM

## 2025-01-27 RX ORDER — HYDROCHLOROTHIAZIDE 25 MG/1
25 TABLET ORAL DAILY
Qty: 90 TABLET | Refills: 2 | Status: SHIPPED | OUTPATIENT
Start: 2025-01-27

## 2025-01-27 RX ORDER — LEVOTHYROXINE SODIUM 125 UG/1
125 TABLET ORAL DAILY
Qty: 90 TABLET | Refills: 0 | Status: SHIPPED | OUTPATIENT
Start: 2025-01-27

## 2025-02-20 DIAGNOSIS — M05.79 SEROPOSITIVE RHEUMATOID ARTHRITIS OF MULTIPLE SITES (H): ICD-10-CM

## 2025-02-20 DIAGNOSIS — Z79.899 HIGH RISK MEDICATION USE: ICD-10-CM

## 2025-02-20 RX ORDER — SULFASALAZINE 500 MG/1
TABLET ORAL
Qty: 240 TABLET | Refills: 0 | Status: SHIPPED | OUTPATIENT
Start: 2025-02-20

## 2025-02-20 RX ORDER — FOLIC ACID 1 MG/1
1000 TABLET ORAL DAILY
Qty: 90 TABLET | Refills: 0 | Status: SHIPPED | OUTPATIENT
Start: 2025-02-20

## 2025-02-22 ENCOUNTER — OFFICE VISIT (OUTPATIENT)
Dept: URGENT CARE | Facility: URGENT CARE | Age: 60
End: 2025-02-22
Payer: COMMERCIAL

## 2025-02-22 VITALS
DIASTOLIC BLOOD PRESSURE: 80 MMHG | RESPIRATION RATE: 18 BRPM | OXYGEN SATURATION: 97 % | SYSTOLIC BLOOD PRESSURE: 125 MMHG | HEART RATE: 85 BPM | TEMPERATURE: 97.8 F

## 2025-02-22 DIAGNOSIS — H66.001 RIGHT ACUTE SUPPURATIVE OTITIS MEDIA: Primary | ICD-10-CM

## 2025-02-22 PROCEDURE — 99213 OFFICE O/P EST LOW 20 MIN: CPT | Performed by: PHYSICIAN ASSISTANT

## 2025-02-22 NOTE — PROGRESS NOTES
"  Assessment & Plan     Right acute suppurative otitis media  Patient presents with ear infection. Antibiotic prescribed and side effects discussed. Advised to take full course of antibiotics. Take OTC pain relievers a needed. Symptoms should improve over the next 1-2 days. Follow up in clinic as needed  - amoxicillin-clavulanate (AUGMENTIN) 875-125 MG tablet; Take 1 tablet by mouth 2 times daily.          BMI  Estimated body mass index is 36.71 kg/m  as calculated from the following:    Height as of 10/29/24: 1.664 m (5' 5.5\").    Weight as of 10/29/24: 101.6 kg (224 lb).             No follow-ups on file.    Mau Mar is a 59 year old, presenting for the following health issues:  Ear Problem (Right ear pain worse then left )    HPI     Acute Illness  Acute illness concerns: URI   Onset/Duration: 2 weeks ago  Symptoms:  Fever: No  Chills/Sweats: No  Headache (location?): No  Sinus Pressure: No  Conjunctivitis:  No  Ear Pain: YES- right ear, crackling in ear, feels like it needs to pop  Rhinorrhea: YES  Congestion: YES  Sore Throat: No  Cough: YES - sometime wet and sometimes dry, laying down at night   Wheeze: No  Decreased Appetite: No  Nausea: No  Vomiting: No  Diarrhea: No  Dysuria/Freq.: No  Dysuria or Hematuria: No  Fatigue/Achiness: YES  Sick/Strep Exposure: No  Therapies tried and outcome: warm wash cloth, and blowing nose         Review of Systems  Constitutional, HEENT, cardiovascular, pulmonary, gi and gu systems are negative, except as otherwise noted.      Objective    /80   Pulse 85   Temp 97.8  F (36.6  C) (Oral)   Resp 18   LMP  (LMP Unknown)   SpO2 97%   There is no height or weight on file to calculate BMI.  Physical Exam  Constitutional:       Appearance: Normal appearance.   HENT:      Right Ear: Ear canal normal. A middle ear effusion is present. Tympanic membrane is injected and bulging.      Left Ear: Tympanic membrane and ear canal normal.      Nose: Congestion present. No " rhinorrhea.      Right Sinus: No maxillary sinus tenderness or frontal sinus tenderness.      Left Sinus: No maxillary sinus tenderness or frontal sinus tenderness.      Mouth/Throat:      Mouth: Mucous membranes are moist.      Pharynx: Oropharynx is clear. Posterior oropharyngeal erythema present.   Cardiovascular:      Rate and Rhythm: Normal rate and regular rhythm.   Pulmonary:      Effort: Pulmonary effort is normal.      Breath sounds: Normal breath sounds.   Lymphadenopathy:      Cervical: No cervical adenopathy.   Neurological:      Mental Status: She is alert.   Psychiatric:         Mood and Affect: Mood normal.         Behavior: Behavior normal.                    Signed Electronically by: YARON Burks

## 2025-02-22 NOTE — PATIENT INSTRUCTIONS
Can take Afrin nasal spray for take off and landing for both flights. Do not use the spray more than that

## 2025-02-25 ENCOUNTER — LAB (OUTPATIENT)
Dept: LAB | Facility: CLINIC | Age: 60
End: 2025-02-25
Payer: COMMERCIAL

## 2025-02-25 ENCOUNTER — MYC REFILL (OUTPATIENT)
Dept: RHEUMATOLOGY | Facility: CLINIC | Age: 60
End: 2025-02-25

## 2025-02-25 DIAGNOSIS — N18.31 STAGE 3A CHRONIC KIDNEY DISEASE (H): ICD-10-CM

## 2025-02-25 DIAGNOSIS — M15.0 PRIMARY OSTEOARTHRITIS INVOLVING MULTIPLE JOINTS: ICD-10-CM

## 2025-02-25 DIAGNOSIS — M05.79 SEROPOSITIVE RHEUMATOID ARTHRITIS OF MULTIPLE SITES (H): ICD-10-CM

## 2025-02-25 DIAGNOSIS — R76.8 POSITIVE ANTI-CCP TEST: ICD-10-CM

## 2025-02-25 DIAGNOSIS — Z79.899 HIGH RISK MEDICATION USE: ICD-10-CM

## 2025-02-25 LAB
ALBUMIN SERPL BCG-MCNC: 4.5 G/DL (ref 3.5–5.2)
ALT SERPL W P-5'-P-CCNC: 25 U/L (ref 0–50)
CREAT SERPL-MCNC: 0.98 MG/DL (ref 0.51–0.95)
EGFRCR SERPLBLD CKD-EPI 2021: 65 ML/MIN/1.73M2
ERYTHROCYTE [DISTWIDTH] IN BLOOD BY AUTOMATED COUNT: 13.2 % (ref 10–15)
HCT VFR BLD AUTO: 42.4 % (ref 35–47)
HGB BLD-MCNC: 13.6 G/DL (ref 11.7–15.7)
MCH RBC QN AUTO: 31 PG (ref 26.5–33)
MCHC RBC AUTO-ENTMCNC: 32.1 G/DL (ref 31.5–36.5)
MCV RBC AUTO: 97 FL (ref 78–100)
PLATELET # BLD AUTO: 320 10E3/UL (ref 150–450)
RBC # BLD AUTO: 4.39 10E6/UL (ref 3.8–5.2)
WBC # BLD AUTO: 7.7 10E3/UL (ref 4–11)

## 2025-02-25 PROCEDURE — 82040 ASSAY OF SERUM ALBUMIN: CPT

## 2025-02-25 PROCEDURE — 82565 ASSAY OF CREATININE: CPT

## 2025-02-25 PROCEDURE — 36415 COLL VENOUS BLD VENIPUNCTURE: CPT

## 2025-02-25 PROCEDURE — 85027 COMPLETE CBC AUTOMATED: CPT

## 2025-02-25 PROCEDURE — 84460 ALANINE AMINO (ALT) (SGPT): CPT

## 2025-02-26 RX ORDER — METHOTREXATE 2.5 MG/1
TABLET ORAL
Qty: 96 TABLET | Refills: 0 | Status: SHIPPED | OUTPATIENT
Start: 2025-02-26

## 2025-03-10 ENCOUNTER — OFFICE VISIT (OUTPATIENT)
Dept: INTERNAL MEDICINE | Facility: CLINIC | Age: 60
End: 2025-03-10
Payer: COMMERCIAL

## 2025-03-10 VITALS
HEIGHT: 66 IN | TEMPERATURE: 98.4 F | RESPIRATION RATE: 18 BRPM | OXYGEN SATURATION: 95 % | DIASTOLIC BLOOD PRESSURE: 64 MMHG | BODY MASS INDEX: 35.84 KG/M2 | HEART RATE: 84 BPM | SYSTOLIC BLOOD PRESSURE: 122 MMHG | WEIGHT: 223 LBS

## 2025-03-10 DIAGNOSIS — I10 ESSENTIAL HYPERTENSION: ICD-10-CM

## 2025-03-10 DIAGNOSIS — H65.91 OME (OTITIS MEDIA WITH EFFUSION), RIGHT: Primary | ICD-10-CM

## 2025-03-10 PROBLEM — K63.5 POLYP OF COLON: Status: ACTIVE | Noted: 2024-01-24

## 2025-03-10 PROCEDURE — 3074F SYST BP LT 130 MM HG: CPT | Performed by: NURSE PRACTITIONER

## 2025-03-10 PROCEDURE — 3078F DIAST BP <80 MM HG: CPT | Performed by: NURSE PRACTITIONER

## 2025-03-10 PROCEDURE — 99213 OFFICE O/P EST LOW 20 MIN: CPT | Performed by: NURSE PRACTITIONER

## 2025-03-10 PROCEDURE — G2211 COMPLEX E/M VISIT ADD ON: HCPCS | Performed by: NURSE PRACTITIONER

## 2025-03-10 NOTE — PROGRESS NOTES
"  Assessment & Plan     OME (otitis media with effusion), right  Explained to her that this should get better in time.  No additional antibiotics indicated right now.    We talked about using intranasal steroid sprays, Flonase 1 to 2 puffs each nostril once daily.    If symptoms are not improving after a few weeks, she can send me a MyChart message and we could consider ENT referral    Hypertension  Controlled on hydrochlorothiazide and lisinopril    The longitudinal plan of care for the diagnosis(es)/condition(s) as documented were addressed during this visit. Due to the added complexity in care, I will continue to support Isabela in the subsequent management and with ongoing continuity of care.      Subjective   Isabela is a 59 year old, presenting for the following health issues:    Hospital F/U (Urgent Care follow up for ear pain,, dx with right ear infection, finished ABX but still has ringing, crinkles when blows nose)      3/10/2025     9:47 AM   Additional Questions   Roomed by Emily GIRALDO     HPI      Right-sided ear discomfort.  No fevers.  No URI or sinus symptoms.    Was diagnosed with ear infection at the end of February, treated with amoxicillin/clavulanate.    Describes fullness, clicking/popping but no overt pain in the right ear    Objective    /64 (BP Location: Right arm, Patient Position: Sitting, Cuff Size: Adult Regular)   Pulse 84   Temp 98.4  F (36.9  C)   Resp 18   Ht 1.664 m (5' 5.5\")   Wt 101.2 kg (223 lb)   LMP  (LMP Unknown)   SpO2 95%   BMI 36.54 kg/m    Body mass index is 36.54 kg/m .  Physical Exam   Right TM reveals posterior bubbles superiorly              Signed Electronically by: Jenaro Rcih, CNP    "

## 2025-03-17 ENCOUNTER — LAB (OUTPATIENT)
Dept: LAB | Facility: CLINIC | Age: 60
End: 2025-03-17
Payer: COMMERCIAL

## 2025-03-17 DIAGNOSIS — E03.9 ACQUIRED HYPOTHYROIDISM: ICD-10-CM

## 2025-03-17 LAB — TSH SERPL DL<=0.005 MIU/L-ACNC: 2.09 UIU/ML (ref 0.3–4.2)

## 2025-03-17 PROCEDURE — 36415 COLL VENOUS BLD VENIPUNCTURE: CPT

## 2025-03-17 PROCEDURE — 84443 ASSAY THYROID STIM HORMONE: CPT

## 2025-03-19 ENCOUNTER — MYC MEDICAL ADVICE (OUTPATIENT)
Dept: INTERNAL MEDICINE | Facility: CLINIC | Age: 60
End: 2025-03-19
Payer: COMMERCIAL

## 2025-03-28 ENCOUNTER — ANCILLARY ORDERS (OUTPATIENT)
Dept: INTERNAL MEDICINE | Facility: CLINIC | Age: 60
End: 2025-03-28
Payer: COMMERCIAL

## 2025-03-28 ENCOUNTER — ANCILLARY ORDERS (OUTPATIENT)
Dept: MAMMOGRAPHY | Facility: HOSPITAL | Age: 60
End: 2025-03-28
Payer: COMMERCIAL

## 2025-03-28 DIAGNOSIS — Z12.31 VISIT FOR SCREENING MAMMOGRAM: Primary | ICD-10-CM

## 2025-04-16 ENCOUNTER — ANCILLARY PROCEDURE (OUTPATIENT)
Dept: MAMMOGRAPHY | Facility: CLINIC | Age: 60
End: 2025-04-16
Attending: INTERNAL MEDICINE
Payer: COMMERCIAL

## 2025-04-16 DIAGNOSIS — Z12.31 VISIT FOR SCREENING MAMMOGRAM: ICD-10-CM

## 2025-04-16 PROCEDURE — 77063 BREAST TOMOSYNTHESIS BI: CPT

## 2025-04-21 DIAGNOSIS — E03.9 HYPOTHYROIDISM, UNSPECIFIED TYPE: ICD-10-CM

## 2025-04-22 RX ORDER — LEVOTHYROXINE SODIUM 125 UG/1
125 TABLET ORAL DAILY
Qty: 90 TABLET | Refills: 2 | Status: SHIPPED | OUTPATIENT
Start: 2025-04-22

## 2025-05-10 ASSESSMENT — ASTHMA QUESTIONNAIRES
QUESTION_2 LAST FOUR WEEKS HOW OFTEN HAVE YOU HAD SHORTNESS OF BREATH: NOT AT ALL
ACT_TOTALSCORE: 25
QUESTION_1 LAST FOUR WEEKS HOW MUCH OF THE TIME DID YOUR ASTHMA KEEP YOU FROM GETTING AS MUCH DONE AT WORK, SCHOOL OR AT HOME: NONE OF THE TIME
QUESTION_4 LAST FOUR WEEKS HOW OFTEN HAVE YOU USED YOUR RESCUE INHALER OR NEBULIZER MEDICATION (SUCH AS ALBUTEROL): NOT AT ALL
QUESTION_3 LAST FOUR WEEKS HOW OFTEN DID YOUR ASTHMA SYMPTOMS (WHEEZING, COUGHING, SHORTNESS OF BREATH, CHEST TIGHTNESS OR PAIN) WAKE YOU UP AT NIGHT OR EARLIER THAN USUAL IN THE MORNING: NOT AT ALL
QUESTION_5 LAST FOUR WEEKS HOW WOULD YOU RATE YOUR ASTHMA CONTROL: COMPLETELY CONTROLLED

## 2025-05-12 DIAGNOSIS — M05.79 SEROPOSITIVE RHEUMATOID ARTHRITIS OF MULTIPLE SITES (H): ICD-10-CM

## 2025-05-13 RX ORDER — METHOTREXATE 2.5 MG/1
TABLET ORAL
Qty: 96 TABLET | Refills: 0 | Status: SHIPPED | OUTPATIENT
Start: 2025-05-13

## 2025-05-14 ASSESSMENT — SLEEP AND FATIGUE QUESTIONNAIRES
HOW LIKELY ARE YOU TO NOD OFF OR FALL ASLEEP WHEN YOU ARE A PASSENGER IN A CAR FOR AN HOUR WITHOUT A BREAK: MODERATE CHANCE OF DOZING
HOW LIKELY ARE YOU TO NOD OFF OR FALL ASLEEP WHILE WATCHING TV: SLIGHT CHANCE OF DOZING
HOW LIKELY ARE YOU TO NOD OFF OR FALL ASLEEP WHILE LYING DOWN TO REST IN THE AFTERNOON WHEN CIRCUMSTANCES PERMIT: MODERATE CHANCE OF DOZING
HOW LIKELY ARE YOU TO NOD OFF OR FALL ASLEEP WHILE SITTING INACTIVE IN A PUBLIC PLACE: WOULD NEVER DOZE
HOW LIKELY ARE YOU TO NOD OFF OR FALL ASLEEP WHILE SITTING AND READING: SLIGHT CHANCE OF DOZING
HOW LIKELY ARE YOU TO NOD OFF OR FALL ASLEEP IN A CAR, WHILE STOPPED FOR A FEW MINUTES IN TRAFFIC: WOULD NEVER DOZE
HOW LIKELY ARE YOU TO NOD OFF OR FALL ASLEEP WHILE SITTING AND TALKING TO SOMEONE: WOULD NEVER DOZE
HOW LIKELY ARE YOU TO NOD OFF OR FALL ASLEEP WHILE SITTING QUIETLY AFTER LUNCH WITHOUT ALCOHOL: SLIGHT CHANCE OF DOZING

## 2025-05-15 ENCOUNTER — OFFICE VISIT (OUTPATIENT)
Dept: INTERNAL MEDICINE | Facility: CLINIC | Age: 60
End: 2025-05-15
Payer: COMMERCIAL

## 2025-05-15 VITALS
TEMPERATURE: 97.5 F | OXYGEN SATURATION: 97 % | DIASTOLIC BLOOD PRESSURE: 78 MMHG | HEART RATE: 79 BPM | RESPIRATION RATE: 14 BRPM | WEIGHT: 225 LBS | SYSTOLIC BLOOD PRESSURE: 132 MMHG | BODY MASS INDEX: 36.87 KG/M2

## 2025-05-15 DIAGNOSIS — M05.79 SEROPOSITIVE RHEUMATOID ARTHRITIS OF MULTIPLE SITES (H): ICD-10-CM

## 2025-05-15 DIAGNOSIS — H93.11 EAR RINGING, RIGHT: ICD-10-CM

## 2025-05-15 DIAGNOSIS — R23.3 EASY BRUISING: ICD-10-CM

## 2025-05-15 DIAGNOSIS — E66.01 MORBID OBESITY (H): ICD-10-CM

## 2025-05-15 DIAGNOSIS — N18.31 STAGE 3A CHRONIC KIDNEY DISEASE (H): ICD-10-CM

## 2025-05-15 DIAGNOSIS — M65.30 TRIGGER FINGER, ACQUIRED: Primary | ICD-10-CM

## 2025-05-15 NOTE — PROGRESS NOTES
Assessment & Plan     Trigger finger, acquired  Right middle finger, painful, go stuck more often. She will discuss with her Rheumatologist and will see Hand specialist at [facility].  - Orthopedic  Referral; Future    Seropositive rheumatoid arthritis of multiple sites (H)  Well-controlled rheumatoid arthritis continue current regimen   Aware of avoidance of nonsteroidals.     Morbid obesity (H)  Low calories diet and regular exercise discussed.     Essential hypertension  Stable on lisinopril and hydrochlorothiazide     Stage 3a chronic kidney disease (H)  Component      Latest Ref Rng 2/25/2025  8:08 AM   Creatinine      0.51 - 0.95 mg/dL 0.98 (H)    GFR Estimate      >60 mL/min/1.73m2 65       Stable.    Easy bruising  Component      Latest Ref Rng 2/25/2025  8:08 AM   WBC      4.0 - 11.0 10e3/uL 7.7    RBC Count      3.80 - 5.20 10e6/uL 4.39    Hemoglobin      11.7 - 15.7 g/dL 13.6    Hematocrit      35.0 - 47.0 % 42.4    MCV      78 - 100 fL 97    MCH      26.5 - 33.0 pg 31.0    MCHC      31.5 - 36.5 g/dL 32.1    RDW      10.0 - 15.0 % 13.2    Platelet Count      150 - 450 10e3/uL 320      She noticed few more bruises on her legs and arms, with no trauma. Bruised resolved now. Plt normal, not on the anticoagulation, NSAIDs. Will continue monitoring.        Acquired hypothyroidism  Stable on 125 mcg levothyroxine    Ear ringing, right  She had right ear infection in March, now has some residual ringing. We will give it few more months and hopefully will resolve. She is using Flonase on and off.  On the exam today, bilat clear canals, reggie tympanic membranes.    The longitudinal plan of care for the diagnosis(es)/condition(s) as documented were addressed during this visit. Due to the added complexity in care, I will continue to support Isabela in the subsequent management and with ongoing continuity of care.      BMI  Estimated body mass index is 36.87 kg/m  as calculated from the following:    Height as of  "3/10/25: 1.664 m (5' 5.5\").    Weight as of this encounter: 102.1 kg (225 lb).             Subjective   Isabela is a 59 year old, presenting for the following health issues:  Finger (Right trigger finger still bothersome )        5/15/2025     9:40 AM   Additional Questions   Roomed by Ryan MATIAS     History of Present Illness       Reason for visit:  Trigger finger, right ring finger    She eats 2-3 servings of fruits and vegetables daily.She consumes 0 sweetened beverage(s) daily.She exercises with enough effort to increase her heart rate 30 to 60 minutes per day.  She exercises with enough effort to increase her heart rate 3 or less days per week.   She is taking medications regularly.                      Objective    /78 (BP Location: Right arm, Patient Position: Sitting, Cuff Size: Adult Large)   Pulse 79   Temp 97.5  F (36.4  C) (Temporal)   Resp 14   Wt 102.1 kg (225 lb)   LMP  (LMP Unknown)   SpO2 97%   BMI 36.87 kg/m    Body mass index is 36.87 kg/m .  Physical Exam               Signed Electronically by: Emily Bustillo MD    "

## 2025-05-19 ENCOUNTER — VIRTUAL VISIT (OUTPATIENT)
Dept: SLEEP MEDICINE | Facility: CLINIC | Age: 60
End: 2025-05-19
Payer: COMMERCIAL

## 2025-05-19 ENCOUNTER — PATIENT OUTREACH (OUTPATIENT)
Dept: CARE COORDINATION | Facility: CLINIC | Age: 60
End: 2025-05-19

## 2025-05-19 VITALS — BODY MASS INDEX: 34.53 KG/M2 | HEART RATE: 80 BPM | HEIGHT: 67 IN | WEIGHT: 220 LBS

## 2025-05-19 DIAGNOSIS — G47.33 OSA (OBSTRUCTIVE SLEEP APNEA): Primary | ICD-10-CM

## 2025-05-19 DIAGNOSIS — F51.02 ADJUSTMENT INSOMNIA: ICD-10-CM

## 2025-05-19 PROCEDURE — 1125F AMNT PAIN NOTED PAIN PRSNT: CPT | Mod: 95 | Performed by: PHYSICIAN ASSISTANT

## 2025-05-19 PROCEDURE — 98006 SYNCH AUDIO-VIDEO EST MOD 30: CPT | Performed by: PHYSICIAN ASSISTANT

## 2025-05-19 ASSESSMENT — PAIN SCALES - GENERAL: PAINLEVEL_OUTOF10: MILD PAIN (2)

## 2025-05-19 NOTE — PROGRESS NOTES
Virtual Visit Details    Type of service:  Video Visit   Start Time: 10:57 AM   End Time: 11:32 AM    Originating Location (pt. Location): Home    Distant Location (provider location):  Off-site  Platform used for Video Visit: [facility]    CPAP Follow-Up Visit:    Date on this visit: 5/19/2025    Isabela Olivo has a follow-up visit today to review her CPAP use for YUVAL.      Previous Study Results:   HST on 2/2/22 (227#, BMI 36.92) showing AHI 36.6, supine AHI 64.5, left side AHI 35.9, right side AHI 26.3, oxygen artie 81%, 24.5 minutes spent <=88%.     Patient was set up with auto CPAP 5-15 cm H2O on 3/7/2022.     [facility]   Auto-PAP 5.0 - 10.0 cmH2O 30 day usage data:    66% of days with > 4 hours of use. 8/30 days with no use.   Average use 256 minutes per day.   95%ile Leak 8.87 L/min.   CPAP 95% pressure 8.8 cm.   AHI 3.17 events per hour.       She feels CPAP is going fine. She has sleep issues for other reasons. His father passed away in December. She has some trouble shutting her mind off.  She retired recently.   She has allergies, which sometimes interferes with using CPAP. She uses Flonase. She also has a humidifier.       No specialty comments available.    Do you use a CPAP Machine at home: (Patient-Rptd) Yes  Overall, on a scale of 0-10 how would you rate your CPAP (0 poor, 10 great): (Patient-Rptd) 6    What type of mask do you use: Dreamwear nasal  Is your mask comfortable:   yes  If not, why:    How often do you replace supplies: filters often (gets on [employer]), headgear q 6 months. Washes everything regularly.    Is your mask leaking: (Patient-Rptd) No  If yes, where do you feel it:    How many night per week does the mask leak (0-7):      Do you notice snoring with mask on: (Patient-Rptd) No  Do you notice gasping arousals with mask on: (Patient-Rptd) No  Are you having significant oral or nasal dryness: (Patient-Rptd) No  Are you using the humidifier: yes  Does the water chamber run out before the night  is over:no  Do you get condensation in the mask or hose:no  Is the pressure setting comfortable: (Patient-Rptd) Yes  If not, why:      Typical bedtime: (Patient-Rptd) 12am  Sleep latency on PAP therapy: (Patient-Rptd) 10 minutes, sometimes has difficulty shutting her mind off, a few days per week.  Typical wake time: (Patient-Rptd) 7am  Wakes 1-2 times per night for varies minutes. Reason for waking: restroom  How many hours on average per night are you using PAP therapy: (Patient-Rptd) 7  How many hours are you sleeping per night: (Patient-Rptd) 6  Do you feel well rested in the morning:      Naps: 2 times per week for 20 minutes.   She gets tired after lunch.      Weight change since sleep study: 220 lbs      Past medical/surgical history, family history, social history, medications and allergies were reviewed.      Problem List:  Patient Active Problem List    Diagnosis Date Noted    Polyp of colon 01/24/2024     Priority: Medium    YUVAL (obstructive sleep apnea) 02/04/2022     Priority: Medium     Severe YUVAL      Morbid obesity (H) 10/05/2021     Priority: Medium    Stage 3a chronic kidney disease (H) 02/08/2021     Priority: Medium    Primary osteoarthritis involving multiple joints 11/01/2018     Priority: Medium    Dyslipidemia      Priority: Medium     Created by Conversion        Hypothyroidism      Priority: Medium     Created by Conversion  Replacement Utility updated for latest IMO load        Hypertension      Priority: Medium     Created by Conversion  Replacement Utility updated for latest IMO load        Vitamin D Deficiency      Priority: Medium     Created by Conversion  Replacement Utility updated for latest IMO load        Seropositive rheumatoid arthritis of multiple sites (H) 04/07/2016     Priority: Medium    High risk medication use 04/07/2016     Priority: Medium        Impression/Plan:    (G47.33) YUVAL (obstructive sleep apnea)  (primary encounter diagnosis)  Comment: Isabela feels everything is  going well with CPAP.  She uses it regularly and benefits from use.  Her compliance is just slightly below target due to allergies as well as frequent traveling to deal with her father's estate (in [place]).  She sometimes gets tangled in the hose when she puts her arms up above her head.  Plan: Comprehensive DME        Continue auto CPAP 5-10 cm. A prescription was written for new supplies. We reviewed recommendations for cleaning and replacing supplies.  She was shown CPAP hose stands online.      (F51.02) Adjustment insomnia  Comment: Isabela has been having some difficulty sleeping lately due to her father passing and trying to deal with his estate as well as her stepmother who has dementia.  Plan: We talked about setting aside worry time earlier in the evening to compartmentalize thoughts. We discussed options for listening to something to distract thoughts. We discussed stimulus control.  She does plan to get back into exercise as soon as her schedule lightens up.     She will follow up with me in about 1 year(s).     37 minutes were spent on the date of the encounter doing chart review, history and exam, documentation and further activities as noted above.     Bennett Goltz, PA-C    CC: Referred Self

## 2025-05-19 NOTE — PATIENT INSTRUCTIONS
General recommendations for sleep problems (Insomnia)  Allow 2-4 weeks to see results     Establish a regular sleep schedule    Most people only need 7-8 hours of sleep.  Don't be in bed longer than you need     to sleep or you will end up spending more time awake in bed. This trains your    brain to think of the bed as a place to not sleep.  Go to bed at same time each night   Get up at same time each day - Set an alarm everyday (even weekends). This is one of    the most important tips. It prevents you from relying on your insomnia to get you    up on time for your day. That actually reinforces insomnia. It also will help your    body get into a pattern where you start feeling tired at a consistent time each    night.  The body functions best when you keep a consistent routine.  Avoid sleeping-in and napping. Anytime you sleep during the day, you will be less tired at    night. You may be tired enough to fall asleep, but you will wake more in the    middle of the night because you will have met your sleep need before the night is    done.   Cut down time in bed (if not asleep, get up)- Use your bed only for sleep and sex    Anytime you spend time in bed doing activities other than sleep (reading,    watching TV, working, playing on the computer or phone, or even just laying in    bed trying to sleep), you are training  your brain to think of the bed as a place to    do activities other than sleep. If you are not falling asleep within 20-30 minutes,    get out of bed. While out of bed, avoid bright lights. Avoid work or chores. Being    productive in the middle of the night reinforces waking up at night. Find relaxing,    not particularly entertaining activities like reading, listening to music, or relaxation    exercises. Go back to bed if you start feeling groggy, or after about 30 minutes,    even if not feeling very tired. Sometimes, just getting out of bed stops the pattern    of getting frustrated about  laying in bed not sleeping, and that can help you fall    asleep.   Avoid trying to force yourself to sleep- sleep is not like everything else. The harder you    work at most things, the more you can accomplish. The harder you work at    sleep, the less you will sleep.     Make the bedroom comfortable - quiet, dark and cool are better. Consider ear plugs    (silicon). Use dark blinds or wear an eye mask if needed     Make a relaxing routine prior to bedtime  Relaxation exercises:   Progressive muscle relaxation: Relax each muscle group individually    Begin with your feet, flex, then relax. Try to imagine your feet feeling heavy and sinking into the bed. Move to your calves, do the same thing. Work through each muscle group toward your head.    Relaxing Mental Imagery: Try to imagine a trip that you took and found relaxing, or imagine a day at the beach. Try to walk yourself through the day in your mind as if you were dreaming it. Try to imagine sensing the different experiences, such as feeling sand between your toes, the heat of the sun on your skin, seeing the waves crashing the shore, the smell of the salt water, etc.     Deal with your worries before bedtime    Set aside a worry time around dinner time for 10-15 minutes. Write down the    things that are on your mind. Plan time in the coming days to address those    issues. Brainstorm ideas on how you will deal with them. Try to identify issues    that are out of your control, and try to let those issues go.  Listen to relaxation tapes   Classical Music or Nature sounds   Back Massage   Get regular exercise each day (at least 1-2 hours before bedtime)   Take medications only as directed   Eat a light bedtime snack or warm drink   Warm milk   Warm herbal tea (non-caffeinated)       Things to avoid   No overstimulating activities just before bed   No competitive games before bedtime   No exciting television programs before bedtime   Avoid caffeine after lunchtime    Avoid chocolate   Do not use alcohol to induce sleep (worsens Insomnia)   Do not take someone else's sleeping pills   Do not look at the clock when awakening   Do not turn on light when getting up to use bathroom, use a nightlight     Online Programs   www.SHUTi.me (pronounced shut eye). There is a fee for this program. Enter the code  [place]  if you decide to enroll in this program.    www.sleepIO.com (pronounced sleep ee oh). There is a fee for this program. Enter the code  [place]  if you decide to enroll in this program.     Suggested Resources  Insomnia Treatment Books   Overcoming Insomnia by Jos Villalobos and Melva Pham (2008)  No More Sleepless Nights by Luis A Nazario and Jayashree Hamilton (1996)  Say Maciej to Insomnia by Charles Eagle (2009)  The Insomnia Workbook by Zenia Hopson and Kennedy Dickson (2009)  The Insomnia Answer by Moises Balderas and Jc Godinez (2006)      Stress Management and Relaxation Books  The Relaxation and Stress Reduction Workbook by Paula Casillas, Megan Lowe and Kyle Garner (2008)  Stress Management Workbook: Techniques and Self-Assessment Procedures by Bhavna Garcia and Anurag Castano (1997)  A Mindfulness-Based Stress Reduction Workbook by Brent Batista and Marychuy Bonner (2010)  The Complete Stress Management Workbook by Pritesh Pizarro, Matthieu Marrero and Nick Biggs (1996)  Assert Yourself by Lavonne Webb and Scout Webb (1977)    Relaxation Resources for Computer Download   These websites offer resources to help you relax. This list is for information only. [place] is not responsible for the quality of services or the actions of any person or organization.  Progressive Muscle Relaxation (PMR):   http://www.innerWidgetboxstudio.com/progressive-muscle-relaxation-exercise.html   http://studentsupport.[facility]/counseling/resources/self-help/relaxation-and-stress-management/   Deep Breathing  Exercises:  http://www.Invesdor/breathing-awareness.html     Meditation:   www.[employer]is  www.theHack Upstateguided-meditation-site.com You may have to pay for some of these resources.    Guided Imagery:  http://www.Invesdor/guided-imagery-scripts.html   http://Code42/library/zuwpwhtyrd-shnecv-eumgwpq/   Consider phone apps such as: Calm, Headspace or Insight Timer.    Counseling / Behavioral Health  [facility]  Visit www.[place].org or call [phone] to find a clinic close to you.  Or call [phone] for [facility].

## 2025-05-19 NOTE — NURSING NOTE
Current patient location: [address] [place] [id number]    Is the patient currently in the state of [place]? YES    Visit mode: VIDEO    If the visit is dropped, the patient can be reconnected by:VIDEO VISIT: Text to cell phone:   Telephone Information:   Mobile [phone]       Will anyone else be joining the visit? NO  (If patient encounters technical issues they should call [phone]    Are changes needed to the allergy or medication list? Pt stated no changes to allergies and Pt stated no med changes    Are refills needed on medications prescribed by this physician? NO    Rooming Documentation:  Questionnaire(s) completed    Reason for visit: RECHRENETTA Devi VVF

## 2025-05-27 ENCOUNTER — LAB (OUTPATIENT)
Dept: LAB | Facility: CLINIC | Age: 60
End: 2025-05-27
Payer: COMMERCIAL

## 2025-05-27 DIAGNOSIS — Z79.899 HIGH RISK MEDICATION USE: ICD-10-CM

## 2025-05-27 DIAGNOSIS — M05.79 SEROPOSITIVE RHEUMATOID ARTHRITIS OF MULTIPLE SITES (H): ICD-10-CM

## 2025-05-27 DIAGNOSIS — R76.8 POSITIVE ANTI-CCP TEST: ICD-10-CM

## 2025-05-27 DIAGNOSIS — N18.31 STAGE 3A CHRONIC KIDNEY DISEASE (H): ICD-10-CM

## 2025-05-27 DIAGNOSIS — M15.0 PRIMARY OSTEOARTHRITIS INVOLVING MULTIPLE JOINTS: ICD-10-CM

## 2025-05-27 LAB
ALBUMIN SERPL BCG-MCNC: 4.3 G/DL (ref 3.5–5.2)
ALT SERPL W P-5'-P-CCNC: 23 U/L (ref 0–50)
CREAT SERPL-MCNC: 0.93 MG/DL (ref 0.51–0.95)
EGFRCR SERPLBLD CKD-EPI 2021: 70 ML/MIN/1.73M2
ERYTHROCYTE [DISTWIDTH] IN BLOOD BY AUTOMATED COUNT: 13.4 % (ref 10–15)
HCT VFR BLD AUTO: 40.3 % (ref 35–47)
HGB BLD-MCNC: 13.2 G/DL (ref 11.7–15.7)
MCH RBC QN AUTO: 31.1 PG (ref 26.5–33)
MCHC RBC AUTO-ENTMCNC: 32.8 G/DL (ref 31.5–36.5)
MCV RBC AUTO: 95 FL (ref 78–100)
PLATELET # BLD AUTO: 258 10E3/UL (ref 150–450)
RBC # BLD AUTO: 4.24 10E6/UL (ref 3.8–5.2)
WBC # BLD AUTO: 5.8 10E3/UL (ref 4–11)

## 2025-05-27 PROCEDURE — 85027 COMPLETE CBC AUTOMATED: CPT

## 2025-05-27 PROCEDURE — 36415 COLL VENOUS BLD VENIPUNCTURE: CPT

## 2025-05-27 PROCEDURE — 84460 ALANINE AMINO (ALT) (SGPT): CPT

## 2025-05-27 PROCEDURE — 82565 ASSAY OF CREATININE: CPT

## 2025-05-27 PROCEDURE — 82040 ASSAY OF SERUM ALBUMIN: CPT

## 2025-05-28 ENCOUNTER — RESULTS FOLLOW-UP (OUTPATIENT)
Dept: RHEUMATOLOGY | Facility: CLINIC | Age: 60
End: 2025-05-28

## 2025-05-29 ENCOUNTER — OFFICE VISIT (OUTPATIENT)
Dept: RHEUMATOLOGY | Facility: CLINIC | Age: 60
End: 2025-05-29
Payer: COMMERCIAL

## 2025-05-29 VITALS
SYSTOLIC BLOOD PRESSURE: 120 MMHG | DIASTOLIC BLOOD PRESSURE: 70 MMHG | RESPIRATION RATE: 20 BRPM | HEART RATE: 82 BPM | WEIGHT: 227.9 LBS | BODY MASS INDEX: 35.69 KG/M2 | OXYGEN SATURATION: 95 %

## 2025-05-29 DIAGNOSIS — R76.8 POSITIVE ANTI-CCP TEST: ICD-10-CM

## 2025-05-29 DIAGNOSIS — M05.79 SEROPOSITIVE RHEUMATOID ARTHRITIS OF MULTIPLE SITES (H): Primary | ICD-10-CM

## 2025-05-29 DIAGNOSIS — M65.342 TRIGGER RING FINGER OF LEFT HAND: ICD-10-CM

## 2025-05-29 DIAGNOSIS — M15.0 PRIMARY OSTEOARTHRITIS INVOLVING MULTIPLE JOINTS: ICD-10-CM

## 2025-05-29 DIAGNOSIS — Z79.899 HIGH RISK MEDICATION USE: ICD-10-CM

## 2025-05-29 RX ORDER — FOLIC ACID 1 MG/1
1000 TABLET ORAL DAILY
Qty: 90 TABLET | Refills: 0 | Status: SHIPPED | OUTPATIENT
Start: 2025-05-29

## 2025-05-29 RX ORDER — HYDROXYCHLOROQUINE SULFATE 200 MG/1
200 TABLET, FILM COATED ORAL 2 TIMES DAILY WITH MEALS
Qty: 180 TABLET | Refills: 1 | Status: SHIPPED | OUTPATIENT
Start: 2025-05-29

## 2025-05-29 ASSESSMENT — PAIN SCALES - GENERAL: PAINLEVEL_OUTOF10: SEVERE PAIN (7)

## 2025-05-29 NOTE — PROGRESS NOTES
Rheumatology follow-up visit note     Isabela is a 59 year old female presents today for follow-up.    Isabela was seen today for recheck.    Diagnoses and all orders for this visit:    Seropositive rheumatoid arthritis of multiple sites (H)  -     hydroxychloroquine (PLAQUENIL) 200 MG tablet; Take 1 tablet (200 mg) by mouth 2 times daily (with meals).  -     folic acid (FOLVITE) 1 MG tablet; Take 1 tablet (1,000 mcg) by mouth daily.    Trigger ring finger of left hand    High risk medication use  -     folic acid (FOLVITE) 1 MG tablet; Take 1 tablet (1,000 mcg) by mouth daily.    Primary osteoarthritis involving multiple joints    Positive anti-CCP test        She is to continue methotrexate but split the dose on the day of half in the morning half at night, folic acid as now hydroxychloroquine and sulfasalazine as currently.  The longitudinal plan of care for the diagnosis(es)/condition(s) as documented were addressed during this visit. Due to the added complexity in care, I will continue to support Isabela in the subsequent management and with ongoing continuity of care.      Follow up in 6 months.  Labs every 3 months.  -  HPI    Isabela STRATTON Voit is a 59 year old female is here for follow-up of seropositive rheumatoid arthritis.  She is on triple regimen.  She has osteoarthritis.  She has noted worsening pain in her right hand pointing to the right ring finger where she has locking clicking this is going on since fall of the last year however she has been so much busy with taking care of her father's estate and now finally somewhat central and she wants to address this meanwhile this has been getting worse.  Recent labs reviewed entirely normal.   Besides volunteering she has joined gym, water aerobics and is very happy for that.  She has noted triggering of the right ring finger which is mild.  She is aware of the options.    Reminded of her eye examination.     DETAILED EXAMINATION  05/29/25  :    Vitals:    05/29/25  1253   BP: 120/70   BP Location: Right arm   Patient Position: Sitting   Cuff Size: Adult Large   Pulse: 82   Resp: 20   SpO2: 95%   Weight: 103.4 kg (227 lb 14.4 oz)     Alert oriented. Head including the face is examined for malar rash, heliotropes, scarring, lupus pernio. Eyes examined for redness such as in episcleritis/scleritis, periorbital lesions.   Neck/ Face examined for parotid gland swelling, range of motion of neck.  Left upper and lower and right upper and lower extremities examined for tenderness, swelling, warmth of the appendicular joints, range of motion, edema, rash.  Some of the important findings included: she does not have evidence of synovitis in the palpable joints of the upper extremities.  No significant deformities of the digits.  She has triggering of the right ring finger with A1 nodularity squaring of the first CMC's, intact gait, range of motion shoulders full.  Patient Active Problem List    Diagnosis Date Noted    Polyp of colon 01/24/2024     Priority: Medium    YUVAL (obstructive sleep apnea) 02/04/2022     Priority: Medium     Severe YUVAL      Morbid obesity (H) 10/05/2021     Priority: Medium    Stage 3a chronic kidney disease (H) 02/08/2021     Priority: Medium    Primary osteoarthritis involving multiple joints 11/01/2018     Priority: Medium    Dyslipidemia      Priority: Medium     Created by Conversion        Hypothyroidism      Priority: Medium     Created by Conversion  Replacement Utility updated for latest IMO load        Hypertension      Priority: Medium     Created by Conversion  Replacement Utility updated for latest IMO load        Vitamin D Deficiency      Priority: Medium     Created by Conversion  Replacement Utility updated for latest IMO load        Seropositive rheumatoid arthritis of multiple sites (H) 04/07/2016     Priority: Medium    High risk medication use 04/07/2016     Priority: Medium     Past Surgical History:   Procedure Laterality Date    BIOPSY  2007     Thyroidectomy    COLONOSCOPY  2014    EYE SURGERY  2020    Eyelids    GYN SURGERY  2005    Fibroids    [place] THYROID LOBECTOMY,UNILAT      Description: Thyroid Surgery Thyroid Lobectomy Right Lobe;  Proc Date: 02/05/2007;      Past Medical History:   Diagnosis Date    Arthritis     Hypertension     Thyroid disease     Uncomplicated asthma      Allergies   Allergen Reactions    Clopidogrel Hives    Dilantin Infatabs [Phenytoin] Unknown    Phenytoin Sodium Extended [Phenytoin] Hives     Current Outpatient Medications   Medication Sig Dispense Refill    Bioflavonoid Products (VITAMIN C) CHEW       cholecalciferol, vitamin D3, (CHOLECALCIFEROL) 1,000 unit tablet [CHOLECALCIFEROL, VITAMIN D3, (CHOLECALCIFEROL) 1,000 UNIT TABLET] Take 1,000 Units by mouth daily.      folic acid (FOLVITE) 1 MG tablet TAKE ONE TABLET BY MOUTH ONE TIME DAILY 90 tablet 0    hydrochlorothiazide (HYDRODIURIL) 25 MG tablet Take 1 tablet (25 mg) by mouth daily. 90 tablet 2    hydroxychloroquine (PLAQUENIL) 200 MG tablet Take 1 tablet (200 mg) by mouth 2 times daily (with meals). 180 tablet 1    levothyroxine (SYNTHROID/LEVOTHROID) 125 MCG tablet TAKE ONE TABLET BY MOUTH ONE TIME DAILY 90 tablet 2    levothyroxine (SYNTHROID/LEVOTHROID) 137 MCG tablet Alternate with 125 mcg daily 90 tablet 3    lisinopril (ZESTRIL) 20 MG tablet Take 1 tablet (20 mg) by mouth daily. 90 tablet 1    methotrexate 2.5 MG tablet TAKE 8 TABLETS BY MOUTH ONCE PER WEEK. 96 tablet 0    sulfaSALAzine (AZULFIDINE) 500 MG tablet TAKE TWO TABLETS BY MOUTH TWICE DAILY 240 tablet 0     family history includes Asthma in her mother; Breast Cancer (age of onset: 62.00) in her mother; Diabetes in her father; Heart Disease in her maternal grandfather; Hyperlipidemia in her father and mother; Hypertension in her father and mother; Osteoporosis in her mother; Sleep Apnea in her father.  Social Connections: Unknown (10/24/2024)    Social Connection and Isolation Panel [NHANES]      Frequency of Communication with Friends and Family: Not on file     Frequency of Social Gatherings with Friends and Family: Three times a week     Attends Jehovah's witness Services: Not on file     Active Member of Clubs or Organizations: Not on file     Attends Club or Organization Meetings: Not on file     Marital Status: Not on file          WBC Count   Date Value Ref Range Status   05/27/2025 5.8 4.0 - 11.0 10e3/uL Final     RBC Count   Date Value Ref Range Status   05/27/2025 4.24 3.80 - 5.20 10e6/uL Final     Hemoglobin   Date Value Ref Range Status   05/27/2025 13.2 11.7 - 15.7 g/dL Final     Hematocrit   Date Value Ref Range Status   05/27/2025 40.3 35.0 - 47.0 % Final     MCV   Date Value Ref Range Status   05/27/2025 95 78 - 100 fL Final     MCH   Date Value Ref Range Status   05/27/2025 31.1 26.5 - 33.0 pg Final     Platelet Count   Date Value Ref Range Status   05/27/2025 258 150 - 450 10e3/uL Final     AST   Date Value Ref Range Status   11/06/2024 31 0 - 45 U/L Final     ALT   Date Value Ref Range Status   05/27/2025 23 0 - 50 U/L Final     Albumin   Date Value Ref Range Status   05/27/2025 4.3 3.5 - 5.2 g/dL Final   05/20/2022 4.0 3.5 - 5.0 g/dL Final     Alkaline Phosphatase   Date Value Ref Range Status   11/06/2024 71 40 - 150 U/L Final     Creatinine   Date Value Ref Range Status   05/27/2025 0.93 0.51 - 0.95 mg/dL Final     GFR Estimate   Date Value Ref Range Status   05/27/2025 70 >60 mL/min/1.73m2 Final     Comment:     eGFR calculated using 2021 CKD-EPI equation.   05/04/2021 58 (L) >60 mL/min/1.73m2 Final     GFR Estimate If Black   Date Value Ref Range Status   05/04/2021 >60 >60 mL/min/1.73m2 Final

## 2025-06-09 ENCOUNTER — OFFICE VISIT (OUTPATIENT)
Dept: RHEUMATOLOGY | Facility: CLINIC | Age: 60
End: 2025-06-09
Payer: COMMERCIAL

## 2025-06-09 VITALS
HEART RATE: 89 BPM | SYSTOLIC BLOOD PRESSURE: 139 MMHG | WEIGHT: 228.5 LBS | BODY MASS INDEX: 35.79 KG/M2 | OXYGEN SATURATION: 94 % | DIASTOLIC BLOOD PRESSURE: 67 MMHG

## 2025-06-09 DIAGNOSIS — M05.79 SEROPOSITIVE RHEUMATOID ARTHRITIS OF MULTIPLE SITES (H): ICD-10-CM

## 2025-06-09 DIAGNOSIS — M65.341 TRIGGER RING FINGER OF RIGHT HAND: Primary | ICD-10-CM

## 2025-06-09 RX ORDER — TRIAMCINOLONE ACETONIDE 40 MG/ML
20 INJECTION, SUSPENSION INTRA-ARTICULAR; INTRAMUSCULAR ONCE
Status: COMPLETED | OUTPATIENT
Start: 2025-06-09 | End: 2025-06-09

## 2025-06-09 RX ADMIN — TRIAMCINOLONE ACETONIDE 20 MG: 40 INJECTION, SUSPENSION INTRA-ARTICULAR; INTRAMUSCULAR at 14:35

## 2025-06-09 NOTE — PROGRESS NOTES
Rheumatology follow-up visit note     Isabela is a 59 year old female presents today for follow-up.    Isabela was seen today for recheck.    Diagnoses and all orders for this visit:    Trigger ring finger of right hand  -     INJECTION SINGLE TENDON SHEATH/LIGAMENT  -     triamcinolone (KENALOG-40) injection 20 mg    Seropositive rheumatoid arthritis of multiple sites (H)               After pros and cons were discussed including risk of infection, bleeding, skin changes including thinning, pigmentary alteration and scarring to name a few, right ring flexor tendon sheath at A1 level injected as noted in the orders section. This was done with nontouch technique.  The patient tolerated the procedure well and had a brisk Marcaine effect.  The postinjection care was discussed.      HPI    Isabela Olivo is a 59 year old female is here for worsening pain.  This is in her right hand.  This is epicenter at the right ring finger which has been triggering.  This is interfering with day-to-day activity.  She has background of rheumatoid arthritis with, seropositive for which she is on hydroxychloroquine methotrexate and sulfasalazine.  There has been no history of trauma to the right hand.  Besides volunteering she has joined gym, water aerobics and is very happy for that.  On her previous visit she had noted the ring finger triggering with that was mild.  Was gotten worse since then.      DETAILED EXAMINATION  06/09/25  :    Vitals:    06/09/25 1401   BP: 139/67   BP Location: Right arm   Patient Position: Sitting   Cuff Size: Adult Large   Pulse: 89   SpO2: 94%   Weight: 103.6 kg (228 lb 8 oz)     Alert oriented. Head including the face is examined for malar rash, heliotropes, scarring, lupus pernio. Eyes examined for redness such as in episcleritis/scleritis, periorbital lesions.   Neck/ Face examined for parotid gland swelling, range of motion of neck.  Left upper and lower and right upper and lower extremities examined for  tenderness, swelling, warmth of the appendicular joints, range of motion, edema, rash.  Some of the important findings included: she does not have evidence of synovitis in the palpable joints of the upper extremities.  She has triggering of the right ring finger A1 nodularity along the flexor tendon sheath.  This is nontender.  Patient Active Problem List    Diagnosis Date Noted    Polyp of colon 01/24/2024     Priority: Medium    YUVAL (obstructive sleep apnea) 02/04/2022     Priority: Medium     Severe YUVAL      Morbid obesity (H) 10/05/2021     Priority: Medium    Stage 3a chronic kidney disease (H) 02/08/2021     Priority: Medium    Primary osteoarthritis involving multiple joints 11/01/2018     Priority: Medium    Dyslipidemia      Priority: Medium     Created by Conversion        Hypothyroidism      Priority: Medium     Created by Conversion  Replacement Utility updated for latest IMO load        Hypertension      Priority: Medium     Created by Conversion  Replacement Utility updated for latest IMO load        Vitamin D Deficiency      Priority: Medium     Created by Conversion  Replacement Utility updated for latest IMO load        Seropositive rheumatoid arthritis of multiple sites (H) 04/07/2016     Priority: Medium    High risk medication use 04/07/2016     Priority: Medium     Past Surgical History:   Procedure Laterality Date    BIOPSY  2007    Thyroidectomy    COLONOSCOPY  2014    EYE SURGERY  2020    Eyelids    GYN SURGERY  2005    Fibroids    [place] THYROID LOBECTOMY,UNILAT      Description: Thyroid Surgery Thyroid Lobectomy Right Lobe;  Proc Date: 02/05/2007;      Past Medical History:   Diagnosis Date    Arthritis     Hypertension     Thyroid disease     Uncomplicated asthma      Allergies   Allergen Reactions    Clopidogrel Hives    Dilantin Infatabs [Phenytoin] Unknown    Phenytoin Sodium Extended [Phenytoin] Hives     Current Outpatient Medications   Medication Sig Dispense Refill    Bioflavonoid  Products (VITAMIN C) CHEW       cholecalciferol, vitamin D3, (CHOLECALCIFEROL) 1,000 unit tablet [CHOLECALCIFEROL, VITAMIN D3, (CHOLECALCIFEROL) 1,000 UNIT TABLET] Take 1,000 Units by mouth daily.      folic acid (FOLVITE) 1 MG tablet Take 1 tablet (1,000 mcg) by mouth daily. 90 tablet 0    hydrochlorothiazide (HYDRODIURIL) 25 MG tablet Take 1 tablet (25 mg) by mouth daily. 90 tablet 2    hydroxychloroquine (PLAQUENIL) 200 MG tablet Take 1 tablet (200 mg) by mouth 2 times daily (with meals). 180 tablet 1    levothyroxine (SYNTHROID/LEVOTHROID) 125 MCG tablet TAKE ONE TABLET BY MOUTH ONE TIME DAILY 90 tablet 2    levothyroxine (SYNTHROID/LEVOTHROID) 137 MCG tablet Alternate with 125 mcg daily 90 tablet 3    lisinopril (ZESTRIL) 20 MG tablet Take 1 tablet (20 mg) by mouth daily. 90 tablet 1    methotrexate 2.5 MG tablet TAKE 8 TABLETS BY MOUTH ONCE PER WEEK. 96 tablet 0    sulfaSALAzine (AZULFIDINE) 500 MG tablet TAKE TWO TABLETS BY MOUTH TWICE DAILY 240 tablet 0     family history includes Asthma in her mother; Breast Cancer (age of onset: 62.00) in her mother; Diabetes in her father; Heart Disease in her maternal grandfather; Hyperlipidemia in her father and mother; Hypertension in her father and mother; Osteoporosis in her mother; Sleep Apnea in her father.  Social Connections: Unknown (10/24/2024)    Social Connection and Isolation Panel [NHANES]     Frequency of Communication with Friends and Family: Not on file     Frequency of Social Gatherings with Friends and Family: Three times a week     Attends [employer] Services: Not on file     Active Member of Clubs or Organizations: Not on file     Attends Club or Organization Meetings: Not on file     Marital Status: Not on file          WBC Count   Date Value Ref Range Status   05/27/2025 5.8 4.0 - 11.0 10e3/uL Final     RBC Count   Date Value Ref Range Status   05/27/2025 4.24 3.80 - 5.20 10e6/uL Final     Hemoglobin   Date Value Ref Range Status   05/27/2025 13.2  11.7 - 15.7 g/dL Final     Hematocrit   Date Value Ref Range Status   05/27/2025 40.3 35.0 - 47.0 % Final     MCV   Date Value Ref Range Status   05/27/2025 95 78 - 100 fL Final     MCH   Date Value Ref Range Status   05/27/2025 31.1 26.5 - 33.0 pg Final     Platelet Count   Date Value Ref Range Status   05/27/2025 258 150 - 450 10e3/uL Final     AST   Date Value Ref Range Status   11/06/2024 31 0 - 45 U/L Final     ALT   Date Value Ref Range Status   05/27/2025 23 0 - 50 U/L Final     Albumin   Date Value Ref Range Status   05/27/2025 4.3 3.5 - 5.2 g/dL Final   05/20/2022 4.0 3.5 - 5.0 g/dL Final     Alkaline Phosphatase   Date Value Ref Range Status   11/06/2024 71 40 - 150 U/L Final     Creatinine   Date Value Ref Range Status   05/27/2025 0.93 0.51 - 0.95 mg/dL Final     GFR Estimate   Date Value Ref Range Status   05/27/2025 70 >60 mL/min/1.73m2 Final     Comment:     eGFR calculated using 2021 CKD-EPI equation.   05/04/2021 58 (L) >60 mL/min/1.73m2 Final     GFR Estimate If Black   Date Value Ref Range Status   05/04/2021 >60 >60 mL/min/1.73m2 Final

## 2025-07-02 ENCOUNTER — TRANSFERRED RECORDS (OUTPATIENT)
Dept: HEALTH INFORMATION MANAGEMENT | Facility: CLINIC | Age: 60
End: 2025-07-02
Payer: COMMERCIAL

## 2025-08-25 ENCOUNTER — LAB (OUTPATIENT)
Dept: LAB | Facility: CLINIC | Age: 60
End: 2025-08-25
Payer: COMMERCIAL

## 2025-08-25 DIAGNOSIS — N18.31 STAGE 3A CHRONIC KIDNEY DISEASE (H): ICD-10-CM

## 2025-08-25 DIAGNOSIS — Z79.899 HIGH RISK MEDICATION USE: ICD-10-CM

## 2025-08-25 DIAGNOSIS — M05.79 SEROPOSITIVE RHEUMATOID ARTHRITIS OF MULTIPLE SITES (H): ICD-10-CM

## 2025-08-25 DIAGNOSIS — M15.0 PRIMARY OSTEOARTHRITIS INVOLVING MULTIPLE JOINTS: ICD-10-CM

## 2025-08-25 DIAGNOSIS — R76.8 POSITIVE ANTI-CCP TEST: ICD-10-CM

## 2025-08-25 LAB
ALBUMIN SERPL BCG-MCNC: 4.4 G/DL (ref 3.5–5.2)
ALT SERPL W P-5'-P-CCNC: 26 U/L (ref 0–50)
CREAT SERPL-MCNC: 1.04 MG/DL (ref 0.51–0.95)
EGFRCR SERPLBLD CKD-EPI 2021: 60 ML/MIN/1.73M2
ERYTHROCYTE [DISTWIDTH] IN BLOOD BY AUTOMATED COUNT: 13.2 % (ref 10–15)
HCT VFR BLD AUTO: 40.8 % (ref 35–47)
HGB BLD-MCNC: 13.4 G/DL (ref 11.7–15.7)
MCH RBC QN AUTO: 31.3 PG (ref 26.5–33)
MCHC RBC AUTO-ENTMCNC: 32.8 G/DL (ref 31.5–36.5)
MCV RBC AUTO: 95.3 FL (ref 78–100)
PLATELET # BLD AUTO: 283 10E3/UL (ref 150–450)
RBC # BLD AUTO: 4.28 10E6/UL (ref 3.8–5.2)
WBC # BLD AUTO: 5.28 10E3/UL (ref 4–11)

## 2025-08-25 PROCEDURE — 82565 ASSAY OF CREATININE: CPT

## 2025-08-25 PROCEDURE — 84460 ALANINE AMINO (ALT) (SGPT): CPT

## 2025-08-25 PROCEDURE — 36415 COLL VENOUS BLD VENIPUNCTURE: CPT

## 2025-08-25 PROCEDURE — 82040 ASSAY OF SERUM ALBUMIN: CPT

## 2025-08-25 PROCEDURE — 85027 COMPLETE CBC AUTOMATED: CPT

## 2025-08-26 DIAGNOSIS — M05.79 SEROPOSITIVE RHEUMATOID ARTHRITIS OF MULTIPLE SITES (H): ICD-10-CM

## 2025-08-27 RX ORDER — SULFASALAZINE 500 MG/1
1000 TABLET ORAL
Qty: 240 TABLET | Refills: 0 | Status: SHIPPED | OUTPATIENT
Start: 2025-08-27

## 2025-08-27 RX ORDER — METHOTREXATE 2.5 MG/1
TABLET ORAL
Qty: 96 TABLET | Refills: 0 | Status: SHIPPED | OUTPATIENT
Start: 2025-08-27